# Patient Record
Sex: MALE | Employment: FULL TIME | URBAN - METROPOLITAN AREA
[De-identification: names, ages, dates, MRNs, and addresses within clinical notes are randomized per-mention and may not be internally consistent; named-entity substitution may affect disease eponyms.]

---

## 2023-10-04 ENCOUNTER — PRE VISIT (OUTPATIENT)
Dept: ONCOLOGY | Facility: CLINIC | Age: 28
End: 2023-10-04
Payer: COMMERCIAL

## 2023-10-04 ENCOUNTER — TRANSCRIBE ORDERS (OUTPATIENT)
Dept: ONCOLOGY | Facility: CLINIC | Age: 28
End: 2023-10-04
Payer: COMMERCIAL

## 2023-10-04 ENCOUNTER — PATIENT OUTREACH (OUTPATIENT)
Dept: ONCOLOGY | Facility: CLINIC | Age: 28
End: 2023-10-04
Payer: COMMERCIAL

## 2023-10-04 DIAGNOSIS — R91.8 PULMONARY NODULES: Primary | ICD-10-CM

## 2023-10-04 NOTE — TELEPHONE ENCOUNTER
Action    Action Taken 10/4/2023 2:35pm KEB   Warm Transfer From Scheduling     I spoke to pt Delroy and his mom, Deb.     He was seen at [facility]     Deb would like to have the nurses call her Cell at Ph: [phone]- I sent a message to [facility] asking them to make this phone number the primary contact # in his chart.     I called [facility]'s IMG Dept Ph: [phone]- I called the [place] [facility] - I was transferred again Ext [phone]- I left a detailed  requesting scans.     3:53pm KE   I called [facility]'s IMG Dept in [place] again - their phone went to .      RECORDS STATUS - ALL OTHER DIAGNOSIS      RECORDS RECEIVED FROM: [facility]    Appt Date: Warm Transfer From Scheduling    NOTES STATUS DETAILS   OFFICE NOTE from referring provider     OFFICE NOTE from medical oncologist     DISCHARGE SUMMARY from hospital     DISCHARGE REPORT from the ER     OPERATIVE REPORT     CLINICAL TRIAL TREATMENTS TO DATE     LABS     PATHOLOGY REPORTS     ANYTHING RELATED TO DIAGNOSIS Complete  CE- Labs last updated on 9/29/2023   GENONOMIC TESTING     TYPE:     IMAGING (NEED IMAGES & REPORT)     [facility]- [facility] Ph: [phone]   CT Chest 10/2/2023     CT abdomen Pelvis 9/29/2023    CT abdomen Pelvis 9/29/2023   MRI     MAMMO     ULTRASOUND Requested- [facility]  US Scrotum 9/29/2023    PET

## 2023-10-04 NOTE — PROGRESS NOTES
I received a new referral for patient to be seen in the IP (Interventional Pulmonology) clinic, but after review of his chart I am checking with our malignant heme nurse navigators to see if they feel he would benefit from one of their physicians and/or both (MH & IP).    I called and left a message for his mother to call me regarding the referral to hopefully get a bit more background.  I asked that she call me back and I gave her my direct contact information.        New IP (Interventional Pulmonology) referral rec'd.  Chart reviewed.      New Patient: Interventional Pulmonary (Lung nodule) Nurse Navigator Note    Referring provider: self    Referred to (specialty): Interventional Pulmonary (Lung nodule)    Requested provider (if applicable): n/a    Date Referral Received: 10/4/2023    Evaluation for: Pt called/4 lung nodules  Pt and Mother called - 2nd opinion  Unsure of Dx but states that Pt had 4 nodules. Two largest size is 8mm and 10mm. Lymph node in Pelvic is inflammed. Pt is struggling with pain.   All records are in [facility]. Pt was in urgent care on 9/23 and followed up with a NP in primary care.   Ct scan of chest completed on 10/2  CT of Abdomen completed 9/29  Ultrasound to check for testicular cancer  Biopsy for Abdomen and Pelvis is scheduled for 10/11  Biopsy of Lungs is scheduled in November.    Clinical History (per Nurse review of records provided):    **BOOK MARKED**    [facility]--Requested to be pushed to PACS  10/2/2023:  CT Chest W/Contrast    LOCATION: [facility]   DATE: 10/2/2023     INDICATION: Lung nodule, > 8mm. Lung mass. New lymphadenopathy   involving the abdomen/pelvis. Evaluate for lymphadenopathy or   metastatic disease.   COMPARISON: 09/29/2023, 10/15/2018.    TECHNIQUE: CT chest with IV contrast. Multiplanar reformats were   obtained. Dose reduction techniques were used.     CONTRAST: 75 mL Omnipaque 350.     FINDINGS:   LUNGS AND PLEURA:  Multiple indeterminate well-circumscribed solid   pulmonary nodules in the right lung. 3 mm nodule anterior aspect   right upper lobe. 8 mm well-circumscribed solid noncalcified   pulmonary nodule right middle lobe immediately adjacent to the hilar   region. 6 mm nodule posterior lateral right lower lobe. 10 mm mean   diameter pulmonary nodule right lower lobe. No infiltrates or   effusions.     MEDIASTINUM/AXILLAE: No lymphadenopathy. No thoracic aneurysm.     CORONARY ARTERY CALCIFICATION: None.     UPPER ABDOMEN: Unchanged upper abdominal lymphadenopathy as detailed   on this and CT abdomen pelvis 09/29/2023.     MUSCULOSKELETAL: Unremarkable.     IMPRESSION:   1.  Multiple indeterminate well-circumscribed solid pulmonary nodules   in the right lung as detailed above. The largest measures 10 mm at   the right lung base and another measures 8 mm in the medial right   middle lobe. These are indeterminate and may represent metastatic   disease given the lymphadenopathy in the abdomen and pelvis.     2.  No evidence for lymphadenopathy involving the mediastinum or   either axillary region.        9/29/2023:  CT ABDOMEN PELVIS w CONTRAST   LOCATION: [facility]     INDICATION: Left lower quadrant abdominal pain. Suprapubic and left   lower quadrant abdominal pain.  Previous CT scan in 2018 with   testicle in inguinal canal. Evaluate for diverticulitis and   prostatitis.     COMPARISON: 10/15/2018 CT.     TECHNIQUE: CT scan of the abdomen and pelvis was performed following   injection of IV contrast. Multiplanar reformats were obtained. Dose   reduction techniques were used.     CONTRAST: 100 mL Omnipaque 350 IV.     FINDINGS:   LOWER CHEST: New 10 mm noncalcified right lower lobe nodule. No   effusion.     HEPATOBILIARY: Normal.     PANCREAS: Normal.     SPLEEN: Normal.     ADRENAL GLANDS: Normal.     KIDNEYS/BLADDER: Normal.     BOWEL: No obstruction or inflammatory change. Normal appendix.     LYMPH  NODES: Diffuse mild/moderate abdominal/pelvic retroperitoneal   lymphadenopathy. Examples of largest nodes include 3.0 x 1.8 cm   retrocrural, 3.0 x 1.8 cm left periaortic, 2.5 x 2.3 cm left   periaortic, 4.2 x 2.2 cm left external iliac, 2.0 x 1.4 cm right   internal iliac, 2.7 x 2.2 cm left internal iliac, and 2.8 x 1.7 cm   left inguinal. No right inguinal lymphadenopathy. Multiple small,   mildly enlarged mesenteric root lymph nodes. Mild haziness mesenteric   root and retroperitoneal fat.     VASCULATURE: Unremarkable.     PELVIC ORGANS: Right testicle now appears appropriately located in   the scrotum, testicles and scrotum incompletely imaged on this exam.   Left testicle is not included on imaging field of view.     MUSCULOSKELETAL: Unremarkable. No suspicious bone lesion is   visualized.     IMPRESSION:   1. Diffuse mild/moderately enlarged lymph nodes throughout   abdominal/pelvic retroperitoneum, mesentery, and left groin.   2.  New indeterminate 1 cm right lung base nodule.   3.  Findings suggest malignancy, consider lymphoma. Testicles/scrotum   not included on imaging field of view, correlate with physical exam   or ultrasound to exclude testicular mass/neoplasm. Consider chest CT   correlation to complete staging. Lymph nodes would be amenable to   CT-guided biopsy.   4.  No bowel inflammation or other acute findings. Solid abdominal   organs unremarkable.   Records Location (Care Everywhere, Media, etc.): [facility]    RECORDS NEEDED:  Last FIVE years **ALL** imaging pushed to PACS from [place] [employer]----thank you!!    Additional testing needed prior to consult: TBD

## 2023-10-05 NOTE — PROGRESS NOTES
I called Deb, pt's mom back and went over what my co-worker suggested.  So he will get his biopsy with [facility] on WED 10/11, then see one of our community doctors a week later to review results and go over next steps. She thought this was a good plan.  He will be scheduled at the  location as this is closest to their home.

## 2023-10-05 NOTE — PROGRESS NOTES
I received a call back from mom, Deb this morning.  I asked for more clarification on what they would like regarding his referral.  I explained he has two different things going on that may or may not be related.  I am more concerned at this point regarding his lymphadenopathy and figuring out what that is.  I can always have him seen with the lung nodule clinic, but seeing someone in oncology fist might be beneficial.  She did confirm that pt has a biopsy scheduled next WED 10/11 (earliest they could get him in).  I told Deb I would like to check in with our malignant heme navigator to get her input.  I told Deb after some more review I will get back to her with a plan.

## 2023-10-10 NOTE — TELEPHONE ENCOUNTER
Imaging Received  October 10, 2023    4:21 PM  FILOMENA   Action: Images from [facility] received and resolved to PACS.

## 2023-10-17 NOTE — PROGRESS NOTES
[facility]    Hematology/Oncology New Patient Note      Today's Date: 10/18/23    Reason for Consult: Embryonal carcinoma.    HISTORY OF PRESENT ILLNESS: Jayson Banegas is a 28 year old male with history of alcoholism and nicotine dependence in remission, depression, anxiety, and PTSD who presents with the following oncologic history:  1. 9/2023: Presented to PCP with left lower quadrant abdominal pain.  Given 2 weeks of antibiotics with no improvement.  2. 9/29/2023: CT abdomen/pelvis with contrast at [facility] showed new 10 mm noncalcified left lower lobe lung nodule; no effusion; diffuse mild/moderate abdominal/pelvic retroperitoneal lymphadenopathy, largest nodes 3.0 x 1.8 cm   retrocrural, 3.0 x 1.8 cm left periaortic, 2.5 x 2.3 cm left   periaortic, 4.2 x 2.2 cm left external iliac, 2.0 x 1.4 cm right internal iliac, 2.7 x 2.2 cm left internal iliac, and 2.8 x 1.7 cm  left inguinal. No right inguinal lymphadenopathy. Multiple small, mildly enlarged mesenteric root lymph nodes. Mild haziness mesenteric root and retroperitoneal fat. Right testicle now located in scrotum; left testicle not included in field of view; no bone lesions.  3. 9/29/2023: U/S of scrotum with Doppler showed no evidence for intratesticular mass; small benign right epididymal head cyst; small left varicocele. CBC normal except monocytes slightly elevated at 1.1; normal BMP; CRP slightly elevated at 1.04.  4. 10/02/2023: CT chest with contrast showed multiple indeterminate well-circumscribed solid pulmonary nodules in the right lung; 3 mm nodule anterior right upper lobe; 8 mm solid pulmonary nodule in right middle lobe; 6 mm nodule in lateral right lower lobe; additional 10 mm right lower lobe lung nodule; no infiltrates or effusions; unchanged upper abdominal lymphadenopathy.  5. 10/11/2023: CT-guided biopsy of retroperitoneal lymph node at [facility] showed embryonal carcinoma, cannot rule out  mixed type. Biopsy of 6 mm of right lung biopsy was negative for malignancy. LDH elevated at 1643. Beta-HCG = 856, AFP = 34. Biopsy specimen sent to [facility] for review of pathology.    Jayson reports constipation, occasional fevers or chills, no dysuria or hematuria.  He reports stable weight. He does note new bilateral nipple pain and increased sensitivity. He also reports lower quadrant abdominal pain improved with oxycodone.    REVIEW OF SYSTEMS:   14 point ROS was reviewed and is negative other than as noted above in HPI.       HOME MEDICATIONS:  Current Outpatient Medications   Medication Sig Dispense Refill    acetaminophen (TYLENOL) 325 MG tablet Take 325-650 mg by mouth      oxyCODONE (ROXICODONE) 5 MG tablet TAKE ONE TABLET BY MOUTH EVERY 4 HOURS AS NEEDED FOR SEVERE PAIN - MAX DAILY AMOUNT:30MG *CAUTION: OPIOID. RISK OF OVERDOSE AND ADDICTION.           ALLERGIES:  Allergies   Allergen Reactions    Sulfamethoxazole-Trimethoprim Itching         PAST MEDICAL HISTORY:  None.      PAST SURGICAL HISTORY:  Manquin tooth removal.    SOCIAL HISTORY:  Social History     Socioeconomic History    Marital status: Single     Spouse name: Not on file    Number of children: Not on file    Years of education: Not on file    Highest education level: Not on file   Occupational History    Not on file   Tobacco Use    Smoking status: Not on file    Smokeless tobacco: Not on file   Substance and Sexual Activity    Alcohol use: Not on file    Drug use: Not on file    Sexual activity: Not on file   Other Topics Concern    Not on file   Social History Narrative    Not on file     Social Determinants of Health     Financial Resource Strain: Not on file   Food Insecurity: Not on file   Transportation Needs: Not on file   Physical Activity: Not on file   Stress: Not on file   Social Connections: Not on file   Interpersonal Safety: Not on file   Housing Stability: Not on file         FAMILY HISTORY:  BRCA-2 mutation on paternal  "side of family. Paternal great aunt with unknown type of cancer. Maternal grandparent with pancreatic cancer.    PHYSICAL EXAM:  Vital signs:  /80   Pulse 80   Resp 16   Ht 1.753 m (5' 9\")   Wt 94.2 kg (207 lb 9.6 oz)   SpO2 99%   BMI 30.66 kg/m     ECO  GENERAL/CONSTITUTIONAL: No acute distress.  EYES:  No scleral icterus.  ENT/MOUTH: Neck supple. Oropharynx grossly clear.  LYMPH: No cervical, supraclavicular, axillary, or inguinal adenopathy.  BREAST: Mild gynecomastia present without discrete masses; nipples everted with no discharge bilaterally.   RESPIRATORY: Clear to auscultation bilaterally. No crackles or wheezing.   CARDIOVASCULAR: Regular rate and rhythm without murmurs.  GASTROINTESTINAL: No hepatosplenomegaly, masses, or tenderness.  No guarding.  No distention.  MUSCULOSKELETAL: Warm and well-perfused, no cyanosis, clubbing, or edema.  NEUROLOGIC: No focal motor deficits. Alert, oriented, answers questions appropriately.  INTEGUMENTARY: No rashes or jaundice.  GAIT: Steady, does not use assistive device      LABS:  As noted in HPI.    PATHOLOGY:  Reviewed as per HPI.    IMAGING:  Reviewed as per HPI.    ASSESSMENT/PLAN:  Jayson Banegas is a 28 year old male with the following issues:  1. 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 Multiple right lung nodules  3. Diffuse abdominal, pelvic, and retroperitoneal lymphadenopathy  --I reviewed multiple outside records from [facility] including ultrasound and CT scans, which demonstrate scattered small right lung nodules and diffuse abdominal, pelvic and retroperitoneal lymphadenopathy.  --10/11/2023 CT-guided biopsy of lung nodule negative for malignancy. CT-guided biopsy of retroperitoneal lymph node confirmed an embryonal carcinoma, which is nonseminoma type but cannot yet rule out if mixed types are present.  Biopsy sent to [facility], with pathology 2nd review pending.  --Discussed that the biomarkers show elevated beta-HCG, " LDH, and slightly elevated AFP.  The elevated AFP is indicative of nonseminoma as well.  Appears to have stage III disease.  --Although PET scan is not necessarily indicated for nonseminoma, I advised having this scan arranged locally in [place] to further evaluate the lung nodules.  --Discussed that he has a testicular cancer for which chemotherapy will be needed.   --I also advised he see urology regarding orchiectomy.  --Discussed with Delroy that I recommend sperm banking prior to chemotherapy.  I have referred him to reproductive endocrinology.  --I advised he make an appointment to see an oncologist locally to arrange chemotherapy. However, I was later informed that local oncologist declined to treat him at [facility] in [place]. Therefore will arrange for urology consult here, imaging, and port placement.  --Will have him revisit with me for video visit to regroup about his staging, pathology 2nd opinion, and recommended chemotherapy.  Will likely need BEP 3 or 4 cycles depending on risk stratification.    Heather Wick MD  Hematology/Oncology  [facility]    Total time spent today: 91 minutes in chart review, patient evaluation, counseling, documentation, test and/or medication/prescription orders, and coordination of care.

## 2023-10-18 ENCOUNTER — ONCOLOGY VISIT (OUTPATIENT)
Dept: ONCOLOGY | Facility: CLINIC | Age: 28
End: 2023-10-18
Attending: INTERNAL MEDICINE
Payer: COMMERCIAL

## 2023-10-18 VITALS
WEIGHT: 207.6 LBS | OXYGEN SATURATION: 99 % | HEIGHT: 69 IN | BODY MASS INDEX: 30.75 KG/M2 | DIASTOLIC BLOOD PRESSURE: 80 MMHG | RESPIRATION RATE: 16 BRPM | HEART RATE: 80 BPM | SYSTOLIC BLOOD PRESSURE: 115 MMHG

## 2023-10-18 DIAGNOSIS — C80.1 EMBRYONAL CARCINOMA (H): Primary | ICD-10-CM

## 2023-10-18 DIAGNOSIS — R59.1 LYMPHADENOPATHY: ICD-10-CM

## 2023-10-18 DIAGNOSIS — R91.8 PULMONARY NODULES: ICD-10-CM

## 2023-10-18 PROCEDURE — 99213 OFFICE O/P EST LOW 20 MIN: CPT | Performed by: INTERNAL MEDICINE

## 2023-10-18 PROCEDURE — 99417 PROLNG OP E/M EACH 15 MIN: CPT | Performed by: INTERNAL MEDICINE

## 2023-10-18 PROCEDURE — 99205 OFFICE O/P NEW HI 60 MIN: CPT | Performed by: INTERNAL MEDICINE

## 2023-10-18 RX ORDER — ACETAMINOPHEN 325 MG/1
325-650 TABLET ORAL EVERY 6 HOURS PRN
COMMUNITY
End: 2024-05-01

## 2023-10-18 RX ORDER — OXYCODONE HYDROCHLORIDE 5 MG/1
TABLET ORAL
COMMUNITY
End: 2023-10-25

## 2023-10-18 ASSESSMENT — PAIN SCALES - GENERAL: PAINLEVEL: MODERATE PAIN (5)

## 2023-10-18 NOTE — PROGRESS NOTES
"Oncology Rooming Note    October 18, 2023 11:04 AM   Jayson Banegas is a 28 year old male who presents for:    Chief Complaint   Patient presents with    Oncology Clinic Visit     Initial Vitals: Resp 16   Ht 1.753 m (5' 9\")   Wt 94.2 kg (207 lb 9.6 oz)   BMI 30.66 kg/m   Estimated body mass index is 30.66 kg/m  as calculated from the following:    Height as of this encounter: 1.753 m (5' 9\").    Weight as of this encounter: 94.2 kg (207 lb 9.6 oz). Body surface area is 2.14 meters squared.  Moderate Pain (5) Comment: Data Unavailable   No LMP for male patient.  Allergies reviewed: Yes  Medications reviewed: Yes    Medications: Medication refills not needed today.  Pharmacy name entered into [facility]: [id number] - [place], [place] - [address]        Corrina Horowitz MA            "

## 2023-10-18 NOTE — LETTER
10/18/2023         RE: Jayson Banegas  [address]  [address]  [facility] [address]        Dear Colleague,    Thank you for referring your patient, Jayson Banegas, to the [facility]. Please see a copy of my visit note below.    [facility]    Hematology/Oncology New Patient Note      Today's Date: 10/18/23    Reason for Consult: Embryonal carcinoma.    HISTORY OF PRESENT ILLNESS: Jayson Banegas is a 28 year old male with history of alcoholism and nicotine dependence in remission, depression, anxiety, and PTSD who presents with the following oncologic history:  1. 9/2023: Presented to PCP with left lower quadrant abdominal pain.  Given 2 weeks of antibiotics with no improvement.  2. 9/29/2023: CT abdomen/pelvis with contrast at [facility] showed new 10 mm noncalcified left lower lobe lung nodule; no effusion; diffuse mild/moderate abdominal/pelvic retroperitoneal lymphadenopathy, largest nodes 3.0 x 1.8 cm   retrocrural, 3.0 x 1.8 cm left periaortic, 2.5 x 2.3 cm left   periaortic, 4.2 x 2.2 cm left external iliac, 2.0 x 1.4 cm right internal iliac, 2.7 x 2.2 cm left internal iliac, and 2.8 x 1.7 cm  left inguinal. No right inguinal lymphadenopathy. Multiple small, mildly enlarged mesenteric root lymph nodes. Mild haziness mesenteric root and retroperitoneal fat. Right testicle now located in scrotum; left testicle not included in field of view; no bone lesions.  3. 9/29/2023: U/S of scrotum with Doppler showed no evidence for intratesticular mass; small benign right epididymal head cyst; small left varicocele. CBC normal except monocytes slightly elevated at 1.1; normal BMP; CRP slightly elevated at 1.04.  4. 10/02/2023: CT chest with contrast showed multiple indeterminate well-circumscribed solid pulmonary nodules in the right lung; 3 mm nodule anterior right upper lobe; 8 mm solid pulmonary nodule in right middle lobe; 6 mm  nodule in lateral right lower lobe; additional 10 mm right lower lobe lung nodule; no infiltrates or effusions; unchanged upper abdominal lymphadenopathy.  5. 10/11/2023: CT-guided biopsy of retroperitoneal lymph node at [facility] showed embryonal carcinoma, cannot rule out mixed type. Biopsy of 6 mm of right lung biopsy was negative for malignancy. LDH elevated at 1643. Beta-HCG = 856, AFP = 34. Biopsy specimen sent to [facility] for review of pathology.    Jayson reports constipation, occasional fevers or chills, no dysuria or hematuria.  He reports stable weight. He does note new bilateral nipple pain and increased sensitivity. He also reports lower quadrant abdominal pain improved with oxycodone.    REVIEW OF SYSTEMS:   14 point ROS was reviewed and is negative other than as noted above in HPI.       HOME MEDICATIONS:  Current Outpatient Medications   Medication Sig Dispense Refill     acetaminophen (TYLENOL) 325 MG tablet Take 325-650 mg by mouth       oxyCODONE (ROXICODONE) 5 MG tablet TAKE ONE TABLET BY MOUTH EVERY 4 HOURS AS NEEDED FOR SEVERE PAIN - MAX DAILY AMOUNT:30MG *CAUTION: OPIOID. RISK OF OVERDOSE AND ADDICTION.           ALLERGIES:  Allergies   Allergen Reactions     Sulfamethoxazole-Trimethoprim Itching         PAST MEDICAL HISTORY:  None.      PAST SURGICAL HISTORY:  [place] tooth removal.    SOCIAL HISTORY:  Social History     Socioeconomic History     Marital status: Single     Spouse name: Not on file     Number of children: Not on file     Years of education: Not on file     Highest education level: Not on file   Occupational History     Not on file   Tobacco Use     Smoking status: Not on file     Smokeless tobacco: Not on file   Substance and Sexual Activity     Alcohol use: Not on file     Drug use: Not on file     Sexual activity: Not on file   Other Topics Concern     Not on file   Social History Narrative     Not on file     Social Determinants of Health     Financial Resource Strain:  "Not on file   Food Insecurity: Not on file   Transportation Needs: Not on file   Physical Activity: Not on file   Stress: Not on file   Social Connections: Not on file   Interpersonal Safety: Not on file   Housing Stability: Not on file         FAMILY HISTORY:  BRCA-2 mutation on paternal side of family. Paternal great aunt with unknown type of cancer. Maternal grandparent with pancreatic cancer.    PHYSICAL EXAM:  Vital signs:  /80   Pulse 80   Resp 16   Ht 1.753 m (5' 9\")   Wt 94.2 kg (207 lb 9.6 oz)   SpO2 99%   BMI 30.66 kg/m     ECO  GENERAL/CONSTITUTIONAL: No acute distress.  EYES:  No scleral icterus.  ENT/MOUTH: Neck supple. Oropharynx grossly clear.  LYMPH: No cervical, supraclavicular, axillary, or inguinal adenopathy.  BREAST: Mild gynecomastia present without discrete masses; nipples everted with no discharge bilaterally.   RESPIRATORY: Clear to auscultation bilaterally. No crackles or wheezing.   CARDIOVASCULAR: Regular rate and rhythm without murmurs.  GASTROINTESTINAL: No hepatosplenomegaly, masses, or tenderness.  No guarding.  No distention.  MUSCULOSKELETAL: Warm and well-perfused, no cyanosis, clubbing, or edema.  NEUROLOGIC: No focal motor deficits. Alert, oriented, answers questions appropriately.  INTEGUMENTARY: No rashes or jaundice.  GAIT: Steady, does not use assistive device      LABS:  As noted in HPI.    PATHOLOGY:  Reviewed as per HPI.    IMAGING:  Reviewed as per HPI.    ASSESSMENT/PLAN:  Jayson Banegas is a 28 year old male with the following issues:  1. 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 Multiple right lung nodules  3. Diffuse abdominal, pelvic, and retroperitoneal lymphadenopathy  --I reviewed multiple outside records from [facility] including ultrasound and CT scans, which demonstrate scattered small right lung nodules and diffuse abdominal, pelvic and retroperitoneal lymphadenopathy.  --10/11/2023 CT-guided biopsy of lung nodule negative for " "malignancy. CT-guided biopsy of retroperitoneal lymph node confirmed an embryonal carcinoma, which is nonseminoma type but cannot yet rule out if mixed types are present.  Biopsy sent to [facility], with pathology 2nd review pending.  --Discussed that the biomarkers show elevated beta-HCG, LDH, and slightly elevated AFP.  The elevated AFP is indicative of nonseminoma as well.  Appears to have stage III disease.  --Although PET scan is not necessarily indicated for nonseminoma, I advised having this scan arranged locally in [place] to further evaluate the lung nodules.  --Discussed that he has a testicular cancer for which chemotherapy will be needed.   --I also advised he see urology regarding orchiectomy.  --Discussed with Delroy that I recommend sperm banking prior to chemotherapy.  I have referred him to reproductive endocrinology.  --I advised he make an appointment to see an oncologist locally to arrange chemotherapy. However, I was later informed that local oncologist declined to treat him at [facility] in [place]. Therefore will arrange for urology consult here, imaging, and port placement.  --Will have him revisit with me for video visit to regroup about his staging, pathology 2nd opinion, and recommended chemotherapy.  Will likely need BEP 3 or 4 cycles depending on risk stratification.    Heather Wick MD  Hematology/Oncology  [facility]    Total time spent today: 91 minutes in chart review, patient evaluation, counseling, documentation, test and/or medication/prescription orders, and coordination of care.      Oncology Rooming Note    October 18, 2023 11:04 AM   Jayson Banegas is a 28 year old male who presents for:    Chief Complaint   Patient presents with     Oncology Clinic Visit     Initial Vitals: Resp 16   Ht 1.753 m (5' 9\")   Wt 94.2 kg (207 lb 9.6 oz)   BMI 30.66 kg/m   Estimated body mass index is 30.66 kg/m  as calculated from the following:    Height as of " "this encounter: 1.753 m (5' 9\").    Weight as of this encounter: 94.2 kg (207 lb 9.6 oz). Body surface area is 2.14 meters squared.  Moderate Pain (5) Comment: Data Unavailable   No LMP for male patient.  Allergies reviewed: Yes  Medications reviewed: Yes    Medications: Medication refills not needed today.  Pharmacy name entered into [facility]: [id number] - [place], [place] - [address]        Corrina Horowitz MA              Again, thank you for allowing me to participate in the care of your patient.        Sincerely,        Heather Wick MD  "

## 2023-10-19 ENCOUNTER — TELEPHONE (OUTPATIENT)
Dept: ONCOLOGY | Facility: CLINIC | Age: 28
End: 2023-10-19
Payer: COMMERCIAL

## 2023-10-19 ENCOUNTER — TRANSCRIBE ORDERS (OUTPATIENT)
Dept: OTHER | Age: 28
End: 2023-10-19

## 2023-10-19 DIAGNOSIS — C80.1 EMBRYONAL CARCINOMA (H): Primary | ICD-10-CM

## 2023-10-19 DIAGNOSIS — R91.8 PULMONARY NODULES: ICD-10-CM

## 2023-10-19 DIAGNOSIS — C80.1 GERM CELL CANCER (H): Primary | ICD-10-CM

## 2023-10-19 DIAGNOSIS — R59.1 LYMPHADENOPATHY: ICD-10-CM

## 2023-10-19 NOTE — TELEPHONE ENCOUNTER
Spoke with Saira in pathology at [facility]. Was transferred to Dr Pacheco who agrees to send [facility] [id number]. Sent back to Saira who states she will process this request today and fax us with results (should be in [place] as well)    Tierney Santo RN

## 2023-10-19 NOTE — TELEPHONE ENCOUNTER
"Spoke with Deb and Delroy to discuss plan per Dr Wick.    \"Thank you for clarifying with them!  Since they prefer to come to [place], let's get Delroy setup to see Dr. Gilmore (urology) but I am not sure how many days he comes to [place] as he sees patients at [facility] as well.      For the PET, per NCCN it is not \"clinically indicated for testicular cancer\" but if it could be approved, I would like to know if the lung nodules are malignant (other than the one that was biopsied and benign).  But the PET may still not be definitive due to small size of the nodules.  I will try to put the order in as emergent but I did caution that it may be delayed due to prior auth requirement or denied.     Could you please find out how we can get access to the [place] pathology re-read?  Not sure if the slides from [facility] are in-transit.     I will place orders for urgent urology consult, PET/CT, and IR port placement.     Please also add-on return video visit with me for 10/31 at 12:40pm. I will discuss chemo with him (he's going to need BEP x 3-4 cycles unless the [place] path read is somehow entirely different) \"    They will be contacted by Tia in scheduling. I will find out the status of the [facility] reread of pathology    Tierney Santo RN      "

## 2023-10-20 ENCOUNTER — TELEPHONE (OUTPATIENT)
Dept: ONCOLOGY | Facility: CLINIC | Age: 28
End: 2023-10-20
Payer: COMMERCIAL

## 2023-10-20 NOTE — TELEPHONE ENCOUNTER
Action 2023 JTV 3:04 PM    Action Taken CSS called [facility] and confirmed with Erika that images will be pushed to [facility].    MEDICAL RECORDS REQUEST   [facility]  Urology Clinic  [address] [place] [id number]  PHONE: [phone]  Fax: [phone]        FUTURE VISIT INFORMATION                                                   Jayson Hoffman Bassam, : 1995 scheduled for future visit at [facility]    APPOINTMENT INFORMATION:  Date: 10/24/2023  Provider:  Boogie Gilmore MD  Reason for Visit/Diagnosis: Testicular Cancer    REFERRAL INFORMATION:  Referring provider:  Heather Wick MD in  CANCER CARE      RECORDS REQUESTED FOR VISIT                                                     NOTES  STATUS/DETAILS   OFFICE NOTE from referring provider  yes, Heather Wick MD in  CANCER CARE   OFFICE NOTE from other specialist  yes   OPERATIVE REPORT  yes   MEDICATION LIST  yes   LABS     URINALYSIS (UA)  yes   TESTICULAR CANCER     IMAGES  yes, [facility]  2023 -- US SCROTUM  2023 -- CT ABD PELVIS   TUMOR MARKERS (LDH, HCG, AFP)  yes     PRE-VISIT CHECKLIST      Joint diagnostic appointment coordinated correctly          (ensure right order & amount of time) Yes   RECORD COLLECTION COMPLETE yes

## 2023-10-23 ENCOUNTER — MYC MEDICAL ADVICE (OUTPATIENT)
Dept: INTERVENTIONAL RADIOLOGY/VASCULAR | Facility: CLINIC | Age: 28
End: 2023-10-23
Payer: COMMERCIAL

## 2023-10-23 ENCOUNTER — TELEPHONE (OUTPATIENT)
Dept: ONCOLOGY | Facility: CLINIC | Age: 28
End: 2023-10-23
Payer: COMMERCIAL

## 2023-10-23 NOTE — CONFIDENTIAL NOTE
"Deb calling to report that pt has been having chest pain for the past 2 days, and that he describes it as \"tight\". She notes that he told her that he is thinking of going to the ED, which is abnormal for him as he is someone with a high pain tolerance.    Writer advised that pt be seen in the ED immediately and that they should call 911 if they do no have access to transportation right now. Pt is not experiencing shortness of breath, loss of consciousness, or any acute symptoms that started today. Writer advised that if symptoms worsen or new symptoms occur that they should call 911. She verbalized understanding.  "

## 2023-10-24 ENCOUNTER — VIRTUAL VISIT (OUTPATIENT)
Dept: UROLOGY | Facility: CLINIC | Age: 28
End: 2023-10-24
Payer: COMMERCIAL

## 2023-10-24 ENCOUNTER — TELEPHONE (OUTPATIENT)
Dept: UROLOGY | Facility: CLINIC | Age: 28
End: 2023-10-24

## 2023-10-24 ENCOUNTER — PRE VISIT (OUTPATIENT)
Dept: UROLOGY | Facility: CLINIC | Age: 28
End: 2023-10-24

## 2023-10-24 VITALS — WEIGHT: 204 LBS | BODY MASS INDEX: 30.13 KG/M2

## 2023-10-24 DIAGNOSIS — C80.1 EMBRYONAL CARCINOMA (H): Primary | ICD-10-CM

## 2023-10-24 DIAGNOSIS — C80.1 EMBRYONAL CARCINOMA (H): ICD-10-CM

## 2023-10-24 PROCEDURE — 99204 OFFICE O/P NEW MOD 45 MIN: CPT | Mod: VID | Performed by: UROLOGY

## 2023-10-24 RX ORDER — HYDROMORPHONE HYDROCHLORIDE 2 MG/1
2 TABLET ORAL
COMMUNITY
Start: 2023-10-23 | End: 2023-10-25

## 2023-10-24 ASSESSMENT — PAIN SCALES - GENERAL: PAINLEVEL: MODERATE PAIN (5)

## 2023-10-24 NOTE — PROGRESS NOTES
Jayson Banegas is a 28 year old male who is being evaluated via a billable video visit.      How would you like to obtain your AVS? [employer]  If the video visit is dropped, the invitation should be resent by: Text to cell phone: [phone]  Will anyone else be joining your video visit? No    Video Start Time: 4:48 PM        Chief Complaint:   Testis Cancer         Consult or Referral:     Mr. Jayson Banegas is a 28 year old male evaluated at the request of Dr. Wick.         History of Present Illness:     Jayson Banegas is a very pleasant 28 year old male who presents with a history of germ cell tumor. He initially presented with abdominal pain in September 2023. CT 9/29/2023 demonstrated retroperitoneal lymphadenopathy with enlarged inguinal and iliac nodes as well. Subsequent scrotal ultrasound did not demonstrate any suspicious lesions within the testes. Chest CT demonstrated multiple pulmonary nodules suspicious for malignancy. A CT-guided biopsy of this demonstrated embryonal carcinoma. He has seen medical oncology about plans to start chemotherapy. He plans to given semen sample later this week for sperm preservation. He continues to have abdominal pain that is bothersome. He is referred today for consultation about potential role of surgery in his multidisciplinary care.    Tumor Markers 10/16/2023  LDH - 1643  HCG - 856  AFP  - 34    Biopsy 10/11/2023  Final Diagnosis    A. Lung, 6 mm right lung, core needle biopsies:   --- BENIGN:  Benign lung parenchyma with nonspecific chronic inflammation.   --- No evidence of primary or metastatic malignancy.  --- No definitive features to explain a mass lesion.     B. Lymph node, retroperitoneal, core needle biopsies:   --- POSITIVE FOR MALIGNANCY: Embryonal carcinoma (see comment).             Past Medical History:   PTSD  Depression  History of smoking and EtOH use         Past Surgical History:   None         Medications     Current  Outpatient Medications   Medication    acetaminophen (TYLENOL) 325 MG tablet    HYDROmorphone (DILAUDID) 2 MG tablet    oxyCODONE (ROXICODONE) 5 MG tablet     No current facility-administered medications for this visit.          Family History:   BRCA-2 mutation on paternal side of family. Paternal great aunt with unknown type of cancer. Maternal grandparent with pancreatic cancer.          Social History:     Social History     Socioeconomic History    Marital status: Single     Spouse name: Not on file    Number of children: Not on file    Years of education: Not on file    Highest education level: Not on file   Occupational History    Not on file   Tobacco Use    Smoking status: Former     Types: Cigarettes     Passive exposure: Never    Smokeless tobacco: Never   Substance and Sexual Activity    Alcohol use: Not on file    Drug use: Not on file    Sexual activity: Not on file   Other Topics Concern    Not on file   Social History Narrative    Not on file     Social Determinants of Health     Financial Resource Strain: Not on file   Food Insecurity: Not on file   Transportation Needs: Not on file   Physical Activity: Not on file   Stress: Not on file   Social Connections: Not on file   Interpersonal Safety: Not on file   Housing Stability: Not on file          Allergies:   Sulfamethoxazole-trimethoprim         Review of Systems:      Constitutional, HEENT, cardiovascular, pulmonary, gi and gu systems are negative, except as otherwise noted.         Physical Exam:     Vitals:  No vitals were obtained today due to virtual visit.    Physical Exam   GENERAL: Healthy, alert and no distress  EYES: Eyes grossly normal to inspection.  No discharge or erythema, or obvious scleral/conjunctival abnormalities.  RESP: No audible wheeze, cough, or visible cyanosis.  No visible retractions or increased work of breathing.    SKIN: Visible skin clear. No significant rash, abnormal pigmentation or lesions.  NEURO: Cranial nerves  grossly intact.  Mentation and speech appropriate for age.  PSYCH: Mentation appears normal, affect normal/bright, judgement and insight intact, normal speech and appearance well-groomed.      Labs and Pathology:    I reviewed all applicable laboratory and pathology data and went over findings with patient    Pathology with embryonal carcinoma  Elevated tumor markers as in HPI  Creat 0.89 (GFR > 60)  PSA 0.21      Imaging:    I directly visualized and reviewed all applicable imaging a with patient.    CT abd/pelvis 9/29/2023  MPRESSION:   1.  Diffuse mild/moderately enlarged lymph nodes throughout   abdominal/pelvic retroperitoneum, mesentery, and left groin.   2.  New indeterminate 1 cm right lung base nodule.   3.  Findings suggest malignancy, consider lymphoma. Testicles/scrotum   not included on imaging field of view, correlate with physical exam   or ultrasound to exclude testicular mass/neoplasm. Consider chest CT   correlation to complete staging. Lymph nodes would be amenable to   CT-guided biopsy.   4.  No bowel inflammation or other acute findings. Solid abdominal   organs unremarkable.     CT Chest 10/2/2023  IMPRESSION:   1.  Multiple indeterminate well-circumscribed solid pulmonary nodules   in the right lung as detailed above. The largest measures 10 mm at   the right lung base and another measures 8 mm in the medial right   middle lobe. These are indeterminate and may represent metastatic   disease given the lymphadenopathy in the abdomen and pelvis.     2.  No evidence for lymphadenopathy involving the mediastinum or   either axillary region.       Outside and Past Medical records:    Review of prior external note(s) from - CareEverywhere information from [facility] reviewed  Review of the result(s) of each unique test - AFP, HCG, LDH, Creat, PSA, CT, pathology, ultrasound         Assessment and Plan:     Assessment: 28 year old male with metastatic embryonal carcinoma. We had a long discussion today  about the nature of germ cell tumors. While his clinical scenario is typical for testis cancer, he does not have any visible testis lesions at this time. We discussed that while he has metastatic germ cell tumor, this can be either from a burned out primary testis tumor that is no longer visible, or could be an extragonadal germ cell tumor. Regardless, given the extent of lymphadenopathy and apparent lung involvement, the primary modality of treatment is chemotherapy at this time. He is already working with medical oncology and has sperm preservation planned for later this week.    With regard to possible surgical involvement in his care, we discussed that the vast majority of germ cell tumors are treatable with chemotherapy and/or surgery. We discussed that given no identifiable testis lesion at this time, I would not recommend orchiectomy. With regard to his current disease burden, would be most prudent to start with chemotherapy and evaluated residual disease after this. Should he have complete response, he would likely be managed with ongoing surveillance. Should he have residual post-chemotherapy masses, however, we would likely recommend surgical  resection of any residual lymph nodes or masses. Delroy and his mother asked excellent questions today. I will plan to see him back after he completes chemotherapy to review his scans and further discuss possible RPLND at that time.     Plan:  Proceed with chemotherapy  Follow-up with me in 3-4 months to review scans and discuss RPLND      Video-Visit Details    Type of service:  Video Visit    Video End Time:5:03 PM    Originating Location (pt. Location): Home    Distant Location (provider location):  On-site    Platform used for Video Visit: Olinda Gilmore MD  Urology  [facility]

## 2023-10-24 NOTE — LETTER
10/24/2023       RE: Jayson Banegas  [address]  [address]  [facility] [address]     Dear Colleague,    Thank you for referring your patient, Jayson Banegas, to the [facility] [place] at [facility]. Please see a copy of my visit note below.    Jayson Banegas is a 28 year old male who is being evaluated via a billable video visit.      How would you like to obtain your AVS? MyChart  If the video visit is dropped, the invitation should be resent by: Text to cell phone: [phone]  Will anyone else be joining your video visit? No    Video Start Time: 4:48 PM        Chief Complaint:   Testis Cancer         Consult or Referral:     Mr. Jayson Banegas is a 28 year old male evaluated at the request of Dr. Wick.         History of Present Illness:     Jayson Banegas is a very pleasant 28 year old male who presents with a history of germ cell tumor. He initially presented with abdominal pain in September 2023. CT 9/29/2023 demonstrated retroperitoneal lymphadenopathy with enlarged inguinal and iliac nodes as well. Subsequent scrotal ultrasound did not demonstrate any suspicious lesions within the testes. Chest CT demonstrated multiple pulmonary nodules suspicious for malignancy. A CT-guided biopsy of this demonstrated embryonal carcinoma. He has seen medical oncology about plans to start chemotherapy. He plans to given semen sample later this week for sperm preservation. He continues to have abdominal pain that is bothersome. He is referred today for consultation about potential role of surgery in his multidisciplinary care.    Tumor Markers 10/16/2023  LDH - 1643  HCG - 856  AFP  - 34    Biopsy 10/11/2023  Final Diagnosis    A. Lung, 6 mm right lung, core needle biopsies:   --- BENIGN:  Benign lung parenchyma with nonspecific chronic inflammation.   --- No evidence of primary or metastatic malignancy.  --- No  definitive features to explain a mass lesion.     B. Lymph node, retroperitoneal, core needle biopsies:   --- POSITIVE FOR MALIGNANCY: Embryonal carcinoma (see comment).             Past Medical History:   PTSD  Depression  History of smoking and EtOH use         Past Surgical History:   None         Medications     Current Outpatient Medications   Medication    acetaminophen (TYLENOL) 325 MG tablet    HYDROmorphone (DILAUDID) 2 MG tablet    oxyCODONE (ROXICODONE) 5 MG tablet     No current facility-administered medications for this visit.          Family History:   BRCA-2 mutation on paternal side of family. Paternal great aunt with unknown type of cancer. Maternal grandparent with pancreatic cancer.          Social History:     Social History     Socioeconomic History    Marital status: Single     Spouse name: Not on file    Number of children: Not on file    Years of education: Not on file    Highest education level: Not on file   Occupational History    Not on file   Tobacco Use    Smoking status: Former     Types: Cigarettes     Passive exposure: Never    Smokeless tobacco: Never   Substance and Sexual Activity    Alcohol use: Not on file    Drug use: Not on file    Sexual activity: Not on file   Other Topics Concern    Not on file   Social History Narrative    Not on file     Social Determinants of Health     Financial Resource Strain: Not on file   Food Insecurity: Not on file   Transportation Needs: Not on file   Physical Activity: Not on file   Stress: Not on file   Social Connections: Not on file   Interpersonal Safety: Not on file   Housing Stability: Not on file          Allergies:   Sulfamethoxazole-trimethoprim         Review of Systems:      Constitutional, HEENT, cardiovascular, pulmonary, gi and gu systems are negative, except as otherwise noted.         Physical Exam:     Vitals:  No vitals were obtained today due to virtual visit.    Physical Exam   GENERAL: Healthy, alert and no distress  EYES:  Eyes grossly normal to inspection.  No discharge or erythema, or obvious scleral/conjunctival abnormalities.  RESP: No audible wheeze, cough, or visible cyanosis.  No visible retractions or increased work of breathing.    SKIN: Visible skin clear. No significant rash, abnormal pigmentation or lesions.  NEURO: Cranial nerves grossly intact.  Mentation and speech appropriate for age.  PSYCH: Mentation appears normal, affect normal/bright, judgement and insight intact, normal speech and appearance well-groomed.      Labs and Pathology:    I reviewed all applicable laboratory and pathology data and went over findings with patient    Pathology with embryonal carcinoma  Elevated tumor markers as in HPI  Creat 0.89 (GFR > 60)  PSA 0.21      Imaging:    I directly visualized and reviewed all applicable imaging a with patient.    CT abd/pelvis 9/29/2023  MPRESSION:   1.  Diffuse mild/moderately enlarged lymph nodes throughout   abdominal/pelvic retroperitoneum, mesentery, and left groin.   2.  New indeterminate 1 cm right lung base nodule.   3.  Findings suggest malignancy, consider lymphoma. Testicles/scrotum   not included on imaging field of view, correlate with physical exam   or ultrasound to exclude testicular mass/neoplasm. Consider chest CT   correlation to complete staging. Lymph nodes would be amenable to   CT-guided biopsy.   4.  No bowel inflammation or other acute findings. Solid abdominal   organs unremarkable.     CT Chest 10/2/2023  IMPRESSION:   1.  Multiple indeterminate well-circumscribed solid pulmonary nodules   in the right lung as detailed above. The largest measures 10 mm at   the right lung base and another measures 8 mm in the medial right   middle lobe. These are indeterminate and may represent metastatic   disease given the lymphadenopathy in the abdomen and pelvis.     2.  No evidence for lymphadenopathy involving the mediastinum or   either axillary region.       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 We had a long discussion today about the nature of germ cell tumors. While his clinical scenario is typical for testis cancer, he does not have any visible testis lesions at this time. We discussed that while he has metastatic germ cell tumor, this can be either from a burned out primary testis tumor that is no longer visible, or could be an extragonadal germ cell tumor. Regardless, given the extent of lymphadenopathy and apparent lung involvement, the primary modality of treatment is chemotherapy at this time. He is already working with medical oncology and has sperm preservation planned for later this week.    With regard to possible surgical involvement in his care, we discussed that the vast majority of germ cell tumors are treatable with chemotherapy and/or surgery. We discussed that given no identifiable testis lesion at this time, I would not recommend orchiectomy. With regard to his current disease burden, would be most prudent to start with chemotherapy and evaluated residual disease after this. Should he have complete response, he would likely be managed with ongoing surveillance. Should he have residual post-chemotherapy masses, however, we would likely recommend surgical  resection of any residual lymph nodes or masses. Delroy and his mother asked excellent questions today. I will plan to see him back after he completes chemotherapy to review his scans and further discuss possible RPLND at that time.     Plan:  Proceed with chemotherapy  Follow-up with me in 3-4 months to review scans and discuss RPLND      Video-Visit Details    Type of service:  Video Visit    Video End Time:5:03 PM    Originating Location  (pt. Location): Home    Distant Location (provider location):  On-site    Platform used for Video Visit: Olinda Gilmore MD  Urology  [facility]

## 2023-10-25 ENCOUNTER — TELEPHONE (OUTPATIENT)
Dept: ONCOLOGY | Facility: CLINIC | Age: 28
End: 2023-10-25
Payer: COMMERCIAL

## 2023-10-25 ENCOUNTER — TELEPHONE (OUTPATIENT)
Dept: UROLOGY | Facility: CLINIC | Age: 28
End: 2023-10-25
Payer: COMMERCIAL

## 2023-10-25 DIAGNOSIS — G89.3 CANCER ASSOCIATED PAIN: Primary | ICD-10-CM

## 2023-10-25 RX ORDER — HYDROMORPHONE HYDROCHLORIDE 2 MG/1
2 TABLET ORAL EVERY 6 HOURS PRN
Qty: 30 TABLET | Refills: 0 | Status: SHIPPED | OUTPATIENT
Start: 2023-10-25 | End: 2023-10-31

## 2023-10-25 NOTE — TELEPHONE ENCOUNTER
Pt's mom, Deb, is calling.  Writer also talked with pt to obtain permission to discuss his care with his mom, and pt states that this is fine.  Pt was advised to complete Consent to Communicate form when he is seen in clinic next time.    Pt was seen in ED at [facility] in [place] on 10/23/2023 with tightness in his chest and shortness of breath. CT scan was completed which shows increase in size of multiple tumor lesions.    Pt is requesting refill of pain medication. States that he was given Rx for dilaudid 2 mg tablets to be taken every 4 hours as needed for pain in the ED, and this has been very effective in treating his pain. He has also been taking tylenol 1000 mg twice daily. He only has 2 tablets left of dilaudid. He also has Rx for oxycodone at home, but the dilaudid seems to be more effective.    States that pain is located all over his body in his chest, ribcage, abdomen, pelvis, groin, and testicles. Rates pain at 8 out of 10 before taking pain medication. Pain comes and goes and there is no pattern to pain. Describes pain as sharp at times and dull at other times.    Denies any shortness of breath today. No numbness/tingling or other emergent symptoms.     Has port placement on 10/27/23, PET scan on 10/28/23, and follow up with Dr Wick next week on 10/31/2023.  Requesting Rx for dilaudid to be filled at [email or web address] in [place].    Will route to care team for recommendations.    Patient verbalized understanding and agreement with plan.  Patient was instructed to call the clinic with any questions, concerns, or worsening symptoms.    Connie Molina RN on 10/25/2023 at 12:22 PM

## 2023-10-25 NOTE — TELEPHONE ENCOUNTER
Appt occurred.   
[facility] Call Center    Phone Message    May a detailed message be left on voicemail: no     Reason for Call: Other: Pt is calling in regards to his virtual appt today at 4pm with patsy. Pt has been waiting for 45 minutes. Please advise pt. Thank you.     Action Taken: Message routed to:  Other: URO    Travel Screening: Not Applicable                                                                   
3-5x/week

## 2023-10-25 NOTE — PROGRESS NOTES
Virtual Visit Details    Type of service:  Video Visit     Originating Location (pt. Location): Home    Distant Location (provider location):  On-site  Platform used for Video Visit: Olinda  Duration of video visit: 38 minutes    [facility]    Hematology/Oncology Established Patient Note      Today's Date: 10/31/23    Reason for visit: Embryonal carcinoma.    HISTORY OF PRESENT ILLNESS: Jayson Banegas is a 28 year old male with history of alcoholism and nicotine dependence in remission, depression, anxiety, and PTSD who presents with the following oncologic history:  1. 9/2023: Presented to PCP with left lower quadrant abdominal pain.  Given 2 weeks of antibiotics with no improvement.  2. 9/29/2023: CT abdomen/pelvis with contrast at [facility] showed new 10 mm noncalcified left lower lobe lung nodule; no effusion; diffuse mild/moderate abdominal/pelvic retroperitoneal lymphadenopathy, largest nodes 3.0 x 1.8 cm   retrocrural, 3.0 x 1.8 cm left periaortic, 2.5 x 2.3 cm left   periaortic, 4.2 x 2.2 cm left external iliac, 2.0 x 1.4 cm right internal iliac, 2.7 x 2.2 cm left internal iliac, and 2.8 x 1.7 cm  left inguinal. No right inguinal lymphadenopathy. Multiple small, mildly enlarged mesenteric root lymph nodes. Mild haziness mesenteric root and retroperitoneal fat. Right testicle now located in scrotum; left testicle not included in field of view; no bone lesions.  3. 9/29/2023: U/S of scrotum with Doppler showed no evidence for intratesticular mass; small benign right epididymal head cyst; small left varicocele. CBC normal except monocytes slightly elevated at 1.1; normal BMP; CRP slightly elevated at 1.04.  4. 10/02/2023: CT chest with contrast showed multiple indeterminate well-circumscribed solid pulmonary nodules in the right lung; 3 mm nodule anterior right upper lobe; 8 mm solid pulmonary nodule in right middle lobe; 6 mm nodule in lateral right lower lobe;  additional 10 mm right lower lobe lung nodule; no infiltrates or effusions; unchanged upper abdominal lymphadenopathy.  5. 10/11/2023: CT-guided biopsy of retroperitoneal lymph node at [facility] showed embryonal carcinoma, cannot rule out mixed type. Biopsy of 6 mm of right lung biopsy was negative for malignancy. LDH elevated at 1643. Beta-HCG = 856, AFP = 34. Biopsy specimen sent to [facility] for review of pathology.  6. 10/23/2023: Presented with chest pain.  CT chest at [facility] showed no PE. Multiple pulmonary nodules enlarged since 10/02/2023. Prior right lower lobe lung nodule 7 mm, now 13 mm, prior 8 mm right middle lobe nodule now 12 mm; prior 6 mm right lower lobe lung nodule now 12 mm; new 2 mm right lower lobe lung nodule; enlarging left upper quadrant mass arising from or engulfing left adrenal gland 2.2 x 4.6 cm; upper abdominal lymphadenopathy have enlarged.  7. 10/26/2023: PET/CT scan showed multiple hypermetabolic pulmonary nodules most likely reflecting   metastatic disease. Abnormal hypermetabolic left retrocrural lymph node. Otherwise no abnormal adenopathy in the neck or chest. Diffuse hypermetabolic lymphadenopathy throughout the retroperitoneum, mesentery, and bilateral iliac chains left greater   than right as well as in the left groin region.     INTERVAL HISTORY:  Delroy reports worsening pain in the lower abdominal area, most predominant on the left side.  He only has few Dilaudid pills left.    REVIEW OF SYSTEMS:   14 point ROS was reviewed and is negative other than as noted above in HPI.       HOME MEDICATIONS:  Current Outpatient Medications   Medication Sig Dispense Refill    acetaminophen (TYLENOL) 325 MG tablet Take 325-650 mg by mouth      HYDROmorphone (DILAUDID) 2 MG tablet Take 1 tablet (2 mg) by mouth every 6 hours as needed for severe pain 30 tablet 0         ALLERGIES:  Allergies   Allergen Reactions    Sulfamethoxazole-Trimethoprim Itching         PAST MEDICAL  HISTORY:  None.      PAST SURGICAL HISTORY:  [place] tooth removal.    SOCIAL HISTORY:  Social History     Socioeconomic History    Marital status: Single     Spouse name: Not on file    Number of children: Not on file    Years of education: Not on file    Highest education level: Not on file   Occupational History    Not on file   Tobacco Use    Smoking status: Former     Types: Cigarettes     Passive exposure: Never    Smokeless tobacco: Never   Substance and Sexual Activity    Alcohol use: Not on file    Drug use: Not on file    Sexual activity: Not on file   Other Topics Concern    Not on file   Social History Narrative    Not on file     Social Determinants of Health     Financial Resource Strain: Not on file   Food Insecurity: Not on file   Transportation Needs: Not on file   Physical Activity: Not on file   Stress: Not on file   Social Connections: Not on file   Interpersonal Safety: Not on file   Housing Stability: Not on file         FAMILY HISTORY:  BRCA-2 mutation on paternal side of family. Paternal great aunt with unknown type of cancer. Maternal grandparent with pancreatic cancer.    PHYSICAL EXAM:  Vital signs:  There were no vitals taken for this visit.   ECO  GENERAL: No acute distress.  EYES: No scleral icterus. No overt erythema.  RESPIRATORY: No audible cough, wheezing, or labored breathing.  MUSCULOSKELETAL: Range of motion in the neck, shoulders, and arms appear normal.  SKIN: No overt rashes, discolorations, or lesions over the face and neck.  NEUROLOGIC: Alert.  No overt tremors.  PSYCHIATRIC: Normal affect and mood.  Does not appear anxious.       LABS:  As noted in HPI.    PATHOLOGY:  Reviewed as per HPI.    IMAGING:  Reviewed as per HPI.    ASSESSMENT/PLAN:  Jayson Banegas is a 28 year old male with the following issues:  1. Embryonal carcinoma, clinical stage IIIB, [id number], S2 (pre-orchiectomy), intermediate risk  2. Probable metastases to bilateral lungs  3. Diffuse  abdominal, pelvic, and retroperitoneal lymphadenopathy  4. Fertility preservation  --Although 10/11/2023 CT-guided biopsy of lung nodule negative for malignancy, interval chest CT from 10/23/2023 showed further enlargement of multiple solid lung nodules most consistent with lung metastases. --10/11/2023 CT-guided biopsy of retroperitoneal lymph node confirmed an embryonal carcinoma, which is nonseminoma type but cannot yet rule out if mixed types are present.  Biopsy sent to [facility], with pathology 2nd review pending.  --Discussed that the biomarkers show elevated beta-HCG, LDH, and slightly elevated AFP.  The elevated AFP is indicative of nonseminoma as well.  Discussed he has stage III disease and his overall prognosis with 5-year survival rate of 73% but still curable.  --He saw urology with Dr. Gilmroe with no indication to do  orchiectomy at present time as no lesions were seen on prior U/S of scrotum.    --Delroy has now completed sperm banking and has port placed.  --I advised starting chemotherapy with BEP (bleomycin, etoposide, cisplatin) x 4 cycles (or BEP x 3 and EP for cycle 4): bleomycin days 1, 8, 15; etoposide days 1-5; cisplatin days 1-5 every 21 days per cycle.  --Discussed the potential adverse effects of this regimen, including but not limited to: pulmonary toxicity (pulmonary fibrosis), alopecia, pancytopenia, fever, neutropenia, increased risk for infection, nausea, emesis, weakness, fatigue, peripheral neuropathy, kidney failure, electrolyte derangement, hearing loss, infusion reaction.  Delroy agrees to proceed with BEP chemotherapy. I have also discussed Delroy's case with my colleague Dr. Prasad Hunter.  Neulasta or other growth factor support not advised during BEP regimen.  Will treat despite neutropenia in the subsequent cycles.  --Due to worsening abdominal pain related to his malignancy, will plan to direct admit for his first cycle of chemo tomorrow 11/1/2023.  Sign-out given to  hospitalist and Dr. Hunter who is covering heme/onc consult service this week.  --Plan for CT chest/abdomen/pelvis post-chemo to assess response.  If persistent disease with any lymph nodes > 2 cm, will have Delroy revisit Dr. Gilmore for RPLND/surgical resection.  Otherwise, surveillance post-chemo would be every 2 months year 1, every 4 months year 2, every 6 months years 3-5, annually after year 5 and check serum HCG and AFP, chest x-ray.    Heather Wick MD  Hematology/Oncology  [facility]    Total time spent today: 80 minutes in chart review, patient evaluation, counseling, documentation, test and/or medication/prescription orders, and extensive coordination of care.

## 2023-10-25 NOTE — TELEPHONE ENCOUNTER
Preston Wiseman, GRACE CNP  You; Heather Wick MD; Tierney Santo, RN1 hour ago (12:25 PM)     [facility]  Dilaudid refilled     Pt was notified that Rx for dilaudid has been sent to pharmacy.  Pt was advised to return call to clinic if medication does not provide pain relief.  Also advised pt and his mom not to take dilaudid and oxycodone that pt already has at home at the same time/day.  Pt was advised to be seen in ED immediately if he develops any chest pain, shortness of breath, numbness/tingling, difficulty with bowel/bladder, or other emergent symptoms.    Patient verbalized understanding and agreement with plan.  Patient was instructed to call the clinic with any questions, concerns, or worsening symptoms.    Connie Molina RN on 10/25/2023 at 2:11 PM

## 2023-10-26 ENCOUNTER — TELEPHONE (OUTPATIENT)
Dept: ONCOLOGY | Facility: CLINIC | Age: 28
End: 2023-10-26
Payer: COMMERCIAL

## 2023-10-26 NOTE — TELEPHONE ENCOUNTER
Jose grullon for patients mother.  Patient is scheduled for  11/10 Lab. Checking to see if patient would have a [place] lab location closer. So he would only need to make one trip.

## 2023-10-27 ENCOUNTER — APPOINTMENT (OUTPATIENT)
Dept: INTERVENTIONAL RADIOLOGY/VASCULAR | Facility: CLINIC | Age: 28
End: 2023-10-27
Attending: INTERNAL MEDICINE
Payer: COMMERCIAL

## 2023-10-27 ENCOUNTER — LAB (OUTPATIENT)
Dept: LAB | Facility: CLINIC | Age: 28
End: 2023-10-27
Payer: COMMERCIAL

## 2023-10-27 ENCOUNTER — HOSPITAL ENCOUNTER (OUTPATIENT)
Facility: CLINIC | Age: 28
Discharge: HOME | End: 2023-10-27
Admitting: RADIOLOGY
Payer: COMMERCIAL

## 2023-10-27 VITALS
HEART RATE: 90 BPM | DIASTOLIC BLOOD PRESSURE: 64 MMHG | BODY MASS INDEX: 30.07 KG/M2 | SYSTOLIC BLOOD PRESSURE: 112 MMHG | RESPIRATION RATE: 16 BRPM | HEIGHT: 69 IN | TEMPERATURE: 97.9 F | WEIGHT: 203 LBS | OXYGEN SATURATION: 96 %

## 2023-10-27 DIAGNOSIS — C80.1 EMBRYONAL CARCINOMA (H): ICD-10-CM

## 2023-10-27 LAB
ABNORMAL SPERM MORPHOLOGY: 98
ABSTINENCE DAYS: 4 DAYS (ref 2–7)
AGGLUTINATION: NO
ANALYSIS TEMP - CENTIGRADE: 23 CENTIGRADE
COLLECTION METHOD: ABNORMAL
COLLECTION SITE: ABNORMAL
CONSENT TO RELEASE TO PARTNER: YES
DAL- RECEIVED TIME: ABNORMAL
ERYTHROCYTE [DISTWIDTH] IN BLOOD BY AUTOMATED COUNT: 12.4 % (ref 10–15)
HCT VFR BLD AUTO: 43.2 % (ref 40–53)
HEAD DEFECT: 98 %
HGB BLD-MCNC: 14.2 G/DL (ref 13.3–17.7)
IMMOTILE: 77 %
LIQUEFIED: YES
MCH RBC QN AUTO: 28.5 PG (ref 26.5–33)
MCHC RBC AUTO-ENTMCNC: 32.9 G/DL (ref 31.5–36.5)
MCV RBC AUTO: 87 FL (ref 78–100)
MIDPIECE DEFECT: 43 %
NON-PROGRESSIVE MOTILITY: 3 %
NORMAL SPERM MORPHOLOGY: 2 % NORMAL FORMS
PLATELET # BLD AUTO: 351 10E3/UL (ref 150–450)
PROGRESSIVE MOTILITY: 20 %
RBC # BLD AUTO: 4.98 10E6/UL (ref 4.4–5.9)
ROUND CELLS: 0 MILLION/ML
SPECIMEN PH: 7.6 PH
SPECIMEN VOLUME: 1 ML
SPERM CONCENTRATION: 5.4 MILLION/ML
TAIL DEFECT: 26 %
TIME OF ANALYSIS: ABNORMAL
TOTAL PROGRESSIVE MOTILE NUMBER: 1 MILLION
TOTAL SPERM NUMBER: 5 MILLION
VISCOUS: NO
VITALITY: 35 %
WBC # BLD AUTO: 9 10E3/UL (ref 4–11)

## 2023-10-27 PROCEDURE — 272N000196 HC ACCESSORY CR5

## 2023-10-27 PROCEDURE — 250N000009 HC RX 250: Performed by: NURSE PRACTITIONER

## 2023-10-27 PROCEDURE — 36561 INSERT TUNNELED CV CATH: CPT

## 2023-10-27 PROCEDURE — 250N000011 HC RX IP 250 OP 636: Performed by: NURSE PRACTITIONER

## 2023-10-27 PROCEDURE — 999N000163 HC STATISTIC SIMPLE TUBE INSERTION/CHARGE, PORT, CATH, FISTULOGRAM

## 2023-10-27 PROCEDURE — 36415 COLL VENOUS BLD VENIPUNCTURE: CPT | Performed by: NURSE PRACTITIONER

## 2023-10-27 PROCEDURE — 99152 MOD SED SAME PHYS/QHP 5/>YRS: CPT

## 2023-10-27 PROCEDURE — 89259 CRYOPRESERVATION SPERM: CPT

## 2023-10-27 PROCEDURE — 85014 HEMATOCRIT: CPT | Performed by: NURSE PRACTITIONER

## 2023-10-27 PROCEDURE — C1788 PORT, INDWELLING, IMP: HCPCS

## 2023-10-27 PROCEDURE — 250N000011 HC RX IP 250 OP 636: Performed by: RADIOLOGY

## 2023-10-27 PROCEDURE — 250N000009 HC RX 250: Performed by: RADIOLOGY

## 2023-10-27 RX ORDER — NALOXONE HYDROCHLORIDE 0.4 MG/ML
0.2 INJECTION, SOLUTION INTRAMUSCULAR; INTRAVENOUS; SUBCUTANEOUS
Status: DISCONTINUED | OUTPATIENT
Start: 2023-10-27 | End: 2023-10-27 | Stop reason: HOSPADM

## 2023-10-27 RX ORDER — FLUMAZENIL 0.1 MG/ML
0.2 INJECTION, SOLUTION INTRAVENOUS
Status: DISCONTINUED | OUTPATIENT
Start: 2023-10-27 | End: 2023-10-27 | Stop reason: HOSPADM

## 2023-10-27 RX ORDER — NALOXONE HYDROCHLORIDE 0.4 MG/ML
0.4 INJECTION, SOLUTION INTRAMUSCULAR; INTRAVENOUS; SUBCUTANEOUS
Status: DISCONTINUED | OUTPATIENT
Start: 2023-10-27 | End: 2023-10-27 | Stop reason: HOSPADM

## 2023-10-27 RX ORDER — CEFAZOLIN SODIUM 2 G/100ML
2 INJECTION, SOLUTION INTRAVENOUS
Status: COMPLETED | OUTPATIENT
Start: 2023-10-27 | End: 2023-10-27

## 2023-10-27 RX ORDER — FENTANYL CITRATE 50 UG/ML
25-50 INJECTION, SOLUTION INTRAMUSCULAR; INTRAVENOUS EVERY 5 MIN PRN
Status: DISCONTINUED | OUTPATIENT
Start: 2023-10-27 | End: 2023-10-27 | Stop reason: HOSPADM

## 2023-10-27 RX ORDER — ACETAMINOPHEN 325 MG/1
650 TABLET ORAL
Status: DISCONTINUED | OUTPATIENT
Start: 2023-10-27 | End: 2023-10-27 | Stop reason: HOSPADM

## 2023-10-27 RX ORDER — HEPARIN SODIUM (PORCINE) LOCK FLUSH IV SOLN 100 UNIT/ML 100 UNIT/ML
500 SOLUTION INTRAVENOUS ONCE
Status: COMPLETED | OUTPATIENT
Start: 2023-10-27 | End: 2023-10-27

## 2023-10-27 RX ADMIN — MIDAZOLAM 0.5 MG: 1 INJECTION INTRAMUSCULAR; INTRAVENOUS at 13:17

## 2023-10-27 RX ADMIN — HEPARIN SODIUM (PORCINE) LOCK FLUSH IV SOLN 100 UNIT/ML 500 UNITS: 100 SOLUTION at 13:13

## 2023-10-27 RX ADMIN — CEFAZOLIN SODIUM 2 G: 2 INJECTION, SOLUTION INTRAVENOUS at 12:54

## 2023-10-27 RX ADMIN — FENTANYL CITRATE 50 MCG: 50 INJECTION, SOLUTION INTRAMUSCULAR; INTRAVENOUS at 13:03

## 2023-10-27 RX ADMIN — MIDAZOLAM 1 MG: 1 INJECTION INTRAMUSCULAR; INTRAVENOUS at 13:05

## 2023-10-27 RX ADMIN — MIDAZOLAM 1 MG: 1 INJECTION INTRAMUSCULAR; INTRAVENOUS at 13:00

## 2023-10-27 RX ADMIN — FENTANYL CITRATE 50 MCG: 50 INJECTION, SOLUTION INTRAMUSCULAR; INTRAVENOUS at 12:58

## 2023-10-27 RX ADMIN — FENTANYL CITRATE 50 MCG: 50 INJECTION, SOLUTION INTRAMUSCULAR; INTRAVENOUS at 13:07

## 2023-10-27 RX ADMIN — LIDOCAINE HYDROCHLORIDE 10 ML: 10; .005 INJECTION, SOLUTION EPIDURAL; INFILTRATION; INTRACAUDAL; PERINEURAL at 13:13

## 2023-10-27 RX ADMIN — SODIUM CHLORIDE 1 BAG: 9 INJECTION, SOLUTION INTRAVENOUS at 13:23

## 2023-10-27 RX ADMIN — LIDOCAINE HYDROCHLORIDE 8 ML: 10 INJECTION, SOLUTION INFILTRATION; PERINEURAL at 13:13

## 2023-10-27 RX ADMIN — FENTANYL CITRATE 25 MCG: 50 INJECTION, SOLUTION INTRAMUSCULAR; INTRAVENOUS at 13:14

## 2023-10-27 RX ADMIN — MIDAZOLAM 1 MG: 1 INJECTION INTRAMUSCULAR; INTRAVENOUS at 12:54

## 2023-10-27 ASSESSMENT — ACTIVITIES OF DAILY LIVING (ADL)
ADLS_ACUITY_SCORE: 35
ADLS_ACUITY_SCORE: 35

## 2023-10-27 NOTE — PRE-PROCEDURE
GENERAL PRE-PROCEDURE:   Procedure:  Port a catheter placement with moderate sedation  Date/Time:  10/27/2023 11:40 PM    Written consent obtained?: Yes    Risks and benefits: Risks, benefits and alternatives were discussed    Consent given by:  Patient  Patient states understanding of procedure being performed: Yes    Patient's understanding of procedure matches consent: Yes    Procedure consent matches procedure scheduled: Yes    Expected level of sedation:  Moderate  Appropriately NPO:  Yes  ASA Class:  3  Mallampati  :  Grade 2- soft palate, base of uvula, tonsillar pillars, and portion of posterior pharyngeal wall visible  Lungs:  Lungs clear with good breath sounds bilaterally  Heart:  Normal heart sounds and rate  History & Physical reviewed:  History and physical reviewed and no updates needed  Statement of review:  I have reviewed the lab findings, diagnostic data, medications, and the plan for sedation    Total time: 20 minutes    [facility] Interventional Radiology CNP ([phone]) (phone [phone])

## 2023-10-27 NOTE — PROGRESS NOTES
Care Suites Discharge Nursing Note    Patient Information  Name: Jayson Banegas  Age: 28 year old    Discharge Education:  Discharge instructions reviewed: Yes  Additional education/resources provided: no  Patient/patient representative verbalizes understanding: Yes  Patient discharging on new medications: No  Medication education completed: NA    Discharge Plans:   Discharge location: home  Discharge ride contacted: Yes  Approximate discharge time: 1420    Discharge Criteria:  Discharge criteria met and vital signs stable: Yes    Patient Belongs:  Patient belongings returned to patient: Yes    Anmol Mak RN

## 2023-10-27 NOTE — PROGRESS NOTES
Care Suites Admission Nursing Note    Patient Information  Name: Jayson Banegas  Age: 28 year old  Reason for admission: port placement  Care Suites arrival time: 1100    Visitor Information  Name: pritesh     Patient Admission/Assessment   Pre-procedure assessment complete: Yes  If abnormal assessment/labs, provider notified: N/A  NPO: Yes  Medications held per instructions/orders: N/A  Consent: obtained  If applicable, pregnancy test status: deferred  Patient oriented to room: Yes  Education/questions answered: Yes  Plan/other: port placement    Discharge Planning  Discharge name/phone number: pritesh miller [phone]  Overnight post sedation caregiver: above  Discharge location: home    Yash Sarkar RN

## 2023-10-27 NOTE — IR NOTE
Interventional Radiology Intra-procedural Nursing Note    Patient Name: Jayson Banegas  Medical Record Number: [id number]  Today's Date: October 27, 2023    Procedure: Right Chest Wall Port Placement  Start time: 1258  End time: 1322  Report provided to: Brenda SANTANA    Note: Patient entered Interventional Radiology Suite number 1 via cart. Patient awake, alert and orientated. Assisted onto procedural table in supine position. Prepped and draped.  Dr. Harris in room. Time out and procedure started. Vital signs stable. Telemetry reading NSR.    Procedure well tolerated by patient without complications. Procedure end with debrief by Dr. Harris.      Administered medication totals:  Lidocaine 1% 8 mL Intradermal  Lidocaine with Epinephrine 10 mL Intradermal  Heparin 500 Units Port Lock  Versed 3.5 mg IVP  Fentanyl 175 mcg IVP    Last dose of sedation administered at 1317.

## 2023-10-27 NOTE — DISCHARGE INSTRUCTIONS
Port Insertion Discharge Instructions     After you go home:    Have an adult stay with you for the first 6 hours  You may resume your normal diet       For 24 hours - due to the sedation you received:  Relax and take it easy  Do NOT make any important or legal decisions  Do NOT drive or operate machines at home or at work  Do NOT drink alcohol    Care of Puncture Sites:    Keep the dressings on your sites clean & dry for 24-48 hours. Change it only if it gets wet or dirty.  You may take a shower 24-48 hours after your procedure. Do not scrub site.  No submerging site for 2 weeks or until the incision is completely healed.  Do not remove the small white strips of tape, if present. Allow them to fall off on their own.   You may cover the wound with a bandaid if needed for comfort.      Activity     Avoid heavy lifting (greater than 10 pounds) or the overuse of your shoulder for 48-72 hours.    Bleeding:    If you start bleeding from the incision sites in your chest or neck - or have swelling in your neck, sit down and press on the site for 5-10 minutes.   If bleeding has not stopped after 10 minutes, call your provider.        Call 911 right away if you have heavy bleeding or bleeding that does not stop.      Medicines:    You may resume all medications  Resume your Warfarin/Coumadin at your regular dose today. Follow up with your provider to have your INR rechecked  Resume your Platelet Inhibitors and Aspirin tomorrow at your regular dose  For minor pain, you may take Acetaminophen (Tylenol) or Ibuprofen (Advil)    Call the provider who ordered this procedure if:    You have swelling in your neck or over your port site  The incision area is red, swollen, hot or tender  You have chills or a fever greater than 101 F (38 C)  Any questions or concerns    Call  911 or go to the Emergency Room if you have:    Severe chest pain or trouble breathing  Bleeding that you cannot control    Additional Information:    Your port  may be accessed right away.   You will need to have your port flushed every 30 days or after each use.      If you have questions call:          [facility] Radiology Dept @ [phone]      The provider who performed your procedure was ____Dr. Harris____.

## 2023-10-27 NOTE — PROGRESS NOTES
Care Suites Post Procedure Note    Patient Information  Name: Jayson Banegas  Age: 28 year old    Post Procedure  Time patient returned to Care Suites: 1315  Concerns/abnormal assessment: none  If abnormal assessment, provider notified: N/A  Plan/Other: discharge this afternoon     Anmol Mak RN

## 2023-10-30 PROBLEM — F43.10 PTSD (POST-TRAUMATIC STRESS DISORDER): Chronic | Status: ACTIVE | Noted: 2021-07-16

## 2023-10-30 PROBLEM — F33.1 MODERATE EPISODE OF RECURRENT MAJOR DEPRESSIVE DISORDER (H): Chronic | Status: ACTIVE | Noted: 2023-10-30

## 2023-10-30 PROBLEM — F41.1 GAD (GENERALIZED ANXIETY DISORDER): Chronic | Status: ACTIVE | Noted: 2023-10-30

## 2023-10-30 PROBLEM — F17.211 CIGARETTE NICOTINE DEPENDENCE IN REMISSION: Chronic | Status: ACTIVE | Noted: 2021-06-15

## 2023-10-30 PROBLEM — F10.21 ALCOHOLISM IN REMISSION (H): Chronic | Status: ACTIVE | Noted: 2021-06-15

## 2023-10-30 PROBLEM — T74.22XA CONFIRMED VICTIM OF SEXUAL ABUSE IN CHILDHOOD: Chronic | Status: ACTIVE | Noted: 2021-07-16

## 2023-10-30 PROBLEM — R73.9 HYPERGLYCEMIA: Chronic | Status: ACTIVE | Noted: 2021-07-16

## 2023-10-31 ENCOUNTER — VIRTUAL VISIT (OUTPATIENT)
Dept: ONCOLOGY | Facility: CLINIC | Age: 28
End: 2023-10-31
Payer: COMMERCIAL

## 2023-10-31 ENCOUNTER — PATIENT OUTREACH (OUTPATIENT)
Dept: ONCOLOGY | Facility: CLINIC | Age: 28
End: 2023-10-31

## 2023-10-31 VITALS — HEIGHT: 69 IN | BODY MASS INDEX: 30.21 KG/M2 | WEIGHT: 204 LBS

## 2023-10-31 DIAGNOSIS — C78.02 MALIGNANT NEOPLASM METASTATIC TO BOTH LUNGS (H): ICD-10-CM

## 2023-10-31 DIAGNOSIS — C77.8 MALIGNANT NEOPLASM METASTATIC TO LYMPH NODES OF MULTIPLE SITES (H): ICD-10-CM

## 2023-10-31 DIAGNOSIS — C78.01 MALIGNANT NEOPLASM METASTATIC TO BOTH LUNGS (H): ICD-10-CM

## 2023-10-31 DIAGNOSIS — C80.1 EMBRYONAL CARCINOMA (H): Primary | ICD-10-CM

## 2023-10-31 DIAGNOSIS — G89.3 CANCER ASSOCIATED PAIN: ICD-10-CM

## 2023-10-31 PROCEDURE — 99215 OFFICE O/P EST HI 40 MIN: CPT | Mod: VID | Performed by: INTERNAL MEDICINE

## 2023-10-31 PROCEDURE — 99417 PROLNG OP E/M EACH 15 MIN: CPT | Mod: VID | Performed by: INTERNAL MEDICINE

## 2023-10-31 RX ORDER — HYDROMORPHONE HYDROCHLORIDE 2 MG/1
2 TABLET ORAL EVERY 6 HOURS PRN
Qty: 60 TABLET | Refills: 0 | Status: ON HOLD | OUTPATIENT
Start: 2023-10-31 | End: 2023-11-05

## 2023-10-31 RX ORDER — MEPERIDINE HYDROCHLORIDE 25 MG/ML
25 INJECTION INTRAMUSCULAR; INTRAVENOUS; SUBCUTANEOUS EVERY 30 MIN PRN
Status: CANCELLED | OUTPATIENT
Start: 2023-11-01

## 2023-10-31 RX ORDER — LORAZEPAM 2 MG/ML
.5-1 INJECTION INTRAMUSCULAR EVERY 6 HOURS PRN
Status: CANCELLED | OUTPATIENT
Start: 2023-11-01

## 2023-10-31 RX ORDER — METHYLPREDNISOLONE SODIUM SUCCINATE 125 MG/2ML
125 INJECTION, POWDER, LYOPHILIZED, FOR SOLUTION INTRAMUSCULAR; INTRAVENOUS
Status: CANCELLED
Start: 2023-11-01

## 2023-10-31 RX ORDER — PROCHLORPERAZINE MALEATE 5 MG
10 TABLET ORAL EVERY 6 HOURS PRN
Status: CANCELLED
Start: 2023-11-01

## 2023-10-31 RX ORDER — ALBUTEROL SULFATE 0.83 MG/ML
2.5 SOLUTION RESPIRATORY (INHALATION)
Status: CANCELLED | OUTPATIENT
Start: 2023-11-01

## 2023-10-31 RX ORDER — DEXAMETHASONE 0.5 MG/1
12 TABLET ORAL EVERY 24 HOURS
Status: CANCELLED
Start: 2023-11-01

## 2023-10-31 RX ORDER — ALBUTEROL SULFATE 90 UG/1
1-2 AEROSOL, METERED RESPIRATORY (INHALATION)
Status: CANCELLED
Start: 2023-11-01

## 2023-10-31 RX ORDER — DEXAMETHASONE 0.5 MG/1
8 TABLET ORAL DAILY
Status: CANCELLED
Start: 2023-11-06

## 2023-10-31 RX ORDER — DIPHENHYDRAMINE HYDROCHLORIDE 50 MG/ML
50 INJECTION INTRAMUSCULAR; INTRAVENOUS
Status: CANCELLED
Start: 2023-11-01

## 2023-10-31 RX ORDER — LORAZEPAM 0.5 MG/1
.5-1 TABLET ORAL EVERY 6 HOURS PRN
Status: CANCELLED
Start: 2023-11-01

## 2023-10-31 RX ORDER — LIDOCAINE/PRILOCAINE 2.5 %-2.5%
CREAM (GRAM) TOPICAL
Qty: 30 G | Refills: 3 | Status: SHIPPED | OUTPATIENT
Start: 2023-10-31 | End: 2024-05-01

## 2023-10-31 RX ORDER — EPINEPHRINE 1 MG/ML
0.3 INJECTION, SOLUTION INTRAMUSCULAR; SUBCUTANEOUS EVERY 5 MIN PRN
Status: CANCELLED | OUTPATIENT
Start: 2023-11-01

## 2023-10-31 ASSESSMENT — PAIN SCALES - GENERAL: PAINLEVEL: SEVERE PAIN (7)

## 2023-10-31 NOTE — LETTER
10/31/2023         RE: Jayson Banegas  [address]  [address]  [place] [address]        Dear Colleague,    Thank you for referring your patient, Jayson Banegas, to the [facility] [place]. Please see a copy of my visit note below.    Virtual Visit Details    Type of service:  Video Visit     Originating Location (pt. Location): Home    Distant Location (provider location):  On-site  Platform used for Video Visit: [employer]  Duration of video visit: 38 minutes    [facility]    Hematology/Oncology Established Patient Note      Today's Date: 10/31/23    Reason for visit: Embryonal carcinoma.    HISTORY OF PRESENT ILLNESS: Jayson Banegas is a 28 year old male with history of alcoholism and nicotine dependence in remission, depression, anxiety, and PTSD who presents with the following oncologic history:  1. 9/2023: Presented to PCP with left lower quadrant abdominal pain.  Given 2 weeks of antibiotics with no improvement.  2. 9/29/2023: CT abdomen/pelvis with contrast at [facility] showed new 10 mm noncalcified left lower lobe lung nodule; no effusion; diffuse mild/moderate abdominal/pelvic retroperitoneal lymphadenopathy, largest nodes 3.0 x 1.8 cm   retrocrural, 3.0 x 1.8 cm left periaortic, 2.5 x 2.3 cm left   periaortic, 4.2 x 2.2 cm left external iliac, 2.0 x 1.4 cm right internal iliac, 2.7 x 2.2 cm left internal iliac, and 2.8 x 1.7 cm  left inguinal. No right inguinal lymphadenopathy. Multiple small, mildly enlarged mesenteric root lymph nodes. Mild haziness mesenteric root and retroperitoneal fat. Right testicle now located in scrotum; left testicle not included in field of view; no bone lesions.  3. 9/29/2023: U/S of scrotum with Doppler showed no evidence for intratesticular mass; small benign right epididymal head cyst; small left varicocele. CBC normal except monocytes slightly elevated at 1.1; normal BMP; CRP slightly elevated  at 1.04.  4. 10/02/2023: CT chest with contrast showed multiple indeterminate well-circumscribed solid pulmonary nodules in the right lung; 3 mm nodule anterior right upper lobe; 8 mm solid pulmonary nodule in right middle lobe; 6 mm nodule in lateral right lower lobe; additional 10 mm right lower lobe lung nodule; no infiltrates or effusions; unchanged upper abdominal lymphadenopathy.  5. 10/11/2023: CT-guided biopsy of retroperitoneal lymph node at [facility] showed embryonal carcinoma, cannot rule out mixed type. Biopsy of 6 mm of right lung biopsy was negative for malignancy. LDH elevated at 1643. Beta-HCG = 856, AFP = 34. Biopsy specimen sent to [facility] for review of pathology.  6. 10/23/2023: Presented with chest pain.  CT chest at [facility] showed no PE. Multiple pulmonary nodules enlarged since 10/02/2023. Prior right lower lobe lung nodule 7 mm, now 13 mm, prior 8 mm right middle lobe nodule now 12 mm; prior 6 mm right lower lobe lung nodule now 12 mm; new 2 mm right lower lobe lung nodule; enlarging left upper quadrant mass arising from or engulfing left adrenal gland 2.2 x 4.6 cm; upper abdominal lymphadenopathy have enlarged.  7. 10/26/2023: PET/CT scan showed multiple hypermetabolic pulmonary nodules most likely reflecting   metastatic disease. Abnormal hypermetabolic left retrocrural lymph node. Otherwise no abnormal adenopathy in the neck or chest. Diffuse hypermetabolic lymphadenopathy throughout the retroperitoneum, mesentery, and bilateral iliac chains left greater   than right as well as in the left groin region.     INTERVAL HISTORY:  Delroy reports worsening pain in the lower abdominal area, most predominant on the left side.  He only has few Dilaudid pills left.    REVIEW OF SYSTEMS:   14 point ROS was reviewed and is negative other than as noted above in HPI.       HOME MEDICATIONS:  Current Outpatient Medications   Medication Sig Dispense Refill     acetaminophen (TYLENOL) 325  MG tablet Take 325-650 mg by mouth       HYDROmorphone (DILAUDID) 2 MG tablet Take 1 tablet (2 mg) by mouth every 6 hours as needed for severe pain 30 tablet 0         ALLERGIES:  Allergies   Allergen Reactions     Sulfamethoxazole-Trimethoprim Itching         PAST MEDICAL HISTORY:  None.      PAST SURGICAL HISTORY:  [place] tooth removal.    SOCIAL HISTORY:  Social History     Socioeconomic History     Marital status: Single     Spouse name: Not on file     Number of children: Not on file     Years of education: Not on file     Highest education level: Not on file   Occupational History     Not on file   Tobacco Use     Smoking status: Former     Types: Cigarettes     Passive exposure: Never     Smokeless tobacco: Never   Substance and Sexual Activity     Alcohol use: Not on file     Drug use: Not on file     Sexual activity: Not on file   Other Topics Concern     Not on file   Social History Narrative     Not on file     Social Determinants of Health     Financial Resource Strain: Not on file   Food Insecurity: Not on file   Transportation Needs: Not on file   Physical Activity: Not on file   Stress: Not on file   Social Connections: Not on file   Interpersonal Safety: Not on file   Housing Stability: Not on file         FAMILY HISTORY:  BRCA-2 mutation on paternal side of family. Paternal great aunt with unknown type of cancer. Maternal grandparent with pancreatic cancer.    PHYSICAL EXAM:  Vital signs:  There were no vitals taken for this visit.   ECO  GENERAL: No acute distress.  EYES: No scleral icterus. No overt erythema.  RESPIRATORY: No audible cough, wheezing, or labored breathing.  MUSCULOSKELETAL: Range of motion in the neck, shoulders, and arms appear normal.  SKIN: No overt rashes, discolorations, or lesions over the face and neck.  NEUROLOGIC: Alert.  No overt tremors.  PSYCHIATRIC: Normal affect and mood.  Does not appear anxious.       LABS:  As noted in HPI.    PATHOLOGY:  Reviewed as per  HPI.    IMAGING:  Reviewed as per HPI.    ASSESSMENT/PLAN:  Jayson Banegas is a 28 year old male with the following issues:  1. Embryonal carcinoma, clinical stage IIIB, [id number], S2 (pre-orchiectomy), intermediate risk  2. Probable metastases to bilateral lungs  3. Diffuse abdominal, pelvic, and retroperitoneal lymphadenopathy  4. Fertility preservation  --Although 10/11/2023 CT-guided biopsy of lung nodule negative for malignancy, interval chest CT from 10/23/2023 showed further enlargement of multiple solid lung nodules most consistent with lung metastases. --10/11/2023 CT-guided biopsy of retroperitoneal lymph node confirmed an embryonal carcinoma, which is nonseminoma type but cannot yet rule out if mixed types are present.  Biopsy sent to [facility], with pathology 2nd review pending.  --Discussed that the biomarkers show elevated beta-HCG, LDH, and slightly elevated AFP.  The elevated AFP is indicative of nonseminoma as well.  Discussed he has stage III disease and his overall prognosis with 5-year survival rate of 73% but still curable.  --He saw urology with Dr. Gilmore with no indication to do  orchiectomy at present time as no lesions were seen on prior U/S of scrotum.    --Delroy has now completed sperm banking and has port placed.  --I advised starting chemotherapy with BEP (bleomycin, etoposide, cisplatin) x 4 cycles (or BEP x 3 and EP for cycle 4): bleomycin days 1, 8, 15; etoposide days 1-5; cisplatin days 1-5 every 21 days per cycle.  --Discussed the potential adverse effects of this regimen, including but not limited to: pulmonary toxicity (pulmonary fibrosis), alopecia, pancytopenia, fever, neutropenia, increased risk for infection, nausea, emesis, weakness, fatigue, peripheral neuropathy, kidney failure, electrolyte derangement, hearing loss, infusion reaction.  Delroy agrees to proceed with BEP chemotherapy. I have also discussed Delroy's case with my colleague Dr. Prasad Hunter.   Neulasta or other growth factor support not advised during BEP regimen.  Will treat despite neutropenia in the subsequent cycles.  --Due to worsening abdominal pain related to his malignancy, will plan to direct admit for his first cycle of chemo tomorrow 11/1/2023.  Sign-out given to hospitalist and Dr. Hunter who is covering heme/onc consult service this week.  --Plan for CT chest/abdomen/pelvis post-chemo to assess response.  If persistent disease with any lymph nodes > 2 cm, will have Delroy revisit Dr. Gilmore for RPLND/surgical resection.  Otherwise, surveillance post-chemo would be every 2 months year 1, every 4 months year 2, every 6 months years 3-5, annually after year 5 and check serum HCG and AFP, chest x-ray.    Heather Wick MD  Hematology/Oncology  [facility]    Total time spent today: 80 minutes in chart review, patient evaluation, counseling, documentation, test and/or medication/prescription orders, and extensive coordination of care.        Again, thank you for allowing me to participate in the care of your patient.        Sincerely,        Heather Wick MD

## 2023-10-31 NOTE — NURSING NOTE
Is the patient currently in the state of [place]? YES    Visit mode:VIDEO    If the visit is dropped, the patient can be reconnected by: VIDEO VISIT: Text to cell phone:   Telephone Information:   Mobile [phone]       Will anyone else be joining the visit? NO  (If patient encounters technical issues they should call [phone]    How would you like to obtain your AVS? MyChart    Are changes needed to the allergy or medication list? Pt stated no changes to allergies and Pt stated no med changes    Reason for visit: VITO Anne LPN

## 2023-10-31 NOTE — PROGRESS NOTES
Spoke to Myriam, charge nurse from 88  Patient will be able to come to the unit as a direct admit b/t 8-9:00am tomorrow morning for chemo and pain control   Deb (Mother) made aware   [facility] [place] referral sent

## 2023-10-31 NOTE — PATIENT INSTRUCTIONS
Arrange for bleomycin days 8 and 15 on 11/8 and 11/15.  Arrange for bleomycin days 1, 8, 15; etoposide days 1-5, cisplatin days 1-5 with day 1 on 11/27/2023.  Lab draw weekly.  RTC NP 11/27/2023 with lab draw.  RTC MD on 12/18 at 11:20am (add-on) with lab draw and CT chest/abdomen/pelvis prior to visit.  Arrange for bleomycin days 1, 8, 15; etoposide days 1-5, cisplatin days 1-5 with day 1 on 12/18/2023.

## 2023-10-31 NOTE — LETTER
10/31/2023         RE: Jayson Banegas  [address]  [address]  [place] [address]        Dear Colleague,    Thank you for referring your patient, Jayson Banegas, to the [facility] [place]. Please see a copy of my visit note below.    Virtual Visit Details    Type of service:  Video Visit     Originating Location (pt. Location): Home    Distant Location (provider location):  On-site  Platform used for Video Visit: [employer]  Duration of video visit: 38 minutes    [facility]    Hematology/Oncology Established Patient Note      Today's Date: 10/31/23    Reason for visit: Embryonal carcinoma.    HISTORY OF PRESENT ILLNESS: Jayson Banegas is a 28 year old male with history of alcoholism and nicotine dependence in remission, depression, anxiety, and PTSD who presents with the following oncologic history:  1. 9/2023: Presented to PCP with left lower quadrant abdominal pain.  Given 2 weeks of antibiotics with no improvement.  2. 9/29/2023: CT abdomen/pelvis with contrast at [facility] showed new 10 mm noncalcified left lower lobe lung nodule; no effusion; diffuse mild/moderate abdominal/pelvic retroperitoneal lymphadenopathy, largest nodes 3.0 x 1.8 cm   retrocrural, 3.0 x 1.8 cm left periaortic, 2.5 x 2.3 cm left   periaortic, 4.2 x 2.2 cm left external iliac, 2.0 x 1.4 cm right internal iliac, 2.7 x 2.2 cm left internal iliac, and 2.8 x 1.7 cm  left inguinal. No right inguinal lymphadenopathy. Multiple small, mildly enlarged mesenteric root lymph nodes. Mild haziness mesenteric root and retroperitoneal fat. Right testicle now located in scrotum; left testicle not included in field of view; no bone lesions.  3. 9/29/2023: U/S of scrotum with Doppler showed no evidence for intratesticular mass; small benign right epididymal head cyst; small left varicocele. CBC normal except monocytes slightly elevated at 1.1; normal BMP; CRP slightly elevated  at 1.04.  4. 10/02/2023: CT chest with contrast showed multiple indeterminate well-circumscribed solid pulmonary nodules in the right lung; 3 mm nodule anterior right upper lobe; 8 mm solid pulmonary nodule in right middle lobe; 6 mm nodule in lateral right lower lobe; additional 10 mm right lower lobe lung nodule; no infiltrates or effusions; unchanged upper abdominal lymphadenopathy.  5. 10/11/2023: CT-guided biopsy of retroperitoneal lymph node at [facility] showed embryonal carcinoma, cannot rule out mixed type. Biopsy of 6 mm of right lung biopsy was negative for malignancy. LDH elevated at 1643. Beta-HCG = 856, AFP = 34. Biopsy specimen sent to [facility] for review of pathology.  6. 10/23/2023: Presented with chest pain.  CT chest at [facility] showed no PE. Multiple pulmonary nodules enlarged since 10/02/2023. Prior right lower lobe lung nodule 7 mm, now 13 mm, prior 8 mm right middle lobe nodule now 12 mm; prior 6 mm right lower lobe lung nodule now 12 mm; new 2 mm right lower lobe lung nodule; enlarging left upper quadrant mass arising from or engulfing left adrenal gland 2.2 x 4.6 cm; upper abdominal lymphadenopathy have enlarged.  7. 10/26/2023: PET/CT scan showed multiple hypermetabolic pulmonary nodules most likely reflecting   metastatic disease. Abnormal hypermetabolic left retrocrural lymph node. Otherwise no abnormal adenopathy in the neck or chest. Diffuse hypermetabolic lymphadenopathy throughout the retroperitoneum, mesentery, and bilateral iliac chains left greater   than right as well as in the left groin region.     INTERVAL HISTORY:  Delroy reports worsening pain in the lower abdominal area, most predominant on the left side.  He only has few Dilaudid pills left.    REVIEW OF SYSTEMS:   14 point ROS was reviewed and is negative other than as noted above in HPI.       HOME MEDICATIONS:  Current Outpatient Medications   Medication Sig Dispense Refill     acetaminophen (TYLENOL) 325  MG tablet Take 325-650 mg by mouth       HYDROmorphone (DILAUDID) 2 MG tablet Take 1 tablet (2 mg) by mouth every 6 hours as needed for severe pain 30 tablet 0         ALLERGIES:  Allergies   Allergen Reactions     Sulfamethoxazole-Trimethoprim Itching         PAST MEDICAL HISTORY:  None.      PAST SURGICAL HISTORY:  [place] tooth removal.    SOCIAL HISTORY:  Social History     Socioeconomic History     Marital status: Single     Spouse name: Not on file     Number of children: Not on file     Years of education: Not on file     Highest education level: Not on file   Occupational History     Not on file   Tobacco Use     Smoking status: Former     Types: Cigarettes     Passive exposure: Never     Smokeless tobacco: Never   Substance and Sexual Activity     Alcohol use: Not on file     Drug use: Not on file     Sexual activity: Not on file   Other Topics Concern     Not on file   Social History Narrative     Not on file     Social Determinants of Health     Financial Resource Strain: Not on file   Food Insecurity: Not on file   Transportation Needs: Not on file   Physical Activity: Not on file   Stress: Not on file   Social Connections: Not on file   Interpersonal Safety: Not on file   Housing Stability: Not on file         FAMILY HISTORY:  BRCA-2 mutation on paternal side of family. Paternal great aunt with unknown type of cancer. Maternal grandparent with pancreatic cancer.    PHYSICAL EXAM:  Vital signs:  There were no vitals taken for this visit.   ECO  GENERAL: No acute distress.  EYES: No scleral icterus. No overt erythema.  RESPIRATORY: No audible cough, wheezing, or labored breathing.  MUSCULOSKELETAL: Range of motion in the neck, shoulders, and arms appear normal.  SKIN: No overt rashes, discolorations, or lesions over the face and neck.  NEUROLOGIC: Alert.  No overt tremors.  PSYCHIATRIC: Normal affect and mood.  Does not appear anxious.       LABS:  As noted in HPI.    PATHOLOGY:  Reviewed as per  HPI.    IMAGING:  Reviewed as per HPI.    ASSESSMENT/PLAN:  Jayson Banegas is a 28 year old male with the following issues:  1. Embryonal carcinoma, clinical stage IIIB, [id number], S2 (pre-orchiectomy), intermediate risk  2. Probable metastases to bilateral lungs  3. Diffuse abdominal, pelvic, and retroperitoneal lymphadenopathy  4. Fertility preservation  --Although 10/11/2023 CT-guided biopsy of lung nodule negative for malignancy, interval chest CT from 10/23/2023 showed further enlargement of multiple solid lung nodules most consistent with lung metastases. --10/11/2023 CT-guided biopsy of retroperitoneal lymph node confirmed an embryonal carcinoma, which is nonseminoma type but cannot yet rule out if mixed types are present.  Biopsy sent to [facility], with pathology 2nd review pending.  --Discussed that the biomarkers show elevated beta-HCG, LDH, and slightly elevated AFP.  The elevated AFP is indicative of nonseminoma as well.  Discussed he has stage III disease and his overall prognosis with 5-year survival rate of 73% but still curable.  --He saw urology with Dr. Gilmore with no indication to do  orchiectomy at present time as no lesions were seen on prior U/S of scrotum.    --Delroy has now completed sperm banking and has port placed.  --I advised starting chemotherapy with BEP (bleomycin, etoposide, cisplatin) x 4 cycles (or BEP x 3 and EP for cycle 4): bleomycin days 1, 8, 15; etoposide days 1-5; cisplatin days 1-5 every 21 days per cycle.  --Discussed the potential adverse effects of this regimen, including but not limited to: pulmonary toxicity (pulmonary fibrosis), alopecia, pancytopenia, fever, neutropenia, increased risk for infection, nausea, emesis, weakness, fatigue, peripheral neuropathy, kidney failure, electrolyte derangement, hearing loss, infusion reaction.  Delroy agrees to proceed with BEP chemotherapy. I have also discussed Delroy's case with my colleague Dr. Prasad Hunter.   Neulasta or other growth factor support not advised during BEP regimen.  Will treat despite neutropenia in the subsequent cycles.  --Due to worsening abdominal pain related to his malignancy, will plan to direct admit for his first cycle of chemo tomorrow 11/1/2023.  Sign-out given to hospitalist and Dr. Hunter who is covering heme/onc consult service this week.  --Plan for CT chest/abdomen/pelvis post-chemo to assess response.  If persistent disease with any lymph nodes > 2 cm, will have Delroy revisit Dr. Gilmore for RPLND/surgical resection.  Otherwise, surveillance post-chemo would be every 2 months year 1, every 4 months year 2, every 6 months years 3-5, annually after year 5 and check serum HCG and AFP, chest x-ray.    Heather Wick MD  Hematology/Oncology  [facility]    Total time spent today: 80 minutes in chart review, patient evaluation, counseling, documentation, test and/or medication/prescription orders, and extensive coordination of care.        Again, thank you for allowing me to participate in the care of your patient.        Sincerely,        Heather Wick MD

## 2023-10-31 NOTE — PROGRESS NOTES
[facility]: Cancer Care                                                                                          Spoke to Kathy from patient placement for direct admission for pain and C1D1 of BEP chemo. She called station 88 to inquire about bed availability for tomorrow morning.   Station 88 will put them Delroy on the board and recommends that RNCC call back tomorrow morning for a more accurate time the patient can get admitted.    Mother Deb is updated on the plan.   Spoke with Myriam on Station 88 - provided her my direct line and she will call with any updates.     Sherice Short, RN, BSN  Specialty Care Coordinator  [facility]  [phone]

## 2023-11-01 ENCOUNTER — HOSPITAL ENCOUNTER (INPATIENT)
Facility: CLINIC | Age: 28
LOS: 4 days | Discharge: HOME | DRG: 847 | End: 2023-11-05
Attending: HOSPITALIST | Admitting: INTERNAL MEDICINE
Payer: COMMERCIAL

## 2023-11-01 DIAGNOSIS — G89.3 CANCER ASSOCIATED PAIN: ICD-10-CM

## 2023-11-01 DIAGNOSIS — Z51.11 ADMISSION FOR CHEMOTHERAPY: ICD-10-CM

## 2023-11-01 DIAGNOSIS — K59.03 DRUG-INDUCED CONSTIPATION: ICD-10-CM

## 2023-11-01 DIAGNOSIS — F41.1 GAD (GENERALIZED ANXIETY DISORDER): Chronic | ICD-10-CM

## 2023-11-01 DIAGNOSIS — C80.1 EMBRYONAL CARCINOMA (H): Primary | ICD-10-CM

## 2023-11-01 LAB
ALBUMIN SERPL BCG-MCNC: 4 G/DL (ref 3.5–5.2)
ALP SERPL-CCNC: 84 U/L (ref 40–129)
ALT SERPL W P-5'-P-CCNC: 23 U/L (ref 0–70)
ANION GAP SERPL CALCULATED.3IONS-SCNC: 11 MMOL/L (ref 7–15)
AST SERPL W P-5'-P-CCNC: 35 U/L (ref 0–45)
BASOPHILS # BLD AUTO: 0 10E3/UL (ref 0–0.2)
BASOPHILS NFR BLD AUTO: 1 %
BILIRUB SERPL-MCNC: 0.3 MG/DL
BUN SERPL-MCNC: 6.3 MG/DL (ref 6–20)
CALCIUM SERPL-MCNC: 9.1 MG/DL (ref 8.6–10)
CHLORIDE SERPL-SCNC: 101 MMOL/L (ref 98–107)
CREAT SERPL-MCNC: 0.66 MG/DL (ref 0.67–1.17)
DEPRECATED HCO3 PLAS-SCNC: 25 MMOL/L (ref 22–29)
EGFRCR SERPLBLD CKD-EPI 2021: >90 ML/MIN/1.73M2
EOSINOPHIL # BLD AUTO: 0 10E3/UL (ref 0–0.7)
EOSINOPHIL NFR BLD AUTO: 1 %
ERYTHROCYTE [DISTWIDTH] IN BLOOD BY AUTOMATED COUNT: 12.2 % (ref 10–15)
GLUCOSE SERPL-MCNC: 77 MG/DL (ref 70–99)
HCT VFR BLD AUTO: 40.4 % (ref 40–53)
HGB BLD-MCNC: 13.2 G/DL (ref 13.3–17.7)
IMM GRANULOCYTES # BLD: 0 10E3/UL
IMM GRANULOCYTES NFR BLD: 0 %
LYMPHOCYTES # BLD AUTO: 1 10E3/UL (ref 0.8–5.3)
LYMPHOCYTES NFR BLD AUTO: 20 %
MAGNESIUM SERPL-MCNC: 2.2 MG/DL (ref 1.7–2.3)
MCH RBC QN AUTO: 28.1 PG (ref 26.5–33)
MCHC RBC AUTO-ENTMCNC: 32.7 G/DL (ref 31.5–36.5)
MCV RBC AUTO: 86 FL (ref 78–100)
MONOCYTES # BLD AUTO: 0.6 10E3/UL (ref 0–1.3)
MONOCYTES NFR BLD AUTO: 12 %
NEUTROPHILS # BLD AUTO: 3.6 10E3/UL (ref 1.6–8.3)
NEUTROPHILS NFR BLD AUTO: 66 %
NRBC # BLD AUTO: 0 10E3/UL
NRBC BLD AUTO-RTO: 0 /100
PLATELET # BLD AUTO: 326 10E3/UL (ref 150–450)
POTASSIUM SERPL-SCNC: 4.1 MMOL/L (ref 3.4–5.3)
PROT SERPL-MCNC: 7.5 G/DL (ref 6.4–8.3)
RBC # BLD AUTO: 4.7 10E6/UL (ref 4.4–5.9)
SODIUM SERPL-SCNC: 137 MMOL/L (ref 135–145)
WBC # BLD AUTO: 5.3 10E3/UL (ref 4–11)

## 2023-11-01 PROCEDURE — 80053 COMPREHEN METABOLIC PANEL: CPT | Performed by: PHYSICIAN ASSISTANT

## 2023-11-01 PROCEDURE — 85025 COMPLETE CBC W/AUTO DIFF WBC: CPT | Performed by: HOSPITALIST

## 2023-11-01 PROCEDURE — 99222 1ST HOSP IP/OBS MODERATE 55: CPT | Performed by: PHYSICIAN ASSISTANT

## 2023-11-01 PROCEDURE — 250N000011 HC RX IP 250 OP 636: Mod: JZ | Performed by: INTERNAL MEDICINE

## 2023-11-01 PROCEDURE — 3E04305 INTRODUCTION OF OTHER ANTINEOPLASTIC INTO CENTRAL VEIN, PERCUTANEOUS APPROACH: ICD-10-PCS | Performed by: INTERNAL MEDICINE

## 2023-11-01 PROCEDURE — 250N000013 HC RX MED GY IP 250 OP 250 PS 637: Performed by: PHYSICIAN ASSISTANT

## 2023-11-01 PROCEDURE — 250N000012 HC RX MED GY IP 250 OP 636 PS 637: Performed by: INTERNAL MEDICINE

## 2023-11-01 PROCEDURE — 99222 1ST HOSP IP/OBS MODERATE 55: CPT | Performed by: INTERNAL MEDICINE

## 2023-11-01 PROCEDURE — 83735 ASSAY OF MAGNESIUM: CPT | Performed by: PHYSICIAN ASSISTANT

## 2023-11-01 PROCEDURE — 120N000001 HC R&B MED SURG/OB

## 2023-11-01 PROCEDURE — 258N000003 HC RX IP 258 OP 636: Performed by: INTERNAL MEDICINE

## 2023-11-01 PROCEDURE — 36591 DRAW BLOOD OFF VENOUS DEVICE: CPT | Performed by: HOSPITALIST

## 2023-11-01 PROCEDURE — 250N000011 HC RX IP 250 OP 636: Performed by: PHYSICIAN ASSISTANT

## 2023-11-01 RX ORDER — LORAZEPAM 2 MG/ML
.5-1 INJECTION INTRAMUSCULAR EVERY 6 HOURS PRN
Status: DISCONTINUED | OUTPATIENT
Start: 2023-11-01 | End: 2023-11-05 | Stop reason: HOSPADM

## 2023-11-01 RX ORDER — HEPARIN SODIUM,PORCINE 10 UNIT/ML
5-10 VIAL (ML) INTRAVENOUS
Status: DISCONTINUED | OUTPATIENT
Start: 2023-11-01 | End: 2023-11-05 | Stop reason: HOSPADM

## 2023-11-01 RX ORDER — HYDROMORPHONE HCL IN WATER/PF 6 MG/30 ML
0.4 PATIENT CONTROLLED ANALGESIA SYRINGE INTRAVENOUS
Status: DISCONTINUED | OUTPATIENT
Start: 2023-11-01 | End: 2023-11-05 | Stop reason: HOSPADM

## 2023-11-01 RX ORDER — HEPARIN SODIUM,PORCINE 10 UNIT/ML
5-10 VIAL (ML) INTRAVENOUS EVERY 24 HOURS
Status: DISCONTINUED | OUTPATIENT
Start: 2023-11-01 | End: 2023-11-05 | Stop reason: HOSPADM

## 2023-11-01 RX ORDER — ONDANSETRON 8 MG/1
16 TABLET, FILM COATED ORAL EVERY 24 HOURS
Qty: 10 TABLET | Refills: 0 | Status: COMPLETED | OUTPATIENT
Start: 2023-11-01 | End: 2023-11-05

## 2023-11-01 RX ORDER — HYDROMORPHONE HYDROCHLORIDE 2 MG/1
4 TABLET ORAL EVERY 4 HOURS PRN
Status: DISCONTINUED | OUTPATIENT
Start: 2023-11-01 | End: 2023-11-05 | Stop reason: HOSPADM

## 2023-11-01 RX ORDER — METHYLPREDNISOLONE SODIUM SUCCINATE 125 MG/2ML
125 INJECTION, POWDER, LYOPHILIZED, FOR SOLUTION INTRAMUSCULAR; INTRAVENOUS
Status: DISCONTINUED | OUTPATIENT
Start: 2023-11-01 | End: 2023-11-05 | Stop reason: HOSPADM

## 2023-11-01 RX ORDER — ONDANSETRON 4 MG/1
4 TABLET, ORALLY DISINTEGRATING ORAL EVERY 6 HOURS PRN
Status: DISCONTINUED | OUTPATIENT
Start: 2023-11-01 | End: 2023-11-02

## 2023-11-01 RX ORDER — HYDROMORPHONE HYDROCHLORIDE 2 MG/1
2 TABLET ORAL EVERY 4 HOURS PRN
Status: DISCONTINUED | OUTPATIENT
Start: 2023-11-01 | End: 2023-11-05 | Stop reason: HOSPADM

## 2023-11-01 RX ORDER — ACETAMINOPHEN 500 MG
1000 TABLET ORAL 3 TIMES DAILY
Status: DISCONTINUED | OUTPATIENT
Start: 2023-11-01 | End: 2023-11-05 | Stop reason: HOSPADM

## 2023-11-01 RX ORDER — DEXAMETHASONE 4 MG/1
12 TABLET ORAL EVERY 24 HOURS
Qty: 15 TABLET | Refills: 0 | Status: COMPLETED | OUTPATIENT
Start: 2023-11-01 | End: 2023-11-05

## 2023-11-01 RX ORDER — EPINEPHRINE 1 MG/ML
0.3 INJECTION, SOLUTION, CONCENTRATE INTRAVENOUS EVERY 5 MIN PRN
Status: DISCONTINUED | OUTPATIENT
Start: 2023-11-01 | End: 2023-11-05 | Stop reason: HOSPADM

## 2023-11-01 RX ORDER — ALBUTEROL SULFATE 90 UG/1
1-2 AEROSOL, METERED RESPIRATORY (INHALATION)
Status: DISCONTINUED | OUTPATIENT
Start: 2023-11-01 | End: 2023-11-05 | Stop reason: HOSPADM

## 2023-11-01 RX ORDER — HYDROMORPHONE HCL IN WATER/PF 6 MG/30 ML
0.2 PATIENT CONTROLLED ANALGESIA SYRINGE INTRAVENOUS
Status: DISCONTINUED | OUTPATIENT
Start: 2023-11-01 | End: 2023-11-05 | Stop reason: HOSPADM

## 2023-11-01 RX ORDER — PROCHLORPERAZINE 25 MG
25 SUPPOSITORY, RECTAL RECTAL EVERY 12 HOURS PRN
Status: DISCONTINUED | OUTPATIENT
Start: 2023-11-01 | End: 2023-11-01

## 2023-11-01 RX ORDER — LORAZEPAM 0.5 MG/1
.5-1 TABLET ORAL 2 TIMES DAILY PRN
Status: DISCONTINUED | OUTPATIENT
Start: 2023-11-01 | End: 2023-11-01

## 2023-11-01 RX ORDER — AMOXICILLIN 250 MG
1 CAPSULE ORAL 2 TIMES DAILY
Status: DISCONTINUED | OUTPATIENT
Start: 2023-11-01 | End: 2023-11-03

## 2023-11-01 RX ORDER — NALOXONE HYDROCHLORIDE 0.4 MG/ML
0.2 INJECTION, SOLUTION INTRAMUSCULAR; INTRAVENOUS; SUBCUTANEOUS
Status: DISCONTINUED | OUTPATIENT
Start: 2023-11-01 | End: 2023-11-05 | Stop reason: HOSPADM

## 2023-11-01 RX ORDER — ONDANSETRON 2 MG/ML
4 INJECTION INTRAMUSCULAR; INTRAVENOUS EVERY 6 HOURS PRN
Status: DISCONTINUED | OUTPATIENT
Start: 2023-11-01 | End: 2023-11-02

## 2023-11-01 RX ORDER — DEXAMETHASONE 4 MG/1
8 TABLET ORAL DAILY
Qty: 6 TABLET | Refills: 0 | Status: DISCONTINUED | OUTPATIENT
Start: 2023-11-06 | End: 2023-11-05 | Stop reason: HOSPADM

## 2023-11-01 RX ORDER — MEPERIDINE HYDROCHLORIDE 25 MG/ML
25 INJECTION INTRAMUSCULAR; INTRAVENOUS; SUBCUTANEOUS EVERY 30 MIN PRN
Status: DISCONTINUED | OUTPATIENT
Start: 2023-11-01 | End: 2023-11-05 | Stop reason: HOSPADM

## 2023-11-01 RX ORDER — PROCHLORPERAZINE MALEATE 10 MG
10 TABLET ORAL EVERY 6 HOURS PRN
Status: DISCONTINUED | OUTPATIENT
Start: 2023-11-01 | End: 2023-11-05 | Stop reason: HOSPADM

## 2023-11-01 RX ORDER — DIPHENHYDRAMINE HYDROCHLORIDE 50 MG/ML
50 INJECTION INTRAMUSCULAR; INTRAVENOUS
Status: DISCONTINUED | OUTPATIENT
Start: 2023-11-01 | End: 2023-11-05 | Stop reason: HOSPADM

## 2023-11-01 RX ORDER — BISACODYL 10 MG
10 SUPPOSITORY, RECTAL RECTAL DAILY PRN
Status: DISCONTINUED | OUTPATIENT
Start: 2023-11-01 | End: 2023-11-05 | Stop reason: HOSPADM

## 2023-11-01 RX ORDER — PROCHLORPERAZINE MALEATE 10 MG
10 TABLET ORAL EVERY 6 HOURS PRN
Status: DISCONTINUED | OUTPATIENT
Start: 2023-11-01 | End: 2023-11-01

## 2023-11-01 RX ORDER — HEPARIN SODIUM (PORCINE) LOCK FLUSH IV SOLN 100 UNIT/ML 100 UNIT/ML
5-10 SOLUTION INTRAVENOUS
Status: DISCONTINUED | OUTPATIENT
Start: 2023-11-06 | End: 2023-11-05

## 2023-11-01 RX ORDER — NALOXONE HYDROCHLORIDE 0.4 MG/ML
0.4 INJECTION, SOLUTION INTRAMUSCULAR; INTRAVENOUS; SUBCUTANEOUS
Status: DISCONTINUED | OUTPATIENT
Start: 2023-11-01 | End: 2023-11-05 | Stop reason: HOSPADM

## 2023-11-01 RX ORDER — AMOXICILLIN 250 MG
2 CAPSULE ORAL 2 TIMES DAILY
Status: DISCONTINUED | OUTPATIENT
Start: 2023-11-01 | End: 2023-11-03

## 2023-11-01 RX ORDER — ACETAMINOPHEN 325 MG/1
650 TABLET ORAL EVERY 6 HOURS PRN
Status: DISCONTINUED | OUTPATIENT
Start: 2023-11-01 | End: 2023-11-05 | Stop reason: HOSPADM

## 2023-11-01 RX ORDER — ALBUTEROL SULFATE 0.83 MG/ML
2.5 SOLUTION RESPIRATORY (INHALATION)
Status: DISCONTINUED | OUTPATIENT
Start: 2023-11-01 | End: 2023-11-05 | Stop reason: HOSPADM

## 2023-11-01 RX ORDER — LORAZEPAM 0.5 MG/1
.5-1 TABLET ORAL EVERY 6 HOURS PRN
Status: DISCONTINUED | OUTPATIENT
Start: 2023-11-01 | End: 2023-11-05 | Stop reason: HOSPADM

## 2023-11-01 RX ORDER — ACETAMINOPHEN 650 MG/1
650 SUPPOSITORY RECTAL EVERY 6 HOURS PRN
Status: DISCONTINUED | OUTPATIENT
Start: 2023-11-01 | End: 2023-11-05 | Stop reason: HOSPADM

## 2023-11-01 RX ADMIN — HYDROMORPHONE HYDROCHLORIDE 4 MG: 2 TABLET ORAL at 14:07

## 2023-11-01 RX ADMIN — Medication 5 ML: at 11:27

## 2023-11-01 RX ADMIN — ETOPOSIDE 210 MG: 20 INJECTION INTRAVENOUS at 15:59

## 2023-11-01 RX ADMIN — BLEOMYCIN SULFATE 30 UNITS: 30 INJECTION, POWDER, LYOPHILIZED, FOR SOLUTION INTRAMUSCULAR; INTRAPLEURAL; INTRAVENOUS; SUBCUTANEOUS at 15:32

## 2023-11-01 RX ADMIN — HYDROMORPHONE HYDROCHLORIDE 0.2 MG: 0.2 INJECTION, SOLUTION INTRAMUSCULAR; INTRAVENOUS; SUBCUTANEOUS at 11:04

## 2023-11-01 RX ADMIN — HYDROMORPHONE HYDROCHLORIDE 4 MG: 2 TABLET ORAL at 22:05

## 2023-11-01 RX ADMIN — Medication 5 ML: at 13:04

## 2023-11-01 RX ADMIN — MAGNESIUM SULFATE HEPTAHYDRATE: 500 INJECTION, SOLUTION INTRAMUSCULAR; INTRAVENOUS at 18:39

## 2023-11-01 RX ADMIN — HYDROMORPHONE HYDROCHLORIDE 4 MG: 2 TABLET ORAL at 18:09

## 2023-11-01 RX ADMIN — HYDROMORPHONE HYDROCHLORIDE 0.2 MG: 0.2 INJECTION, SOLUTION INTRAMUSCULAR; INTRAVENOUS; SUBCUTANEOUS at 13:04

## 2023-11-01 RX ADMIN — SERTRALINE HYDROCHLORIDE 50 MG: 50 TABLET ORAL at 11:26

## 2023-11-01 RX ADMIN — DOCUSATE SODIUM 50 MG AND SENNOSIDES 8.6 MG 1 TABLET: 8.6; 5 TABLET, FILM COATED ORAL at 11:26

## 2023-11-01 RX ADMIN — ONDANSETRON 4 MG: 4 TABLET, ORALLY DISINTEGRATING ORAL at 11:01

## 2023-11-01 RX ADMIN — CISPLATIN 42 MG: 1 INJECTION, SOLUTION INTRAVENOUS at 17:29

## 2023-11-01 RX ADMIN — SODIUM CHLORIDE 500 ML: 9 INJECTION, SOLUTION INTRAVENOUS at 15:30

## 2023-11-01 RX ADMIN — ACETAMINOPHEN 1000 MG: 500 TABLET, FILM COATED ORAL at 22:05

## 2023-11-01 RX ADMIN — FOSAPREPITANT 150 MG: 150 INJECTION, POWDER, LYOPHILIZED, FOR SOLUTION INTRAVENOUS at 14:56

## 2023-11-01 RX ADMIN — ONDANSETRON 16 MG: 8 TABLET ORAL at 14:56

## 2023-11-01 RX ADMIN — DEXAMETHASONE 12 MG: 4 TABLET ORAL at 14:56

## 2023-11-01 RX ADMIN — HYDROMORPHONE HYDROCHLORIDE 2 MG: 2 TABLET ORAL at 09:58

## 2023-11-01 RX ADMIN — ACETAMINOPHEN 1000 MG: 500 TABLET, FILM COATED ORAL at 11:26

## 2023-11-01 RX ADMIN — ACETAMINOPHEN 1000 MG: 500 TABLET, FILM COATED ORAL at 16:05

## 2023-11-01 ASSESSMENT — ACTIVITIES OF DAILY LIVING (ADL)
ADLS_ACUITY_SCORE: 20

## 2023-11-01 NOTE — PROGRESS NOTES
SPIRITUAL HEALTH SERVICES - Progress Note  [facility]    Referral Source: Admission request    I met briefly with Delroy and his family members to introduce myself and [facility]. Delroy requested that a  return tomorrow for a visit.    Plan: Follow-up request communicated to unit .    SERGEY MeltonEd.   Intern    [facility] routine referrals *[phone]  [facility] available 24/7 for emergent requests/referrals, either by paging the on-call  or by entering an ASAP/STAT consult in Epic (this will also page the on-call ).

## 2023-11-01 NOTE — CONSULTS
This consult has been requested by Steph Barrera PA-C for testicular cancer.    Mr. Banegas is a 28-year-old gentleman with newly diagnosed embryonal carcinoma, clinical stage IIIB, [id number], S2 (pre-orchiectomy), intermediate risk.  Patient admitted to the hospital to start first cycle of chemotherapy with BEP regimen on urgent basis.  Patient was seen by Dr. Heather Ambrosio yesterday in oncology clinic.  Please see her note for details.    Patient's main problem is pain.  He has pain in lower abdomen.  He also has pain in the back.  He also has generalized weakness.  No headache.  No dizziness.  No chest pain.  No shortness of breath at rest.  Occasional cough.  Some nausea.  No recurrent vomiting.  Appetite is decreased.  No urinary complaints.  He has some constipation.  No bleeding.  No fever.  All other review of systems negative.    Allergies: Reviewed.    Medications: Reviewed.    Past medical history:  -Alcohol use disorder in remission.  -PTSD.  -Anxiety disorder.  -Depression.    Social history:  -He quit smoking.  -Quit alcohol.    Exam:  He is alert oriented x3.  Not in distress.  Vitals: Reviewed.  Rest of systems not examined.    Labs: Reviewed.  CBC reveals mild anemia.  Otherwise normal.  CMP is normal.    Assessment:  1.  A 28-year-old gentleman with clinical stage IIIB embryonal cell carcinoma admitted for BEP chemotherapy.  2.  Abdominal pain and back pain from malignancy.  3.  Anxiety/depression.    Recommendation:  1.  For testicular cancer, patient will start first cycle of BEP chemotherapy today.  Labs are good for treatment.  Regimen and side effects have been reviewed by his primary oncologist in the clinic.    Patient already had sperm banking done.    2.  Patient has pain from malignancy.  He will be continued on pain medication including Dilaudid.    Side effects of narcotics including constipation reviewed.  Advised the patient to take stool softener every day.    3. Advised him  to drink plenty of water.    4.  He had few questions which were all answered.  Oncology will continue to follow.    Total time spent 45 minutes.  Time spent in today's visit, review of chart/investigations today and documentation today.

## 2023-11-01 NOTE — PHARMACY-ADMISSION MEDICATION HISTORY
Pharmacist Admission Medication History    Admission medication history is complete. The information provided in this note is only as accurate as the sources available at the time of the update.    Information Source(s): Patient, Family member, and CareEverywhere/SureScripts via phone    Pertinent Information: Patient was on Zoloft but has not been taking it, but plan on restarting while admitted.     Changes made to PTA medication list:  Added: None  Deleted: None  Changed: None    Allergies reviewed with patient and updates made in EHR: yes    Medication History Completed By: Penelope Junior RPH 11/1/2023 11:08 AM    PTA Med List   Medication Sig Last Dose    acetaminophen (TYLENOL) 325 MG tablet Take 325-650 mg by mouth every 6 hours as needed for mild pain 11/1/2023 at 650mg at 0500    HYDROmorphone (DILAUDID) 2 MG tablet Take 1 tablet (2 mg) by mouth every 6 hours as needed for severe pain 11/1/2023 at 0400    lidocaine-prilocaine (EMLA) 2.5-2.5 % external cream Apply topically to port site 30 min prior to port access

## 2023-11-01 NOTE — H&P
[facility]    History and Physical - Hospitalist Service       Date of Admission:  11/1/23    Assessment & Plan   Jayson Banegas is a 28 year old male with PMH significant for OBED, MDD, PTSD, alcohol use disorder and nicotine dependence, both in remission, obesity, who was directly admitted at the recommendation of [facility] for chemotherapy induction for recently diagnosed testicular cancer with associated abdominal pain secondary to lymphadenopathy.     Metastatic embryonal carcinoma, recently diagnosed  Recently dx'd testicular cancer, per oncology notes - embryonal carcinoma, clinical stage IIIB, [id number], S2, intermediate risk, probable metastases to bilateral lungs, diffuse abdominal/pelvic/retroperitoneal lymphadenopathy. Plan for fertility preservation, sperm banking done. Saw Urology, and no indication for orchiectomy as no lesions seen on scrotal ultrasound. Port in place. Abd pain as below related lymphadenopathy, on oral dilaudid PTA. Plan for induction chemotherapy per oncology notes, 5 days of treatment with BEP (bleomycin, etoposide, cisplatin) x4 cycles.  -Inpatient admission for induction chemotherapy, oncology unit  -[facility] consultation for chemotherapy and associated orders  -Supportive care  -Port cares  -Obtain basic blood work CMP, CBC, Magnesium; daily BMP, CBC or per oncology    Abdominal pain secondary to lymphadenopathy  Presented to PCP with LLQ abd pain 9/2023, workup ultimately revealed diffuse hypermetabolic pulmonary nodules, and lymphadenopathy throughout retroperitoneum, mesentery, bilateral iliac chains, L>R, as well as in Lt groin region. On admission pain remains 6-7/10 despite PO Dilaudid.  -Pain control - scheduled APAP, PO dilaudid 2-4mg Q 4 hours PRN, IV dilaudid 0.2-0.4mg Q 2 hours PRN  -Ice/Warm packs PRN if helpful  -Consider palliative care consultation if pain is difficult to control, could consider longer acting  "pain regimen if needed     Generalized anxiety disorder  Moderate episode of recurrent major depressive disorder  PTSD  Stopped PTA sertraline 50mg/d ~1 month ago as he did not want to take to many pills knowing he was going to start chemotherapy. On admission, d/w patient and parents, he is agreeable to restarting this medication.  -Restart sertraline 50mg/d, if interacts with oncology medications, ok to put on hold, defer to oncology  -PRN PO ativan for anxiety    Alcohol use disorder, in remission    Nicotine dependence, in remission        Diet: Combination Diet Regular Diet Adult  DVT Prophylaxis: Ambulate every shift  Sanders Catheter: Not present  Lines: PRESENT      Port a Cath 10/27/23 Right Chest wall-Site Assessment: WDL      Cardiac Monitoring: None  Code Status: Full Code    Clinically Significant Risk Factors Present on Admission                       # Overweight: Estimated body mass index is 29.63 kg/m  as calculated from the following:    Height as of this encounter: 1.753 m (5' 9\").    Weight as of this encounter: 91 kg (200 lb 9.9 oz).              Disposition Plan      Expected Discharge Date: 11/03/2023                The patient's care was discussed with the Attending Physician, Dr. Lester, Bedside Nurse, Patient, and Patient's Family.    Steph Barrera PA-C  Hospitalist Service  [facility]  Securely message with Tagent (more info)  Text page via [employer] Paging/Directory     ______________________________________________________________________    Chief Complaint   Abdominal pain    History is obtained from the patient, electronic health record, and patient's parents    History of Present Illness   Jayson Banegas is a 28 year old male with PMH significant for OBED, MDD, PTSD, alcohol use disorder and nicotine dependence, both in remission, obesity, who was directly admitted at the [facility] of [facility] for chemotherapy induction for recently " diagnosed testicular cancer with associated abdominal pain secondary to lymphadenopathy. See note by Dr. Heather Wick dated 10/31/23 for details related to recent diagnosis. Notably, Delroy has been having a lot of LLQ pain, felt to be related to lymphadenopathy. He is taking APAP and 2mg tablet oral hydromorphone for this pain. On arrival to [facility], the patient confirms the history above. He has stopped his sertraline 50mg/d ~1 month ago as he didn't want to take too many medications. He does feel he should restart this medication and is agreeable to doing so now unless contraindications from oncology medications. He has been taking PO hydromorphone 2mg Q4 hours at home, on admission he has recently had a dose and he still appears uncomfortable, rating pain 6-7/10. I have asked the nurses to provide IV Dilaudid for additional pain relief.       Past Medical History    Past Medical History:   Diagnosis Date    Alcohol use disorder in remission     Generalized anxiety disorder     MDD (major depressive disorder)     Obesity     PTSD (post-traumatic stress disorder)     Testicular cancer (H)     diagnosed 10/2023    Tobacco use disorder     in remission       Past Surgical History   Past Surgical History:   Procedure Laterality Date    IR CHEST PORT PLACEMENT > 5 YRS OF AGE  10/27/2023       Prior to Admission Medications   Prior to Admission Medications   Prescriptions Last Dose Informant Patient Reported? Taking?   HYDROmorphone (DILAUDID) 2 MG tablet 11/1/2023 at 0400 Self, Mother No Yes   Sig: Take 1 tablet (2 mg) by mouth every 6 hours as needed for severe pain   acetaminophen (TYLENOL) 325 MG tablet 11/1/2023 at 650mg at 0500 Self, Mother Yes Yes   Sig: Take 325-650 mg by mouth every 6 hours as needed for mild pain   lidocaine-prilocaine (EMLA) 2.5-2.5 % external cream  Self, Mother No Yes   Sig: Apply topically to port site 30 min prior to port access      Facility-Administered Medications: None        Review  of Systems    The 10 point Review of Systems is negative other than noted in the HPI or here.     Social History   I have reviewed this patient's social history and updated it with pertinent information if needed.  Social History     Tobacco Use    Smoking status: Former     Types: Cigarettes     Passive exposure: Never    Smokeless tobacco: Never         Allergies   Allergies   Allergen Reactions    Sulfamethoxazole-Trimethoprim Rash        Physical Exam   Vital Signs: Temp: 97.9  F (36.6  C) Temp src: Oral BP: 125/73 Pulse: 70   Resp: 16 SpO2: 94 % O2 Device: None (Room air)    Weight: 200 lbs 9.9 oz    Physical Exam    General: Awake, alert, very pleasant young man who appears stated age. Looks uncomfortable laying in bed, endorsing abdominal pain, able to answer questions.   HEENT: Normocephalic, atraumatic. Extraocular movements intact.   Respiratory: Clear to auscultation bilaterally, no rales, wheezing, or rhonchi.  Cardiovascular: Regular rate and rhythm, +S1 and S2, no murmur auscultated. No peripheral edema. Port to right upper chest, no erythema, or drainage; dressing intact.  Gastrointestinal: Distended but no rigidity or guarding. Diffuse abdominal pain, not localizing to any particular area.   Skin: Warm, dry. No obvious rashes or lesions on exposed skin. Dorsalis pedis pulses palpable bilaterally.  Musculoskeletal: No joint swelling, erythema or tenderness. Moves all extremities equally.  Neurologic: AAO x3. Cranial nerves 2-12 grossly intact, normal strength and sensation.  Psychiatric: Appropriate mood and affect. No obvious anxiety or depression.      Medical Decision Making       60 MINUTES SPENT BY ME on the date of service doing chart review, history, exam, documentation & further activities per the note.      Data     I have personally reviewed the following data over the past 24 hrs:    5.3  \   13.2 (L)   / 326     N/A N/A N/A /  N/A   N/A N/A N/A \       Imaging results reviewed over the  past 24 hrs:   No results found for this or any previous visit (from the past 24 hour(s)).

## 2023-11-01 NOTE — PLAN OF CARE
Orientation: A&Ox4  Activity: Ind  Diet/BS Checks: Regular  Tele:  N/a  IV Access/Drains: Port infusing NS + 20 KCl + mag sulfate  Pain Management: c/o of 7 out of 10 prn oral dilaudid 4 mg  Abnormal VS/Results: VSS on RA  Bowel/Bladder: Continent   Skin/Wounds: N/a  Consults: onc  D/C Disposition: after chemo completion  Other Info:

## 2023-11-02 ENCOUNTER — TELEPHONE (OUTPATIENT)
Dept: ONCOLOGY | Facility: CLINIC | Age: 28
End: 2023-11-02
Payer: COMMERCIAL

## 2023-11-02 LAB
ANION GAP SERPL CALCULATED.3IONS-SCNC: 14 MMOL/L (ref 7–15)
BUN SERPL-MCNC: 6.7 MG/DL (ref 6–20)
CALCIUM SERPL-MCNC: 8.9 MG/DL (ref 8.6–10)
CHLORIDE SERPL-SCNC: 100 MMOL/L (ref 98–107)
CREAT SERPL-MCNC: 0.57 MG/DL (ref 0.67–1.17)
DEPRECATED HCO3 PLAS-SCNC: 21 MMOL/L (ref 22–29)
EGFRCR SERPLBLD CKD-EPI 2021: >90 ML/MIN/1.73M2
ERYTHROCYTE [DISTWIDTH] IN BLOOD BY AUTOMATED COUNT: 12.3 % (ref 10–15)
GLUCOSE SERPL-MCNC: 101 MG/DL (ref 70–99)
HCT VFR BLD AUTO: 40.7 % (ref 40–53)
HGB BLD-MCNC: 13.6 G/DL (ref 13.3–17.7)
MAGNESIUM SERPL-MCNC: 2.4 MG/DL (ref 1.7–2.3)
MCH RBC QN AUTO: 28.6 PG (ref 26.5–33)
MCHC RBC AUTO-ENTMCNC: 33.4 G/DL (ref 31.5–36.5)
MCV RBC AUTO: 86 FL (ref 78–100)
PLATELET # BLD AUTO: 338 10E3/UL (ref 150–450)
POTASSIUM SERPL-SCNC: 4.4 MMOL/L (ref 3.4–5.3)
RBC # BLD AUTO: 4.75 10E6/UL (ref 4.4–5.9)
SODIUM SERPL-SCNC: 135 MMOL/L (ref 135–145)
WBC # BLD AUTO: 4.1 10E3/UL (ref 4–11)

## 2023-11-02 PROCEDURE — 80048 BASIC METABOLIC PNL TOTAL CA: CPT | Performed by: PHYSICIAN ASSISTANT

## 2023-11-02 PROCEDURE — 99232 SBSQ HOSP IP/OBS MODERATE 35: CPT | Performed by: NURSE PRACTITIONER

## 2023-11-02 PROCEDURE — 250N000013 HC RX MED GY IP 250 OP 250 PS 637: Performed by: PHYSICIAN ASSISTANT

## 2023-11-02 PROCEDURE — 258N000003 HC RX IP 258 OP 636: Performed by: INTERNAL MEDICINE

## 2023-11-02 PROCEDURE — 83735 ASSAY OF MAGNESIUM: CPT | Performed by: PHYSICIAN ASSISTANT

## 2023-11-02 PROCEDURE — 250N000011 HC RX IP 250 OP 636: Performed by: INTERNAL MEDICINE

## 2023-11-02 PROCEDURE — 250N000011 HC RX IP 250 OP 636: Mod: JZ | Performed by: PHYSICIAN ASSISTANT

## 2023-11-02 PROCEDURE — 85027 COMPLETE CBC AUTOMATED: CPT | Performed by: PHYSICIAN ASSISTANT

## 2023-11-02 PROCEDURE — 250N000013 HC RX MED GY IP 250 OP 250 PS 637: Performed by: INTERNAL MEDICINE

## 2023-11-02 PROCEDURE — 99232 SBSQ HOSP IP/OBS MODERATE 35: CPT | Performed by: INTERNAL MEDICINE

## 2023-11-02 PROCEDURE — 120N000001 HC R&B MED SURG/OB

## 2023-11-02 PROCEDURE — 250N000012 HC RX MED GY IP 250 OP 636 PS 637: Performed by: INTERNAL MEDICINE

## 2023-11-02 RX ORDER — ONDANSETRON 2 MG/ML
4 INJECTION INTRAMUSCULAR; INTRAVENOUS EVERY 6 HOURS PRN
Status: DISCONTINUED | OUTPATIENT
Start: 2023-11-06 | End: 2023-11-05 | Stop reason: HOSPADM

## 2023-11-02 RX ORDER — POLYETHYLENE GLYCOL 3350 17 G/17G
17 POWDER, FOR SOLUTION ORAL DAILY PRN
Status: DISCONTINUED | OUTPATIENT
Start: 2023-11-02 | End: 2023-11-03

## 2023-11-02 RX ORDER — POLYETHYLENE GLYCOL 3350 17 G/17G
17 POWDER, FOR SOLUTION ORAL DAILY
Status: DISCONTINUED | OUTPATIENT
Start: 2023-11-02 | End: 2023-11-02

## 2023-11-02 RX ORDER — ONDANSETRON 4 MG/1
4 TABLET, ORALLY DISINTEGRATING ORAL EVERY 6 HOURS PRN
Status: DISCONTINUED | OUTPATIENT
Start: 2023-11-06 | End: 2023-11-05 | Stop reason: HOSPADM

## 2023-11-02 RX ADMIN — ACETAMINOPHEN 1000 MG: 500 TABLET, FILM COATED ORAL at 09:19

## 2023-11-02 RX ADMIN — HYDROMORPHONE HYDROCHLORIDE 4 MG: 2 TABLET ORAL at 10:17

## 2023-11-02 RX ADMIN — ACETAMINOPHEN 1000 MG: 500 TABLET, FILM COATED ORAL at 17:12

## 2023-11-02 RX ADMIN — HYDROMORPHONE HYDROCHLORIDE 0.2 MG: 0.2 INJECTION, SOLUTION INTRAMUSCULAR; INTRAVENOUS; SUBCUTANEOUS at 17:54

## 2023-11-02 RX ADMIN — ONDANSETRON 16 MG: 8 TABLET ORAL at 14:20

## 2023-11-02 RX ADMIN — HYDROMORPHONE HYDROCHLORIDE 4 MG: 2 TABLET ORAL at 20:09

## 2023-11-02 RX ADMIN — CISPLATIN 42 MG: 1 INJECTION, SOLUTION INTRAVENOUS at 17:09

## 2023-11-02 RX ADMIN — HYDROMORPHONE HYDROCHLORIDE 4 MG: 2 TABLET ORAL at 14:20

## 2023-11-02 RX ADMIN — ETOPOSIDE 210 MG: 20 INJECTION INTRAVENOUS at 15:16

## 2023-11-02 RX ADMIN — PROCHLORPERAZINE MALEATE 10 MG: 10 TABLET ORAL at 17:19

## 2023-11-02 RX ADMIN — HYDROMORPHONE HYDROCHLORIDE 0.2 MG: 0.2 INJECTION, SOLUTION INTRAMUSCULAR; INTRAVENOUS; SUBCUTANEOUS at 18:44

## 2023-11-02 RX ADMIN — MAGNESIUM SULFATE HEPTAHYDRATE: 500 INJECTION, SOLUTION INTRAMUSCULAR; INTRAVENOUS at 10:20

## 2023-11-02 RX ADMIN — DEXAMETHASONE 12 MG: 4 TABLET ORAL at 14:20

## 2023-11-02 RX ADMIN — DOCUSATE SODIUM 50 MG AND SENNOSIDES 8.6 MG 1 TABLET: 8.6; 5 TABLET, FILM COATED ORAL at 09:19

## 2023-11-02 RX ADMIN — ONDANSETRON 4 MG: 4 TABLET, ORALLY DISINTEGRATING ORAL at 04:53

## 2023-11-02 RX ADMIN — ACETAMINOPHEN 1000 MG: 500 TABLET, FILM COATED ORAL at 21:42

## 2023-11-02 RX ADMIN — HYDROMORPHONE HYDROCHLORIDE 4 MG: 2 TABLET ORAL at 02:07

## 2023-11-02 RX ADMIN — SERTRALINE HYDROCHLORIDE 50 MG: 50 TABLET ORAL at 09:19

## 2023-11-02 RX ADMIN — MAGNESIUM SULFATE HEPTAHYDRATE: 500 INJECTION, SOLUTION INTRAMUSCULAR; INTRAVENOUS at 02:20

## 2023-11-02 RX ADMIN — POLYETHYLENE GLYCOL 3350 17 G: 17 POWDER, FOR SOLUTION ORAL at 17:53

## 2023-11-02 RX ADMIN — MAGNESIUM SULFATE HEPTAHYDRATE: 500 INJECTION, SOLUTION INTRAMUSCULAR; INTRAVENOUS at 20:01

## 2023-11-02 RX ADMIN — HYDROMORPHONE HYDROCHLORIDE 4 MG: 2 TABLET ORAL at 06:21

## 2023-11-02 ASSESSMENT — ACTIVITIES OF DAILY LIVING (ADL)
ADLS_ACUITY_SCORE: 20
DEPENDENT_IADLS:: INDEPENDENT
ADLS_ACUITY_SCORE: 20

## 2023-11-02 NOTE — TELEPHONE ENCOUNTER
Received a call from Danisha in the lab at [facility] in [place], [place]. [phone]   States that they faxed a pathology consult letter to the clinic on 10/30/2023.  They are still waiting for a response, and states that the delay is holding up the case at [facility].  Danisha will re-fax the letter again today to [phone].  Will route to care team for follow up.    Connie Molina RN on 11/2/2023 at 3:30 PM

## 2023-11-02 NOTE — CONSULTS
Care Management Initial Consult    General Information  Assessment completed with: Patient, Parents, Delroy and mother Deb  Type of CM/SW Visit: Initial Assessment    Primary Care Provider verified and updated as needed: Yes   Readmission within the last 30 days: no previous admission in last 30 days      Reason for Consult: discharge planning  Advance Care Planning:            Communication Assessment  Patient's communication style: spoken language (English or Bilingual)    Hearing Difficulty or Deaf: no   Wear Glasses or Blind: yes    Cognitive  Cognitive/Neuro/Behavioral: WDL                      Living Environment:   People in home: significant other     Current living Arrangements: house      Able to return to prior arrangements: yes       Family/Social Support:  Care provided by: self  Provides care for: no one  Marital Status: Lives with Significant Other  Parent(s), Significant Other          Description of Support System: Supportive, Involved         Current Resources:   Patient receiving home care services: No     Community Resources: None  Equipment currently used at home: none  Supplies currently used at home: None    Employment/Financial:  Employment Status: employed full-time, other (see comments) (on FLMA during chemotherapy tx)        Financial Concerns:             Does the patient's insurance plan have a 3 day qualifying hospital stay waiver?  No    Lifestyle & Psychosocial Needs:  Social Determinants of Health     Food Insecurity: Not on file   Depression: Not at risk (10/24/2023)    PHQ-2     PHQ-2 Score: 0   Housing Stability: Not on file   Tobacco Use: Medium Risk (11/1/2023)    Patient History     Smoking Tobacco Use: Former     Smokeless Tobacco Use: Never     Passive Exposure: Never   Financial Resource Strain: Not on file   Alcohol Use: Not on file   Transportation Needs: Not on file   Physical Activity: Not on file   Interpersonal Safety: Not on file   Stress: Not on file   Social  Connections: Not on file       Functional Status:  Prior to admission patient needed assistance:   Dependent ADLs:: Independent  Dependent IADLs:: Independent       Mental Health Status:          Chemical Dependency Status:                Values/Beliefs:  Spiritual, Cultural Beliefs, Jain Practices, Values that affect care: yes (Pentecostalism)  Description of Beliefs that Will Affect Care: Anabaptist            Additional Information:  Per consult for discharge planning, met with patient and pt's parents Deb and Edward.  Per patient, he was independent with his ADLs & IADLs and had been staying most recently with his parents that live 5 miles from his home that he shared with his S.O.  Patient shared that he is on an FLMA during his chemotherapy txs and currently doesn't have any financial concerns.  Discussed new cancer dx and offered resources.  Deb shared that she would like to meet with the clinic SW to discuss resources for patient.  Informed Deb, that Antonia BOYD is the Clinical SW for [facility] will be sent a message to arrange a clinic visit with pt's next follow up with Dr. Wick.  Provided patient with an [facility] referral form to complete.  Discussed that his Oncology follow-up appts will be scheduled by [facility].     Inpatient Care Coordinator will continue to follow for discharge planning.   MAX Riggs RN, BSN, OCN   Inpatient Care Coordination [address]  Office: [phone]

## 2023-11-02 NOTE — PLAN OF CARE
Orientation: A/Ox4  Activity: IND, ambulated in halls in room  Diet/BS Checks: Reg, poor appetite, ensure ordered  Tele:    IV Access/Drains: Port infusing NS+KCL+Mag @ 125ml/hr with int chemo  Pain Management: C/o 9/10 pain to lower abd and flank, PO dilaudid no relief, IV dilaudid .4mg given x1 with some relief. On scheduled Tylenol  Abnormal VS/Results:   Bowel/Bladder: C/o feeling of constipation associated with pain PRN miralax given, last BM 11/1. C/o nausea, PRN compazine given with some relief  Skin/Wounds:   Consults: Hem/onc, nutrition consult for poor appetite. Pain consult placed for uncontrolled pain  D/C Disposition: Discharge to home Sunday 11/5 upon completion of chemo  Other Info:   C1D2 chemo administered, Etoposide and Cisplatin, pt tolerated well

## 2023-11-02 NOTE — PROGRESS NOTES
"SPIRITUAL HEALTH SERVICES - Progress Note    Oncology  Saw pt Jayson Banegas and both of his parents per admission request.     Patient/Family Understanding of Illness and Goals of Care - Delroy has \"just started\" his journey with cancer and knows he has \"a long road\" ahead.     Distress and Loss -   Delroy said he is \"in a lot of pain\" today and the medical staff are \"aware and helpful.\"  Delroy shared that he has some \"regrets\" and is looking for \"forgiveness.\"  Emotionally, Delroy stated that he feels a \"loss of interest, not because [I] want to,\" but because of his medical condition and the \"limits\" that puts upon his ability to work and do other activities he enjoys.     Strengths, Coping, and Resources -   Delroy's parents were with him in the room. He also named that additional family members \"have been checking in\" and that his \"girlfriend is always there\" for him.  Delroy has been using the \"Calm\" arslan on his phone.     Meaning, Beliefs, and Spirituality -   Delroy identifies as \"Restorationist\" and the sacraments are important to him.  He says he's \"trying to pray two times a day to get closer to God.\"  Delroy also said that sometimes he feels \"alone in this illness.\"   I was able to remind Delroy of God's promises found in scripture that \"nothing will separate us\" from God's presence and love.    Plan of Care - Spiritual Health will continue to follow this patient during his stay to further discuss his emotions and spiritual concerns. A Eucharistic  will be in the building tomorrow and will be asked to check in as well.       Karla Quezada  Chaplain Resident    [facility] routine referrals *[phone]    [facility] available 24/7 for emergent requests/referrals, either by paging the on-call  or by entering an ASAP/STAT consult in Epic (this will also page the on-call ).   "

## 2023-11-02 NOTE — PLAN OF CARE
Summary: First round of BEP chemo  Primary Diagnosis: Testicular cancer    Orientation: AO x4   Activity: Independent  Diet/BS Checks: Regular Diet No BG checks  Tele:  NA  IV Access/Drains: Right chest wall Port  Pain Management: Generalized abdominal and flank pain. Oral Dilaudid PRN q4  Abnormal VS/Results: VSS on RA  Bowel/Bladder: Reported Nausea PRN zofran given  Skin/Wounds: WDL  Consults: Hematology-Oncology, & SW following  Other info: Potassium & Magnesium protocols  D/C Disposition: Upon completion of chemotherapy on Sunday.

## 2023-11-02 NOTE — PROGRESS NOTES
"Hematology-Oncology Follow-up Note  [facility]     Today's Date: 11/02/23  Date of Admission:  11/1/2023  Reason for Consult: Newly diagnosed testicular cancer  Clinical stage IIIb embryonal cell carcinoma admitted for BEP chemotherapy          ASSESSMENT/ PLAN :      Testicular cancer  Follows with Dr. Wick  Patient admitted for the start of BEP chemotherapy  Continue with chemo  On discharge we need to  Arrange for bleomycin days 1, 8, 15; etoposide days 1-5, cisplatin days 1-5 with day 1 on 11/27/2023.  Lab draw weekly.  RTC NP 11/27/2023 with lab draw.  RTC MD on 12/18 at 11:20am (add-on) with lab draw and CT chest/abdomen/pelvis prior to visit.  Arrange for bleomycin days 1, 8, 15; etoposide days 1-5, cisplatin days 1-5 with day 1 on 12/18/2023.  I will send message to our schedulers and 's  nurse to work on scheduling appointments above        Cancer pain  Abdominal pain and back pain from malignancy  Currently on Dilaudid 4 mg p.o. 4 to 6 hours.  His pain is not under control.  We need better control of his pain  I recommend palliative care or pain medicine for management of his pain-I notified charge nurse  We will notify the hospitalist today        We will continue to see the patient      INTERIM HISTORY:       MEDICATIONS:  Reviewed         PHYSICAL EXAM:  Vital signs:  Temp: 97.1  F (36.2  C) Temp src: Oral BP: 119/73 Pulse: 65   Resp: 16 SpO2: 98 % O2 Device: None (Room air)   Height: 175.3 cm (5' 9\") Weight: 91 kg (200 lb 9.9 oz)  Estimated body mass index is 29.63 kg/m  as calculated from the following:    Height as of this encounter: 1.753 m (5' 9\").    Weight as of this encounter: 91 kg (200 lb 9.9 oz).      GENERAL/CONSTITUTIONAL: No acute distress.      LABS:  Recent Labs   Lab Test 11/02/23  0623      POTASSIUM 4.4   CHLORIDE 100   CO2 21*   ANIONGAP 14   BUN 6.7   CR 0.57*   *   ALEX 8.9     Recent Labs   Lab Test 11/02/23  0623 11/01/23  1105   WBC " 4.1 5.3   HGB 13.6 13.2*    326   MCV 86 86   NEUTROPHIL  --  66     Recent Labs   Lab Test 11/01/23  1041   BILITOTAL 0.3   ALKPHOS 84   ALT 23   AST 35   ALBUMIN 4.0             Preston Wiseman Nurse Practitioner   [facility]   [phone]    Chart documentation with Dragon Voice recognition Software. Although reviewed after completion, some words and grammatical errors may remain.

## 2023-11-03 DIAGNOSIS — C80.1 EMBRYONAL CARCINOMA (H): Primary | ICD-10-CM

## 2023-11-03 LAB
MAGNESIUM SERPL-MCNC: 2.6 MG/DL (ref 1.7–2.3)
POTASSIUM SERPL-SCNC: 4.9 MMOL/L (ref 3.4–5.3)

## 2023-11-03 PROCEDURE — 250N000013 HC RX MED GY IP 250 OP 250 PS 637: Performed by: NURSE PRACTITIONER

## 2023-11-03 PROCEDURE — 250N000011 HC RX IP 250 OP 636: Performed by: INTERNAL MEDICINE

## 2023-11-03 PROCEDURE — 250N000011 HC RX IP 250 OP 636: Mod: JZ | Performed by: PHYSICIAN ASSISTANT

## 2023-11-03 PROCEDURE — 99223 1ST HOSP IP/OBS HIGH 75: CPT | Performed by: NURSE PRACTITIONER

## 2023-11-03 PROCEDURE — 258N000003 HC RX IP 258 OP 636: Performed by: INTERNAL MEDICINE

## 2023-11-03 PROCEDURE — 84132 ASSAY OF SERUM POTASSIUM: CPT | Performed by: INTERNAL MEDICINE

## 2023-11-03 PROCEDURE — 250N000013 HC RX MED GY IP 250 OP 250 PS 637: Performed by: INTERNAL MEDICINE

## 2023-11-03 PROCEDURE — 99232 SBSQ HOSP IP/OBS MODERATE 35: CPT | Performed by: NURSE PRACTITIONER

## 2023-11-03 PROCEDURE — 250N000012 HC RX MED GY IP 250 OP 636 PS 637: Performed by: INTERNAL MEDICINE

## 2023-11-03 PROCEDURE — 120N000001 HC R&B MED SURG/OB

## 2023-11-03 PROCEDURE — 99232 SBSQ HOSP IP/OBS MODERATE 35: CPT | Performed by: INTERNAL MEDICINE

## 2023-11-03 PROCEDURE — 83735 ASSAY OF MAGNESIUM: CPT | Performed by: INTERNAL MEDICINE

## 2023-11-03 PROCEDURE — 250N000013 HC RX MED GY IP 250 OP 250 PS 637: Performed by: PHYSICIAN ASSISTANT

## 2023-11-03 RX ORDER — POLYETHYLENE GLYCOL 3350 17 G/17G
17 POWDER, FOR SOLUTION ORAL DAILY
Status: DISCONTINUED | OUTPATIENT
Start: 2023-11-04 | End: 2023-11-05 | Stop reason: HOSPADM

## 2023-11-03 RX ORDER — BISACODYL 10 MG
10 SUPPOSITORY, RECTAL RECTAL ONCE
Status: COMPLETED | OUTPATIENT
Start: 2023-11-04 | End: 2023-11-04

## 2023-11-03 RX ORDER — AMOXICILLIN 250 MG
3-4 CAPSULE ORAL 2 TIMES DAILY
Status: DISCONTINUED | OUTPATIENT
Start: 2023-11-03 | End: 2023-11-05 | Stop reason: HOSPADM

## 2023-11-03 RX ORDER — MORPHINE SULFATE 15 MG/1
15 TABLET, FILM COATED, EXTENDED RELEASE ORAL EVERY 12 HOURS SCHEDULED
Status: DISCONTINUED | OUTPATIENT
Start: 2023-11-03 | End: 2023-11-05 | Stop reason: HOSPADM

## 2023-11-03 RX ADMIN — ETOPOSIDE 210 MG: 20 INJECTION INTRAVENOUS at 14:54

## 2023-11-03 RX ADMIN — PROCHLORPERAZINE MALEATE 10 MG: 10 TABLET ORAL at 16:24

## 2023-11-03 RX ADMIN — SERTRALINE HYDROCHLORIDE 50 MG: 50 TABLET ORAL at 08:01

## 2023-11-03 RX ADMIN — DOCUSATE SODIUM 50 MG AND SENNOSIDES 8.6 MG 4 TABLET: 8.6; 5 TABLET, FILM COATED ORAL at 20:19

## 2023-11-03 RX ADMIN — HYDROMORPHONE HYDROCHLORIDE 0.2 MG: 0.2 INJECTION, SOLUTION INTRAMUSCULAR; INTRAVENOUS; SUBCUTANEOUS at 16:16

## 2023-11-03 RX ADMIN — ACETAMINOPHEN 1000 MG: 500 TABLET, FILM COATED ORAL at 16:17

## 2023-11-03 RX ADMIN — DOCUSATE SODIUM 50 MG AND SENNOSIDES 8.6 MG 2 TABLET: 8.6; 5 TABLET, FILM COATED ORAL at 08:01

## 2023-11-03 RX ADMIN — MAGNESIUM SULFATE HEPTAHYDRATE: 500 INJECTION, SOLUTION INTRAMUSCULAR; INTRAVENOUS at 03:21

## 2023-11-03 RX ADMIN — HYDROMORPHONE HYDROCHLORIDE 4 MG: 2 TABLET ORAL at 07:34

## 2023-11-03 RX ADMIN — ACETAMINOPHEN 1000 MG: 500 TABLET, FILM COATED ORAL at 08:01

## 2023-11-03 RX ADMIN — MORPHINE SULFATE 15 MG: 15 TABLET, EXTENDED RELEASE ORAL at 13:31

## 2023-11-03 RX ADMIN — ACETAMINOPHEN 1000 MG: 500 TABLET, FILM COATED ORAL at 22:25

## 2023-11-03 RX ADMIN — ONDANSETRON 16 MG: 8 TABLET ORAL at 13:35

## 2023-11-03 RX ADMIN — MAGNESIUM SULFATE HEPTAHYDRATE: 500 INJECTION, SOLUTION INTRAMUSCULAR; INTRAVENOUS at 11:24

## 2023-11-03 RX ADMIN — MAGNESIUM SULFATE HEPTAHYDRATE: 500 INJECTION, SOLUTION INTRAMUSCULAR; INTRAVENOUS at 22:20

## 2023-11-03 RX ADMIN — HYDROMORPHONE HYDROCHLORIDE 0.2 MG: 0.2 INJECTION, SOLUTION INTRAMUSCULAR; INTRAVENOUS; SUBCUTANEOUS at 22:30

## 2023-11-03 RX ADMIN — POLYETHYLENE GLYCOL 3350 17 G: 17 POWDER, FOR SOLUTION ORAL at 08:01

## 2023-11-03 RX ADMIN — DEXAMETHASONE 12 MG: 4 TABLET ORAL at 13:35

## 2023-11-03 RX ADMIN — CISPLATIN 42 MG: 1 INJECTION, SOLUTION INTRAVENOUS at 16:17

## 2023-11-03 RX ADMIN — PROCHLORPERAZINE MALEATE 10 MG: 10 TABLET ORAL at 07:34

## 2023-11-03 RX ADMIN — MORPHINE SULFATE 15 MG: 15 TABLET, EXTENDED RELEASE ORAL at 22:25

## 2023-11-03 RX ADMIN — HYDROMORPHONE HYDROCHLORIDE 4 MG: 2 TABLET ORAL at 11:20

## 2023-11-03 RX ADMIN — HYDROMORPHONE HYDROCHLORIDE 4 MG: 2 TABLET ORAL at 03:13

## 2023-11-03 RX ADMIN — LORAZEPAM 0.5 MG: 2 INJECTION INTRAMUSCULAR; INTRAVENOUS at 12:31

## 2023-11-03 ASSESSMENT — ACTIVITIES OF DAILY LIVING (ADL)
ADLS_ACUITY_SCORE: 20

## 2023-11-03 NOTE — PROGRESS NOTES
"Hematology-Oncology Follow-up Note  [facility]     Today's Date: 11/03/23  Date of Admission:  11/1/2023  Reason for Consult: Newly diagnosed testicular cancer  Clinical stage IIIb embryonal cell carcinoma admitted for BEP chemotherapy          ASSESSMENT/ PLAN :      Testicular cancer  Follows with Dr. Wick  Patient admitted for the start of BEP chemotherapy  Continue with chemo  On discharge we need to  Arrange for bleomycin days 1, 8, 15; etoposide days 1-5, cisplatin days 1-5 with day 1 on 11/27/2023.  Lab draw weekly.  RTC NP 11/27/2023 with lab draw.  RTC [place] on 12/18 at 11:20am with lab draw and CT chest/abdomen/pelvis prior to visit.  Arrange for bleomycin days 1, 8, 15; etoposide days 1-5, cisplatin days 1-5 with day 1 on 12/18/2023.  I will send message to our schedulers and 's  nurse to work on scheduling appointments above  I will see patient next week with bleomycin        Cancer pain  Abdominal pain and back pain from malignancy  Palliative care following  Continue with Dilaudid  Patient to start MS Contin 15 mg p.o. twice daily         We will continue to see the patient      INTERIM HISTORY:  Patient resting in bed     MEDICATIONS:  Reviewed         PHYSICAL EXAM:  Vital signs:  Temp: 97.7  F (36.5  C) Temp src: Oral BP: 136/73 Pulse: 79   Resp: 18 SpO2: 95 % O2 Device: None (Room air)   Height: 175.3 cm (5' 9\") Weight: 91 kg (200 lb 9.9 oz)  Estimated body mass index is 29.63 kg/m  as calculated from the following:    Height as of this encounter: 1.753 m (5' 9\").    Weight as of this encounter: 91 kg (200 lb 9.9 oz).      GENERAL/CONSTITUTIONAL: No acute distress.      LABS:  Recent Labs   Lab Test 11/02/23  0623      POTASSIUM 4.4   CHLORIDE 100   CO2 21*   ANIONGAP 14   BUN 6.7   CR 0.57*   *   ALEX 8.9     Recent Labs   Lab Test 11/02/23  0623 11/01/23  1105   WBC 4.1 5.3   HGB 13.6 13.2*    326   MCV 86 86   NEUTROPHIL  --  66     Recent Labs "   Lab Test 11/01/23  1041   BILITOTAL 0.3   ALKPHOS 84   ALT 23   AST 35   ALBUMIN 4.0             Preston Wiseman Nurse Practitioner   [facility]   [phone]    Chart documentation with Dragon Voice recognition Software. Although reviewed after completion, some words and grammatical errors may remain.

## 2023-11-03 NOTE — CONSULTS
Palliative Care Consultation Note  [facility]      Patient: Jayson Banegas  Date of Admission:  11/1/2023    Requesting Clinician / Team: Dr. Lester/hospitalist  Reason for consult: Pain management, Symptom management     Recommendations & Counseling     GOALS OF CARE:  Restorative without limits -Delroy has been initiated on a cancer treatment regimen of BEP (bleomycin, etoposide, cisplatin) x4 cycles.  He is currently inpatient for induction chemotherapy    ADVANCE CARE PLANNING:  No health care directive on file. Per  informed consent policy, next of kin should be involved if patient becomes unable.  There is no POLST form on file, defer to patient and/or next of kin for decisions   Advanced care planning can be further explored in the clinic setting  Code status: Full Code    MEDICAL MANAGEMENT:  #Malignant pain.  Located to left upper quadrant, low pelvis, related to lymphadenopathy/malignancy.  Also with some referred pain to his back.  88 mg OME used in the last 24 hours.  Will benefit from a long-acting opioid.  Will initiate MS Contin 15 mg p.o. twice daily  Continue Dilaudid 2 to 4 mg p.o. every 4 hours as needed pain  With initiation of chemotherapy and ideally gaining control of his disease burden, suspect that his pain and symptom burden will improve over time.  There may be a role to taper these opioids in the future  Continue APAP 1 g p.o. 3 times daily  I have recommended a referral to our outpatient palliative clinic, he and family agreeable.  He lives a couple hours away, so virtual visits will work out well for him.  Referral sent  Delroy's father mentions use of THC Gummies.  Did inform them that Delroy can talk with his palliative team about medicinal marijuana certification    #Constipation.  Likely opioid induced, but also related to reduced mobility and unfamiliar setting.  Increase senna 3 to 4 tablets p.o. twice daily  MiraLAX daily  If no BM in the next 24  hours, will recommend bisacodyl suppository on Saturday    #Insomnia.  Likely secondary to frequent hospital interruptions, steroids, intractable pain.  Suspect this will improve with improved pain management and eventually returning home this weekend.    PSYCHOSOCIAL/SPIRITUAL SUPPORT:  Family -great support from his mother and father who are both present today.  Also supportive girlfriend who is an RN.  Patient feels close to his 17-year-old brother and his 2 grandmothers.    Palliative Care will continue to follow. Thank you for the consult and allowing us to aid in the care of Jayson Banegas.    These recommendations have been discussed with bedside RN Jennifer, Dr. Lester.    GRACE Corbett CNP  Securely message with [employer] info)  Text page via [facility] Paging/Directory     Palliative Summary/HPI     Jayson Banegas is a 28 year old male with a past medical history significant for OBED, MDD, PTSD, former alcohol use disorder and nicotine dependence both in remission, and obesity, who was directly admitted for chemotherapy induction for recently diagnosed testicular cancer with associated abdominal pain secondary to lymphadenopathy.     Today, the patient was seen for:  Pain and symptom management    Palliative Care Summary:   Met with Delroy, along with mother and father.     I introduced our role as an extra layer of support and how we help patients and families dealing with serious, potentially life-limiting illnesses. I explained the composition of the palliative care team.  Palliative care helps patients and families navigate their care while focusing on the whole person; providing emotional, social and spiritual support  Palliative care often assists with symptom management, information sharing about what to expect from the illness, available treatment options and what effect those options may have on the disease course, and provide effective communication and caring  support.    Prognosis, Goals, & Planning:    Functional Status just prior to this current hospitalization:  Increasing abdominal pain causing debilitation over the last 6 weeks.  3 weeks of that time was spent treating prostatitis.  Delroy feels better knowing that he is on an active treatment plan to get his cancer under control.    Prognosis, Goals, and/or Advance Care Planning:  Delroy has been initiated on a cancer treatment regimen of BEP (bleomycin, etoposide, cisplatin) x4 cycles.  He is currently inpatient for induction chemotherapy    Code Status was addressed today:   No full code    Patient's decision making preferences: with input from medical clinicians and loved ones        Patient has decision-making capacity today for complex decisions:Intact          Coping, Meaning, & Spirituality:   Mood, coping, and/or meaning in the context of serious illness were addressed today: Yes.  Delroy's mood is feeling better in the last couple of days.  He feels relieved that he is currently on an active cancer treatment plan and on a pathway to recovery/improved symptom management.  He is enthusiastic about getting his pain and symptoms under better control.  He is looking forward to Sunday when he may potentially return home and be able to get some fresh air.    Social:   Living situation:lives with his girlfriend who is a RN  Important relationships/caregivers: great support from his mother and father who are both present today.  Also supportive girlfriend who is an RN.  Patient feels close to his 17-year-old brother and his 2 grandmothers.  Occupation:  for [employer], currently on short-term disability  Areas of fulfillment/mishel: Loves being outdoors, fishing, playing with his dog Nestor, spending time with family, [employer] sports fan  Contributing stressors (financial, substance abuse, relationship concerns, etc.) former substance use disorder (alcohol, tobacco) currently in remission.  Did  acknowledge that with a new serious illness and stressor in life, risk for relapse or misuse of opioids potentially.  Patient feels great support from his parents and girlfriend to help with controlling medications and keeping him sober.    Medications:  I have reviewed this patient's medication profile and medications from this hospitalization. Notable medications:  APAP 1 g p.o. 3 times daily  Bisacodyl suppository x1 tomorrow  Bleomycin  Cisplatin  Dexamethasone daily per chemotherapy regimen  Etoposide  MS Contin 15 mg p.o. twice daily  MiraLAX daily  Senna 3 to 4 tablets p.o. twice daily  Zoloft 50 mg p.o. daily  IV fluids  Dilaudid 0.2 to 0.4 mg IV every 2 hours as needed pain  Ativan 0.5 to 1 mg p.o./IV every 6 hours as needed nausea/vomiting    ROS:  Comprehensive ROS is reviewed and is negative except as here & per HPI: N/A    Physical Exam   Vital Signs with Ranges  Temp:  [97.5  F (36.4  C)-98  F (36.7  C)] 97.7  F (36.5  C)  Pulse:  [53-79] 79  Resp:  [18] 18  BP: (127-136)/(73-81) 136/73  SpO2:  [95 %-98 %] 95 %  200 lbs 9.9 oz  CONSTITUTIONAL: Young healthy appearing man seen resting in bed and primarily NAD, although with some developing discomfort and when seen as the conversation progressed, A&Ox3. Calm and cooperative.  Family present  HEENT: NCAT  NECK: Supple  CARDIOVASCULAR: RRR  RESPIRATORY: NL respiratory effort on room air  NEUROLOGIC: Appropriately responsive during interview  PSYCH: Affect engaged, good spirits    Data reviewed:  Recent imaging independently reviewed, my comments on pertinents:   10/2 CT chest with pulmonary nodules    Recent lab data independently reviewed, my comments on pertinents:   Na 135  K 4.9  Creat 0.57  Magnesium 2.6  WBC 4.1  Hgb 13.6  Plt 338  Albumin 4    Medical Decision Making   75 MINUTES SPENT BY ME on the date of service doing chart review, history, exam, documentation & further activities per the note.

## 2023-11-03 NOTE — PLAN OF CARE
Goal Outcome Evaluation:    Orientation: A/Ox4  Activity: IND.  Diet/BS Checks: Reg, poor appetite reported by the day shift nurse.   Tele: None  IV Access/Drains: Port infusing NS+KCL+Mag @ 125ml/hr with int chemo  Pain Management: C/o pain to lower abd and flank, PO dilaudid 4mg given for 7-9 /10 x2  and was effective. On scheduled Tylenol  Abnormal VS/Results: VSS on RA except jacobo NOC at 52.  Bowel/Bladder: C/o feeling of bloated and constipated. last BM 11/1.   Skin/Wounds: WDL  Consults: Hem/onc, nutrition consult for poor appetite, palliative consult placed for pain management  D/C Disposition: Discharge to home Sunday 11/5 upon completion of chemo.  Other: K & Mag protocols. Recheck in process. Chemo precaution maintained.

## 2023-11-03 NOTE — CONSULTS
CLINICAL NUTRITION SERVICES  -  ASSESSMENT NOTE    Recommendations Ordered by Registered Dietitian (RD):   Encouraged po intake, emphasizing the importance of protein to preserve lean muscle mass  Ordered Ensure Enlive strawberry and chocolate ensure milkshake to trial.    Malnutrition:   % Weight Loss:  > 2% in 1 week (severe malnutrition)  % Intake:  Decreased intake does not meet criteria for malnutrition  Subcutaneous Fat Loss:  None observed  Muscle Loss:  None observed  Fluid Retention:  None noted    Malnutrition Diagnosis: Patient does not meet two of the above criteria necessary for diagnosing malnutrition  In Context of:  Chronic illness or disease     REASON FOR ASSESSMENT  Jayson Banegas is a 28 year old male seen by Registered Dietitian for RN Consult - Just starting chemo, poor appetite.    PMH of: OBED, MDD, PTSD, alcohol use disorder and nicotine dependence, both in remission, obesity, who was directly admitted at the [facility] for chemotherapy induction for recently diagnosed testicular cancer with associated abdominal pain secondary to lymphadenopathy.      NUTRITION HISTORY  - Information obtained from chart review and pt at bedside  - Diet at home: regular, ties to eat a lot of fiber  - Usual intakes: apple for breakfast, yogurt and granola for lunch, and steak or burger for dinner, but eating meat has become difficult since being Dx.   - Use of oral supplements: Delroy tried chocolate Ensure and didn't like it because it was chalky. However, he could get  it down if needed. He was also agreeable to trying strawberry and also a chocolate ensure milkshake  - Allergies: NKFA  - UBW: 209 lb  - Delroy was in good spirits while discussing his nutrition Hx while saying his po intake has decreased significantly since because it hurts to eat, saying he normally eats 3 meals per day but has only been having about 1 meal per day for the past couple weeks.    CURRENT NUTRITION  "ORDERS  Diet Order:   Regular     Current Intake/Tolerance: Delroy had just finished lunch, where he barely finished 50% of the meal. Delroy said he's still learning about which foods he can tolerate better than others, saying soft/liquid foods seem to be going down better.     GI: No recorded BM this admit.   \"Distended but no rigidity or guarding. Diffuse abdominal pain, not localizing to any particular area.\"    NUTRITION FOCUSED PHYSICAL ASSESSMENT FOR DIAGNOSING MALNUTRITION)  Yes         Observed:    No nutrition-related physical findings observed    ANTHROPOMETRICS  Height: 5' 9\"  Weight: 200 lbs 9.9 oz (91 kg)  Body mass index is 29.63 kg/m .  Weight Status:  Overweight BMI 25-29.9  IBW: 160 lb (72.7 kg)  % IBW: 125%  Weight History: -3.2 kg (3.5%) in 2 weeks   Wt Readings from Last 10 Encounters:   11/01/23 91 kg (200 lb 9.9 oz)   10/31/23 92.5 kg (204 lb)   10/27/23 92.1 kg (203 lb)   10/24/23 92.5 kg (204 lb)   10/18/23 94.2 kg (207 lb 9.6 oz)      LABS  Mg+ 2.6 (H), Cr 0.57 (L), ,     MEDICATIONS  Medications reviewed    ASSESSED NUTRITION NEEDS PER APPROVED PRACTICE GUIDELINES:  Dosing Weight 77.3 kg (AjBW)    Estimated Energy Needs: 7076-4415 kcals (25-30 Kcal/Kg)  Justification: maintenance  Estimated Protein Needs: 77-93 grams protein (1-1.2 g pro/Kg)  Justification: preservation of lean body mass  Estimated Fluid Needs: 2277-5205+ mL (1+ mL/Kcal)  Justification: maintenance    MALNUTRITION:  % Weight Loss:  > 2% in 1 week (severe malnutrition)  % Intake:  Decreased intake does not meet criteria for malnutrition  Subcutaneous Fat Loss:  None observed  Muscle Loss:  None observed  Fluid Retention:  None noted    Malnutrition Diagnosis: Patient does not meet two of the above criteria necessary for diagnosing malnutrition  In Context of:  Chronic illness or disease    NUTRITION DIAGNOSIS:  Predicted inadequate nutrient intake related to taste aversions and increased needs as evidenced by 3.2% wt loss " in 2 week and sub-optimal po intake.     NUTRITION INTERVENTIONS  Recommendations / Nutrition Prescription  Encouraged po intake, emphasizing the importance of protein to preserve lean muscle mass  Ordered Ensure Enlive strawberry and chocolate ensure milkshake to trial.     Implementation  Nutrition education: Per Provider order if indicated   General/healthful diet and Medical Food Supplement    Nutrition Goals  Patient to consume % of nutritionally adequate meal trays TID, or the equivalent with supplements/snacks.    MONITORING AND EVALUATION:  Progress towards goals will be monitored and evaluated per protocol and Practice Guidelines    Flex Guillen RD, LD

## 2023-11-03 NOTE — PLAN OF CARE
[id number]  Pt ate decent breakfast today.  But later developed nausea on/off throughout the day.  Pt given prn compazine, ativan, zofran and seabands.  Did feel better this evening. Using prn dilaudid for pain, started on mscontin today also.  Bowel meds increased (no BM for two days).  K and mag WNL.  Parents at bedside.  Plan for discharge home when chemo complete on Sunday.

## 2023-11-03 NOTE — PROGRESS NOTES
Care Management Follow Up    Length of Stay (days): 2    Expected Discharge Date: 11/05/2023     Concerns to be Addressed:       Patient plan of care discussed at interdisciplinary rounds: Yes    Anticipated Discharge Disposition: Home     Anticipated Discharge Services: None  Anticipated Discharge DME: None    Patient/family educated on Medicare website which has current facility and service quality ratings: no  Education Provided on the Discharge Plan:    Patient/Family in Agreement with the Plan: yes    Referrals Placed by CM/SW: Internal Clinic Care Coordination, Community Resources  Private pay costs discussed: Not applicable    Additional Information:  Faxed ACS referral to [phone].    Inpatient Care Coordinator will continue to follow for discharge planning.   MAX Riggs RN, BSN, OCN   Inpatient Care Coordination [address]  Office: [phone]

## 2023-11-03 NOTE — PROGRESS NOTES
SPIRITUAL HEALTH SERVICES - Progress Note    Oncology    Referral Source: Admission Request (follow up)  Saw pt. Delroy Banegas and his parents briefly in his room as a follow up from yesterday's visit. Delroy did not express any additional spiritual or emotional needs at this time.    Plan: Spiritual Health remains available as needs arise.      Karla Quezada  Chaplain Resident    [facility] routine referrals *[phone]    [facility] available 24/7 for emergent requests/referrals, either by paging the on-call  or by entering an ASAP/STAT consult in Epic (this will also page the on-call ).

## 2023-11-03 NOTE — PROGRESS NOTES
[facility]    Medicine Progress Note - Hospitalist Service    Date of Admission:  11/1/2023    Assessment & Plan   Jayson Banegas is a 28 year old male with PMH significant for OBED, MDD, PTSD, alcohol use disorder and nicotine dependence, both in remission, obesity, who was directly admitted at the recommendation of [facility] for chemotherapy induction for recently diagnosed testicular cancer with associated abdominal pain secondary to lymphadenopathy.      Metastatic embryonal carcinoma, recently diagnosed  Recently dx'd testicular cancer, per oncology notes - embryonal carcinoma, clinical stage IIIB, [id number], S2, intermediate risk, probable metastases to bilateral lungs, diffuse abdominal/pelvic/retroperitoneal lymphadenopathy. Plan for fertility preservation, sperm banking done. Saw Urology, and no indication for orchiectomy as no lesions seen on scrotal ultrasound. Port in place. Abd pain as below related lymphadenopathy, on oral dilaudid PTA. Plan for induction chemotherapy per oncology notes, 5 days of treatment with BEP (bleomycin, etoposide, cisplatin) x4 cycles.  - Inpatient admission for induction chemotherapy, oncology unit  - [facility] consultation for chemotherapy and associated orders  - Supportive care  - Port cares  - basic blood work CMP, CBC, Magnesium- unremarkable     Cancer-related abdominal pain   Presented to PCP with LLQ abd pain 9/2023, workup ultimately revealed diffuse hypermetabolic pulmonary nodules, and lymphadenopathy throughout retroperitoneum, mesentery, bilateral iliac chains, L>R, as well as in Lt groin region. On admission pain remains 6-7/10 despite PO Dilaudid.  -Pain control - scheduled APAP, PO dilaudid 2-4mg Q 4 hours PRN, IV dilaudid 0.2-0.4mg Q 2 hours PRN  - Ice/Warm packs PRN if helpful  - palliative care consult appreciated  - started on MS Contin 15 mg po BID  - referral to outpatient palliative clinic      "  Constipation  - bowel regimen Senna- docusate 3-4 tab po BID, Miralax daily  - Dulcolax supp prn     Generalized anxiety disorder  Moderate episode of recurrent major depressive disorder  PTSD  Stopped PTA sertraline 50mg/d ~1 month ago as he did not want to take to many pills knowing he was going to start chemotherapy. On admission, d/w patient and parents, he is agreeable to restarting this medication.  -Restart sertraline 50mg/d, if interacts with oncology medications, ok to put on hold, defer to oncology  -PRN PO ativan for anxiety     Alcohol use disorder, in remission     Nicotine dependence, in remission          Diet: Combination Diet Regular Diet Adult  Snacks/Supplements Adult: Other; ok ot order supplements; With Meals  Snacks/Supplements Adult: Ensure Enlive; With Meals    DVT Prophylaxis: Ambulate every shift  Sanders Catheter: Not present  Lines: PRESENT      Port a Cath 10/27/23 Right Chest wall-Site Assessment: WDL      Cardiac Monitoring: None  Code Status: Full Code      Clinically Significant Risk Factors                         # Overweight: Estimated body mass index is 29.63 kg/m  as calculated from the following:    Height as of this encounter: 1.753 m (5' 9\").    Weight as of this encounter: 91 kg (200 lb 9.9 oz)., PRESENT ON ADMISSION            Disposition Plan      Expected Discharge Date: 11/05/2023                    Lilia Lester MD  Hospitalist Service  [facility]  Securely message with [employer] (more info)  Text page via [employer] Paging/Directory   ______________________________________________________________________    Interval History   States he is feeling a little better today  Appetite is better; ate breakfast and lunch  Reports lower abd pain  No BM since admission  No chest pain, no SOB      Physical Exam   Vital Signs: Temp: 97.7  F (36.5  C) Temp src: Oral BP: 136/73 Pulse: 79   Resp: 18 SpO2: 95 % O2 Device: None (Room air)    Weight: 200 lbs 9.9 " oz        Medical Decision Making       MANAGEMENT DISCUSSED with the following over the past 24 hours: the patient, RN   NOTE(S)/MEDICAL RECORDS REVIEWED over the past 24 hours: Oncology notes  Tests REVIEWED in the past 24 hours:  - K  - Magnesium      Data     I have personally reviewed the following data over the past 24 hrs:    N/A  \   N/A   / N/A     N/A N/A N/A /  N/A   4.9 N/A N/A \       Imaging results reviewed over the past 24 hrs:   No results found for this or any previous visit (from the past 24 hour(s)).

## 2023-11-04 LAB
ANION GAP SERPL CALCULATED.3IONS-SCNC: 11 MMOL/L (ref 7–15)
BASOPHILS # BLD AUTO: 0 10E3/UL (ref 0–0.2)
BASOPHILS NFR BLD AUTO: 0 %
BUN SERPL-MCNC: 10 MG/DL (ref 6–20)
CALCIUM SERPL-MCNC: 8.8 MG/DL (ref 8.6–10)
CHLORIDE SERPL-SCNC: 104 MMOL/L (ref 98–107)
CREAT SERPL-MCNC: 0.67 MG/DL (ref 0.67–1.17)
DEPRECATED HCO3 PLAS-SCNC: 23 MMOL/L (ref 22–29)
EGFRCR SERPLBLD CKD-EPI 2021: >90 ML/MIN/1.73M2
EOSINOPHIL # BLD AUTO: 0 10E3/UL (ref 0–0.7)
EOSINOPHIL NFR BLD AUTO: 0 %
ERYTHROCYTE [DISTWIDTH] IN BLOOD BY AUTOMATED COUNT: 12.5 % (ref 10–15)
GLUCOSE SERPL-MCNC: 78 MG/DL (ref 70–99)
HCT VFR BLD AUTO: 41.6 % (ref 40–53)
HGB BLD-MCNC: 13.3 G/DL (ref 13.3–17.7)
IMM GRANULOCYTES # BLD: 0 10E3/UL
IMM GRANULOCYTES NFR BLD: 0 %
LYMPHOCYTES # BLD AUTO: 1 10E3/UL (ref 0.8–5.3)
LYMPHOCYTES NFR BLD AUTO: 13 %
MAGNESIUM SERPL-MCNC: 2.4 MG/DL (ref 1.7–2.3)
MCH RBC QN AUTO: 27.9 PG (ref 26.5–33)
MCHC RBC AUTO-ENTMCNC: 32 G/DL (ref 31.5–36.5)
MCV RBC AUTO: 87 FL (ref 78–100)
MONOCYTES # BLD AUTO: 0.4 10E3/UL (ref 0–1.3)
MONOCYTES NFR BLD AUTO: 5 %
NEUTROPHILS # BLD AUTO: 6.3 10E3/UL (ref 1.6–8.3)
NEUTROPHILS NFR BLD AUTO: 82 %
NRBC # BLD AUTO: 0 10E3/UL
NRBC BLD AUTO-RTO: 0 /100
PLATELET # BLD AUTO: 343 10E3/UL (ref 150–450)
POTASSIUM SERPL-SCNC: 4.6 MMOL/L (ref 3.4–5.3)
POTASSIUM SERPL-SCNC: 4.8 MMOL/L (ref 3.4–5.3)
RBC # BLD AUTO: 4.76 10E6/UL (ref 4.4–5.9)
SODIUM SERPL-SCNC: 138 MMOL/L (ref 135–145)
WBC # BLD AUTO: 7.6 10E3/UL (ref 4–11)

## 2023-11-04 PROCEDURE — 82374 ASSAY BLOOD CARBON DIOXIDE: CPT | Performed by: INTERNAL MEDICINE

## 2023-11-04 PROCEDURE — 84132 ASSAY OF SERUM POTASSIUM: CPT | Performed by: HOSPITALIST

## 2023-11-04 PROCEDURE — 258N000003 HC RX IP 258 OP 636: Performed by: INTERNAL MEDICINE

## 2023-11-04 PROCEDURE — 250N000012 HC RX MED GY IP 250 OP 636 PS 637: Performed by: INTERNAL MEDICINE

## 2023-11-04 PROCEDURE — 82947 ASSAY GLUCOSE BLOOD QUANT: CPT | Performed by: INTERNAL MEDICINE

## 2023-11-04 PROCEDURE — 250N000013 HC RX MED GY IP 250 OP 250 PS 637: Performed by: NURSE PRACTITIONER

## 2023-11-04 PROCEDURE — 85025 COMPLETE CBC W/AUTO DIFF WBC: CPT | Performed by: INTERNAL MEDICINE

## 2023-11-04 PROCEDURE — 83735 ASSAY OF MAGNESIUM: CPT | Performed by: HOSPITALIST

## 2023-11-04 PROCEDURE — 99232 SBSQ HOSP IP/OBS MODERATE 35: CPT | Performed by: INTERNAL MEDICINE

## 2023-11-04 PROCEDURE — 250N000011 HC RX IP 250 OP 636: Performed by: INTERNAL MEDICINE

## 2023-11-04 PROCEDURE — 120N000001 HC R&B MED SURG/OB

## 2023-11-04 PROCEDURE — 99233 SBSQ HOSP IP/OBS HIGH 50: CPT | Performed by: INTERNAL MEDICINE

## 2023-11-04 PROCEDURE — 250N000013 HC RX MED GY IP 250 OP 250 PS 637: Performed by: INTERNAL MEDICINE

## 2023-11-04 PROCEDURE — 250N000013 HC RX MED GY IP 250 OP 250 PS 637: Performed by: PHYSICIAN ASSISTANT

## 2023-11-04 RX ORDER — OLANZAPINE 5 MG/1
5 TABLET, ORALLY DISINTEGRATING ORAL AT BEDTIME
Status: DISCONTINUED | OUTPATIENT
Start: 2023-11-04 | End: 2023-11-04

## 2023-11-04 RX ORDER — OLANZAPINE 5 MG/1
5 TABLET, ORALLY DISINTEGRATING ORAL AT BEDTIME
Status: DISCONTINUED | OUTPATIENT
Start: 2023-11-04 | End: 2023-11-05 | Stop reason: HOSPADM

## 2023-11-04 RX ADMIN — LORAZEPAM 1 MG: 0.5 TABLET ORAL at 12:55

## 2023-11-04 RX ADMIN — DOCUSATE SODIUM 50 MG AND SENNOSIDES 8.6 MG 4 TABLET: 8.6; 5 TABLET, FILM COATED ORAL at 08:00

## 2023-11-04 RX ADMIN — MORPHINE SULFATE 15 MG: 15 TABLET, EXTENDED RELEASE ORAL at 09:04

## 2023-11-04 RX ADMIN — SERTRALINE HYDROCHLORIDE 50 MG: 50 TABLET ORAL at 09:04

## 2023-11-04 RX ADMIN — PROCHLORPERAZINE MALEATE 10 MG: 10 TABLET ORAL at 10:57

## 2023-11-04 RX ADMIN — PROCHLORPERAZINE MALEATE 10 MG: 10 TABLET ORAL at 06:21

## 2023-11-04 RX ADMIN — DOCUSATE SODIUM 50 MG AND SENNOSIDES 8.6 MG 3 TABLET: 8.6; 5 TABLET, FILM COATED ORAL at 21:06

## 2023-11-04 RX ADMIN — ACETAMINOPHEN 1000 MG: 500 TABLET, FILM COATED ORAL at 15:25

## 2023-11-04 RX ADMIN — OLANZAPINE 5 MG: 5 TABLET, ORALLY DISINTEGRATING ORAL at 15:25

## 2023-11-04 RX ADMIN — DEXAMETHASONE 12 MG: 4 TABLET ORAL at 12:54

## 2023-11-04 RX ADMIN — MAGNESIUM SULFATE HEPTAHYDRATE: 500 INJECTION, SOLUTION INTRAMUSCULAR; INTRAVENOUS at 06:20

## 2023-11-04 RX ADMIN — ETOPOSIDE 210 MG: 20 INJECTION INTRAVENOUS at 14:42

## 2023-11-04 RX ADMIN — MORPHINE SULFATE 15 MG: 15 TABLET, EXTENDED RELEASE ORAL at 21:06

## 2023-11-04 RX ADMIN — ONDANSETRON 16 MG: 8 TABLET ORAL at 12:55

## 2023-11-04 RX ADMIN — ACETAMINOPHEN 1000 MG: 500 TABLET, FILM COATED ORAL at 21:06

## 2023-11-04 RX ADMIN — MAGNESIUM SULFATE HEPTAHYDRATE: 500 INJECTION, SOLUTION INTRAMUSCULAR; INTRAVENOUS at 14:01

## 2023-11-04 RX ADMIN — HYDROMORPHONE HYDROCHLORIDE 4 MG: 2 TABLET ORAL at 04:29

## 2023-11-04 RX ADMIN — CISPLATIN 42 MG: 1 INJECTION, SOLUTION INTRAVENOUS at 16:05

## 2023-11-04 RX ADMIN — POLYETHYLENE GLYCOL 3350 17 G: 17 POWDER, FOR SOLUTION ORAL at 08:00

## 2023-11-04 RX ADMIN — LORAZEPAM 0.5 MG: 2 INJECTION INTRAMUSCULAR; INTRAVENOUS at 06:56

## 2023-11-04 RX ADMIN — ACETAMINOPHEN 1000 MG: 500 TABLET, FILM COATED ORAL at 08:00

## 2023-11-04 ASSESSMENT — ACTIVITIES OF DAILY LIVING (ADL)
ADLS_ACUITY_SCORE: 20

## 2023-11-04 NOTE — PROGRESS NOTES
[facility]    Medicine Progress Note - Hospitalist Service    Date of Admission:  11/1/2023    Assessment & Plan   Jayson Banegas is a 28 year old male with PMH significant for OBED, MDD, PTSD, alcohol use disorder and nicotine dependence, both in remission, obesity, who was directly admitted at the recommendation of [facility] for chemotherapy induction for recently diagnosed testicular cancer with associated abdominal pain secondary to lymphadenopathy.      Metastatic embryonal carcinoma, recently diagnosed  Recently dx'd testicular cancer, per oncology notes - embryonal carcinoma, clinical stage IIIB, [id number], S2, intermediate risk, probable metastases to bilateral lungs, diffuse abdominal/pelvic/retroperitoneal lymphadenopathy. Plan for fertility preservation, sperm banking done. Saw Urology, and no indication for orchiectomy as no lesions seen on scrotal ultrasound. Port in place. Abd pain as below related lymphadenopathy, on oral dilaudid PTA. Plan for induction chemotherapy per oncology notes, 5 days of treatment with BEP (bleomycin, etoposide, cisplatin) x4 cycles.  - Inpatient admission for induction chemotherapy, oncology unit  - [facility] consultation for chemotherapy and associated orders  - Supportive care  - Port cares  - basic blood work CMP, CBC, Magnesium- unremarkable     Cancer-related abdominal pain   Presented to PCP with LLQ abd pain 9/2023, workup ultimately revealed diffuse hypermetabolic pulmonary nodules, and lymphadenopathy throughout retroperitoneum, mesentery, bilateral iliac chains, L>R, as well as in Lt groin region. On admission pain remains 6-7/10 despite PO Dilaudid.  -Pain control - scheduled APAP, PO dilaudid 2-4mg Q 4 hours PRN, IV dilaudid 0.2-0.4mg Q 2 hours PRN  - Ice/Warm packs PRN if helpful  - palliative care consult appreciated  - started on MS Contin 15 mg po BID  - started Zyprexa 5 mg po qhs  - referral to outpatient  "palliative clinic       Constipation  - bowel regimen Senna- docusate 3-4 tab po BID, Miralax daily  - Dulcolax supp prn     Generalized anxiety disorder  Moderate episode of recurrent major depressive disorder  PTSD  Stopped PTA sertraline 50mg/d ~1 month ago as he did not want to take to many pills knowing he was going to start chemotherapy. On admission, d/w patient and parents, he is agreeable to restarting this medication.  -Restart sertraline 50mg/d, if interacts with oncology medications, ok to put on hold, defer to oncology  -PRN PO ativan for anxiety     Alcohol use disorder, in remission     Nicotine dependence, in remission          Diet: Combination Diet Regular Diet Adult  Snacks/Supplements Adult: Other; ok ot order supplements; With Meals  Snacks/Supplements Adult: Ensure Enlive; With Meals    DVT Prophylaxis: Ambulate every shift  Sanders Catheter: Not present  Lines: PRESENT      Port a Cath 10/27/23 Right Chest wall-Site Assessment: WDL      Cardiac Monitoring: None  Code Status: Full Code      Clinically Significant Risk Factors                         # Overweight: Estimated body mass index is 29.63 kg/m  as calculated from the following:    Height as of this encounter: 1.753 m (5' 9\").    Weight as of this encounter: 91 kg (200 lb 9.9 oz)., PRESENT ON ADMISSION            Disposition Plan      Expected Discharge Date: 11/05/2023                    Lilia Lester MD  Hospitalist Service  [facility]  Securely message with [employer] (more info)  Text page via "[employer] Paging/Directory   ______________________________________________________________________    Interval History   Feeling nauseated, no vomiting  Ate breakfast and lunch  States today - the \"pain is not too bad\"  Discussed with Dr Ha and RN      Physical Exam   Vital Signs: Temp: 98.3  F (36.8  C) Temp src: Oral BP: (!) 149/86 Pulse: 67   Resp: 16 SpO2: 96 % O2 Device: None (Room air)    Weight: 200 lbs 9.9 " oz        Medical Decision Making       MANAGEMENT DISCUSSED with the following over the past 24 hours: the patient, Oncology,  RN   NOTE(S)/MEDICAL RECORDS REVIEWED over the past 24 hours: Oncology notes  Tests REVIEWED in the past 24 hours:  - CBC, BMP    Data     I have personally reviewed the following data over the past 24 hrs:    7.6  \   13.3   / 343     138 104 10.0 /  78   4.6; 4.8 23 0.67 \       Imaging results reviewed over the past 24 hrs:   No results found for this or any previous visit (from the past 24 hour(s)).

## 2023-11-04 NOTE — PROGRESS NOTES
SPIRITUAL HEALTH SERVICES - Progress Note  [facility]    Referral Source: follow up request.     Delroy declined first attempt due to cares. On second attempt pt was sleeping. People present shared Delroy is Confucianist and inquired about the sacrament of the sick. I followed up with Delroy later in the day to share the soonest a  would be able to come would be on Monday. Delroy shared he is discharging on Sunday and will plan to follow up with his  at home. Delroy denied  follow up tomorrow due to exhaustion and discharge tomorrow.     Plan: Informed pt how he can request further  support.      Sheryl Valente  Chaplain Resident    [facility] routine referrals *[phone]  [facility] available 24/7 for emergent requests/referrals, either by paging the on-call  or by entering an ASAP/STAT consult in Epic (this will also page the on-call ).

## 2023-11-04 NOTE — PLAN OF CARE
Orientation: A&Ox4  Activity: Ind  Diet/BS Checks: Regular  Tele:  N/A  IV Access/Drains: R port infusing IVF @ 125mL/hr  Pain Management: scheduled tylenol and MS contin. PRN IV dilaudid given x1. PRN PO dilaudid given x1.  Abnormal VS/Results: VSS on RA ex slightly jacobo  Bowel/Bladder: Continent. C/o constipation. Scheduled senna given with no results overnight. BS active and pt passing flatus.  Skin/Wounds: skin intact  Consults: Oncology  D/C Disposition: pending completion of chemo on Sunday  Other info: K and Mg replacement protocols in place, rechecks this AM. Some nausea reported this AM, compazine given x1 - not effective. IV ativan given x1..

## 2023-11-04 NOTE — PLAN OF CARE
[id number]  Pt continues with on/off nausea throughout day.  Did eat a good breakfast though.  Using all prns medications available.  Zyprexa added today.  IVFs continue into port.  Labs looking good.  Day four of chemo completed this shift.  Pt able to have BM today.  Pain overall improved today, no prn dilaudid needed.  Pt napped on/off this afternoon.  Parents at bedside most of day.  Plan for discharge tomorrow after chemo complete.

## 2023-11-04 NOTE — PROGRESS NOTES
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 Wick  On BEP day 4  Continue with chemo day 4 today of etoposide and cisplatin completes tomorrow      Nausea  Start zyprexa at beditme will start now         On discharge we need to  Arrange for bleomycin days 1, 8, 15; etoposide days 1-5, cisplatin days 1-5 with day 1 on 11/27/2023.  Lab draw weekly.  RTC NP 11/27/2023 with lab draw.  RTC [place] on 12/18 at 11:20am with lab draw and CT chest/abdomen/pelvis prior to visit.  Arrange for bleomycin days 1, 8, 15; etoposide days 1-5, cisplatin days 1-5 with day 1 on 12/18/2023.  Preston sent the schedulers and 's nurse to work on scheduling appointments above       Total time spent on day of visit, including review of tests, obtaining/reviewing separately obtained history, ordering medications/tests/procedures, communicating with PCP/consultants, and documenting in electronic medical record: 50 minutes       INTERIM HISTORY:  Labs stable. Has more nausea eating well. 8/10 nausea. Abdominal pain is improved. Family in the room     MEDICATIONS:  Reviewed         PHYSICAL EXAM:  Vital signs:  Temp: 98.3  F (36.8  C) Temp src: Oral BP: (!) 149/86 Pulse: 67   Resp: 16 SpO2: 96 % O2 Device: None (Room air)   Height: 175.3 cm (5' 9\") Weight: 91 kg (200 lb 9.9 oz)  Estimated body mass index is 29.63 kg/m  as calculated from the following:    Height as of this encounter: 1.753 m (5' 9\").    Weight as of this encounter: 91 kg (200 lb 9.9 oz).      GENERAL/CONSTITUTIONAL: No acute distress.  Heart RRR  Lungs clear   Abdomen Soft       LABS:  Noted         Kavon Ha MD  [facility] [place]   [phone]    Chart documentation with Dragon Voice recognition Software. Although " reviewed after completion, some words and grammatical errors may remain.

## 2023-11-05 VITALS
HEIGHT: 69 IN | HEART RATE: 72 BPM | WEIGHT: 200.62 LBS | DIASTOLIC BLOOD PRESSURE: 74 MMHG | RESPIRATION RATE: 18 BRPM | TEMPERATURE: 97.9 F | OXYGEN SATURATION: 95 % | SYSTOLIC BLOOD PRESSURE: 129 MMHG | BODY MASS INDEX: 29.71 KG/M2

## 2023-11-05 LAB
MAGNESIUM SERPL-MCNC: 2.4 MG/DL (ref 1.7–2.3)
POTASSIUM SERPL-SCNC: 4.3 MMOL/L (ref 3.4–5.3)

## 2023-11-05 PROCEDURE — 250N000011 HC RX IP 250 OP 636: Performed by: INTERNAL MEDICINE

## 2023-11-05 PROCEDURE — 250N000012 HC RX MED GY IP 250 OP 636 PS 637: Performed by: INTERNAL MEDICINE

## 2023-11-05 PROCEDURE — 99239 HOSP IP/OBS DSCHRG MGMT >30: CPT | Performed by: INTERNAL MEDICINE

## 2023-11-05 PROCEDURE — 250N000013 HC RX MED GY IP 250 OP 250 PS 637: Performed by: PHYSICIAN ASSISTANT

## 2023-11-05 PROCEDURE — 84132 ASSAY OF SERUM POTASSIUM: CPT | Performed by: INTERNAL MEDICINE

## 2023-11-05 PROCEDURE — 83735 ASSAY OF MAGNESIUM: CPT | Performed by: INTERNAL MEDICINE

## 2023-11-05 PROCEDURE — 99233 SBSQ HOSP IP/OBS HIGH 50: CPT | Performed by: INTERNAL MEDICINE

## 2023-11-05 PROCEDURE — 250N000013 HC RX MED GY IP 250 OP 250 PS 637: Performed by: NURSE PRACTITIONER

## 2023-11-05 PROCEDURE — 258N000003 HC RX IP 258 OP 636: Performed by: INTERNAL MEDICINE

## 2023-11-05 RX ORDER — HEPARIN SODIUM (PORCINE) LOCK FLUSH IV SOLN 100 UNIT/ML 100 UNIT/ML
5-10 SOLUTION INTRAVENOUS
Status: DISCONTINUED | OUTPATIENT
Start: 2023-11-05 | End: 2023-11-05 | Stop reason: HOSPADM

## 2023-11-05 RX ORDER — DEXAMETHASONE 4 MG/1
8 TABLET ORAL DAILY
Qty: 6 TABLET | Refills: 0 | Status: SHIPPED | OUTPATIENT
Start: 2023-11-06 | End: 2023-11-21

## 2023-11-05 RX ORDER — MORPHINE SULFATE 15 MG/1
15 TABLET, FILM COATED, EXTENDED RELEASE ORAL EVERY 12 HOURS
Qty: 60 TABLET | Refills: 0 | Status: SHIPPED | OUTPATIENT
Start: 2023-11-05 | End: 2023-11-24

## 2023-11-05 RX ORDER — PROCHLORPERAZINE MALEATE 10 MG
10 TABLET ORAL EVERY 6 HOURS PRN
Qty: 25 TABLET | Refills: 0 | Status: SHIPPED | OUTPATIENT
Start: 2023-11-05 | End: 2023-11-21

## 2023-11-05 RX ORDER — ONDANSETRON 4 MG/1
4 TABLET, ORALLY DISINTEGRATING ORAL EVERY 6 HOURS PRN
Qty: 30 TABLET | Refills: 0 | Status: SHIPPED | OUTPATIENT
Start: 2023-11-06 | End: 2023-11-21

## 2023-11-05 RX ORDER — AMOXICILLIN 250 MG
3-4 CAPSULE ORAL 2 TIMES DAILY
Qty: 100 TABLET | Refills: 0 | Status: SHIPPED | OUTPATIENT
Start: 2023-11-05 | End: 2023-11-24

## 2023-11-05 RX ORDER — POLYETHYLENE GLYCOL 3350 17 G/17G
17 POWDER, FOR SOLUTION ORAL DAILY
Qty: 510 G | Refills: 0 | Status: SHIPPED | OUTPATIENT
Start: 2023-11-05 | End: 2024-05-01

## 2023-11-05 RX ORDER — OLANZAPINE 5 MG/1
5 TABLET, ORALLY DISINTEGRATING ORAL AT BEDTIME
Qty: 30 TABLET | Refills: 0 | Status: SHIPPED | OUTPATIENT
Start: 2023-11-05 | End: 2023-11-24

## 2023-11-05 RX ORDER — LORAZEPAM 0.5 MG/1
.5-1 TABLET ORAL EVERY 6 HOURS PRN
Qty: 20 TABLET | Refills: 0 | Status: SHIPPED | OUTPATIENT
Start: 2023-11-05 | End: 2023-11-17

## 2023-11-05 RX ORDER — HYDROMORPHONE HYDROCHLORIDE 2 MG/1
2-4 TABLET ORAL EVERY 4 HOURS PRN
Qty: 30 TABLET | Refills: 0 | Status: SHIPPED | OUTPATIENT
Start: 2023-11-05 | End: 2023-11-15

## 2023-11-05 RX ADMIN — MORPHINE SULFATE 15 MG: 15 TABLET, EXTENDED RELEASE ORAL at 08:02

## 2023-11-05 RX ADMIN — PROCHLORPERAZINE EDISYLATE 10 MG: 5 INJECTION INTRAMUSCULAR; INTRAVENOUS at 06:12

## 2023-11-05 RX ADMIN — HEPARIN SODIUM (PORCINE) LOCK FLUSH IV SOLN 100 UNIT/ML 5 ML: 100 SOLUTION at 15:08

## 2023-11-05 RX ADMIN — HYDROMORPHONE HYDROCHLORIDE 4 MG: 2 TABLET ORAL at 00:13

## 2023-11-05 RX ADMIN — ONDANSETRON 16 MG: 8 TABLET ORAL at 12:04

## 2023-11-05 RX ADMIN — ETOPOSIDE 210 MG: 20 INJECTION INTRAVENOUS at 12:45

## 2023-11-05 RX ADMIN — SERTRALINE HYDROCHLORIDE 50 MG: 50 TABLET ORAL at 08:02

## 2023-11-05 RX ADMIN — ACETAMINOPHEN 1000 MG: 500 TABLET, FILM COATED ORAL at 08:02

## 2023-11-05 RX ADMIN — DEXAMETHASONE 12 MG: 4 TABLET ORAL at 12:05

## 2023-11-05 RX ADMIN — MAGNESIUM SULFATE HEPTAHYDRATE: 500 INJECTION, SOLUTION INTRAMUSCULAR; INTRAVENOUS at 08:01

## 2023-11-05 RX ADMIN — MAGNESIUM SULFATE HEPTAHYDRATE: 500 INJECTION, SOLUTION INTRAMUSCULAR; INTRAVENOUS at 00:17

## 2023-11-05 RX ADMIN — CISPLATIN 42 MG: 1 INJECTION, SOLUTION INTRAVENOUS at 14:07

## 2023-11-05 ASSESSMENT — ACTIVITIES OF DAILY LIVING (ADL)
ADLS_ACUITY_SCORE: 20

## 2023-11-05 NOTE — PROGRESS NOTES
Pt doing better today.  Ate well, up ambulating.  Had BM today also.  Pt completed final chemo infusion this afternoon without issue.  All discharge instructions were reviewed with pt and his parents, questions were answered and medications were filled and given to pt prior to discharge.  Pt was discharged home with his parents.

## 2023-11-05 NOTE — CONSULTS
Care Management Discharge Note    Discharge Date: 11/05/2023       Discharge Disposition: Home    Discharge Services: None    Discharge DME: None    Discharge Transportation:      Private pay costs discussed: Not applicable    Does the patient's insurance plan have a 3 day qualifying hospital stay waiver?  No    PAS Confirmation Code:  n/a  Patient/family educated on Medicare website which has current facility and service quality ratings: no    Education Provided on the Discharge Plan:  yes  Persons Notified of Discharge Plans: Patient, Parents at the bedside  Patient/Family in Agreement with the Plan: yes    Handoff Referral Completed: NO    Additional Information:  Writer met with patient and parents at the bedside. Patient is cleared for discharge to home with follow up's included in AVS.  Patient and parents feel comfortable with the plan of care going forward.   No further needs for discharge at this time.    Paola Newton RN, BSN, ACM   Care Transitions Specialist  [facility]  Care Transitions Specialist  [id number] [id number]  [email or web address]  Office: [phone] Fax: [phone]  [facility]

## 2023-11-05 NOTE — PROVIDER NOTIFICATION
Per Dr. Ha pt does not need decadron days 6-8 as stated in [place] since his nausea has improved.  Pt's discharge medications arrived with this but was returned to discharge pharmacy by writer.  Pt and family instructed that this was not needed at time of discharge.

## 2023-11-05 NOTE — PLAN OF CARE
[id number]    Orientation: A&Ox4    Activity: Independent    Diet/BS Checks: Regular    Tele: N/A    IV Access/Drains: R port infusing NS+KCl+Mg @ 125 mL/hr    Pain Management: 7/10 lower abd pain managed with scheduled tylenol, MS contin, and PRN dilaudid x1    Abnormal VS/Results: VSS on RA ex bradycardic    Bowel/Bladder: Continent    Skin/Wounds: Skin intact    Consults: Hem/Onc, Palliative    D/C Disposition: Today pending completion of chemo    Other Info: PRN compazine x1 given for nausea - effective. K+ and Mg protocols.

## 2023-11-05 NOTE — PROGRESS NOTES
[facility]    Hematology/Oncology Follow-up Note      Today's Date: 11/05/23  Date of Admission:  11/1/2023  Reason for Consult: Testicular cancer      ASSESSMENT/PLAN:  Jayson Banegas is a 28 year old male on BEP last treatment     -ok to discharge  -continue zyprexa at bedtime  -office will call to schedule appts he has an appt on wed  -no dex on discharge, discussed with his nurse    Total time spent on day of visit, including review of tests, obtaining/reviewing separately obtained history, ordering medications/tests/procedures, communicating with PCP/consultants, and documenting in electronic medical record: 50 minutes     INTERIM HISTORY:  Doing better, nausea improved pain better 4/10 eating well. Tolerated chemo well     MEDICATIONS:  Current Facility-Administered Medications   Medication    acetaminophen (TYLENOL) tablet 650 mg    Or    acetaminophen (TYLENOL) Suppository 650 mg    acetaminophen (TYLENOL) tablet 1,000 mg    albuterol (PROVENTIL HFA/VENTOLIN HFA) inhaler    albuterol (PROVENTIL) neb solution 2.5 mg    bisacodyl (DULCOLAX) suppository 10 mg    bleomycin (BLENOXANE) 30 Units in sodium chloride 0.9 % 65 mL infusion    CISplatin (PLATINOL) 42 mg in sodium chloride 0.9 % 592 mL infusion    dexAMETHasone (DECADRON) tablet 12 mg    [START ON 11/6/2023] dexAMETHasone (DECADRON) tablet 8 mg    diphenhydrAMINE (BENADRYL) injection 50 mg    EPINEPHrine PF (ADRENALIN) injection 0.3 mg    etoposide (TOPOSAR) 210 mg in sodium chloride 0.9 % 1,060.5 mL infusion    famotidine (PEPCID) injection 20 mg    [START ON 11/6/2023] heparin 100 unit/mL injection 5-10 mL    heparin lock flush 10 UNIT/ML injection 5-10 mL    heparin lock flush 10 UNIT/ML injection 5-10 mL    HYDROmorphone (DILAUDID) injection 0.2 mg    HYDROmorphone (DILAUDID) injection 0.4 mg    HYDROmorphone (DILAUDID) tablet 2 mg    HYDROmorphone (DILAUDID) tablet 4 mg    LORazepam (ATIVAN) injection 0.5-1 mg     "LORazepam (ATIVAN) tablet 0.5-1 mg    melatonin tablet 1 mg    meperidine (DEMEROL) injection 25 mg    methylPREDNISolone sodium succinate (solu-MEDROL) injection 125 mg    morphine (MS CONTIN) 12 hr tablet 15 mg    naloxone (NARCAN) injection 0.2 mg    Or    naloxone (NARCAN) injection 0.4 mg    Or    naloxone (NARCAN) injection 0.2 mg    Or    naloxone (NARCAN) injection 0.4 mg    OLANZapine zydis (zyPREXA) ODT tab 5 mg    [START ON 2023] ondansetron (ZOFRAN ODT) ODT tab 4 mg    Or    [START ON 2023] ondansetron (ZOFRAN) injection 4 mg    ondansetron (ZOFRAN) tablet 16 mg    polyethylene glycol (MIRALAX) Packet 17 g    prochlorperazine (COMPAZINE) injection 10 mg    prochlorperazine (COMPAZINE) tablet 10 mg    senna-docusate (SENOKOT-S/PERICOLACE) 8.6-50 MG per tablet 3-4 tablet    sertraline (ZOLOFT) tablet 50 mg    sodium chloride (PF) 0.9% PF flush 10-20 mL    sodium chloride (PF) 0.9% PF flush 10-20 mL    [START ON 2023] sodium chloride (PF) 0.9% PF flush 10-20 mL    sodium chloride 0.9 % 1,000 mL with potassium chloride 20 mEq/L, magnesium sulfate 8 mEq/L infusion    sodium chloride 0.9% BOLUS 500 mL    sodium phosphate (FLEET ENEMA) 1 enema           ALLERGIES:  Allergies   Allergen Reactions    Sulfamethoxazole-Trimethoprim Rash         PHYSICAL EXAM:  Vital signs:  Temp: 97.9  F (36.6  C) Temp src: Oral BP: 129/74 Pulse: 72   Resp: 18 SpO2: 95 % O2 Device: None (Room air)   Height: 175.3 cm (5' 9\") Weight: 91 kg (200 lb 9.9 oz)  Estimated body mass index is 29.63 kg/m  as calculated from the following:    Height as of this encounter: 1.753 m (5' 9\").    Weight as of this encounter: 91 kg (200 lb 9.9 oz).    ECO  GENERAL/CONSTITUTIONAL: No acute distress.  EYES: Pupils are equal, round, and react to light and accommodation. Extraocular movements intact.  No scleral icterus.  ENT/MOUTH: Neck supple. Oropharynx clear, no mucositis.  LYMPH: No anterior cervical, posterior cervical, " supraclavicular, axillary or inguinal adenopathy.   RESPIRATORY: Clear to auscultation bilaterally. No crackles or wheezing.   CARDIOVASCULAR: Regular rate and rhythm without murmurs, gallops, or rubs.  GASTROINTESTINAL: No hepatosplenomegaly, masses, or tenderness. The patient has normal bowel sounds. No guarding.  No distention.  MUSCULOSKELETAL: Warm and well-perfused, no cyanosis, clubbing, or edema.  NEUROLOGIC: Cranial nerves II-XII are intact. Alert, oriented, answers questions appropriately.  INTEGUMENTARY: No rashes or jaundice.  GAIT: Steady, does not use assistive device      LABS:  CBC RESULTS:   Recent Labs   Lab Test 11/04/23  0615   WBC 7.6   RBC 4.76   HGB 13.3   HCT 41.6   MCV 87   MCH 27.9   MCHC 32.0   RDW 12.5          Recent Labs   Lab Test 11/05/23  0628 11/04/23  0615 11/03/23  0639 11/02/23  0623   NA  --  138  --  135   POTASSIUM 4.3 4.8  4.6   < > 4.4   CHLORIDE  --  104  --  100   CO2  --  23  --  21*   ANIONGAP  --  11  --  14   GLC  --  78  --  101*   BUN  --  10.0  --  6.7   CR  --  0.67  --  0.57*   ALEX  --  8.8  --  8.9    < > = values in this interval not displayed.         PATHOLOGY:  na    IMAGING:  lobo Ha MD  Hematology/Oncology  [facility]

## 2023-11-05 NOTE — DISCHARGE SUMMARY
"[facility]  Hospitalist Discharge Summary      Date of Admission:  11/1/2023  Date of Discharge:  11/5/2023  3:29 PM  Discharging Provider: Lilia Lester MD  Discharge Service: Hospitalist Service    Discharge Diagnoses   Metastatic embryonal carcinoma   Cancer-related abdominal pain   Constipation  Generalized anxiety disorder  Moderate episode of recurrent major depressive disorder  PTSD      Clinically Significant Risk Factors     # Overweight: Estimated body mass index is 29.63 kg/m  as calculated from the following:    Height as of this encounter: 1.753 m (5' 9\").    Weight as of this encounter: 91 kg (200 lb 9.9 oz).       Follow-ups Needed After Discharge   Follow-up Appointments     Follow-up and recommended labs and tests       Follow up with primary Oncology.    Follow up with Palliative care team for pain management.            Unresulted Labs Ordered in the Past 30 Days of this Admission       No orders found from 10/2/2023 to 11/2/2023.        None    Discharge Disposition   Discharged to home  Condition at discharge: Good    Hospital Course   Jayson Banegas is a 28 year old male with PMH significant for OBED, MDD, PTSD, alcohol use disorder and nicotine dependence, both in remission, obesity, who was directly admitted at the recommendation of [facility] for chemotherapy induction for recently diagnosed testicular cancer with associated abdominal pain secondary to lymphadenopathy. For a detailed HPI, please refer to H&P done by Steph Barrera PA-C, on 11/1/2023.      Metastatic embryonal carcinoma, recently diagnosed  Recently dx'd testicular cancer, per oncology notes - embryonal carcinoma, clinical stage IIIB, [id number], S2, intermediate risk, probable metastases to bilateral lungs, diffuse abdominal/pelvic/retroperitoneal lymphadenopathy. Plan for fertility preservation, sperm banking done. Saw Urology, and no indication for orchiectomy as no " lesions seen on scrotal ultrasound. Port in place. Abd pain as below related lymphadenopathy, on oral dilaudid PTA. Plan for induction chemotherapy per oncology notes, 5 days of treatment with BEP (bleomycin, etoposide, cisplatin) x4 cycles.  - Inpatient admission for induction chemotherapy, oncology unit  - [facility] consultation for chemotherapy and associated orders  - Supportive care  - Port cares  - basic blood work CMP, CBC, Magnesium- unremarkable  - follow up with Oncology next week.      Cancer-related abdominal pain   Presented to PCP with LLQ abd pain 9/2023, workup ultimately revealed diffuse hypermetabolic pulmonary nodules, and lymphadenopathy throughout retroperitoneum, mesentery, bilateral iliac chains, L>R, as well as in Lt groin region. On admission pain remains 6-7/10 despite PO Dilaudid.  - palliative care consult appreciated  - started on MS Contin 15 mg po BID; also has Dilaudid 2-4 mg po q4h prn   - started Zyprexa 5 mg po at bedtime  - pain reasonable controlled at the time of the discharge.   - referral to outpatient palliative clinic       Constipation  - bowel regimen Senna- docusate 3-4 tab po BID, Miralax daily  - Dulcolax supp prn      Generalized anxiety disorder  Moderate episode of recurrent major depressive disorder  PTSD  Stopped PTA sertraline 50mg/d ~1 month ago as he did not want to take to many pills knowing he was going to start chemotherapy. On admission, d/w patient and parents, he is agreeable to restarting this medication.  -Restart sertraline 50mg/d, if interacts with oncology medications, ok to put on hold, defer to oncology  -PRN PO ativan for anxiety       Consultations This Hospital Stay   CARE MANAGEMENT / SOCIAL WORK IP CONSULT  HEMATOLOGY & ONCOLOGY IP CONSULT  PALLIATIVE CARE ADULT IP CONSULT  NUTRITION SERVICES ADULT IP CONSULT    Code Status   Full Code    Time Spent on this Encounter   Lilia FRYE MD, personally saw the patient today and  spent greater than 30 minutes discharging this patient.       Lilia Lester MD  [place] [address] [facility]  [address]  [place] [address]  Phone: [phone]  ______________________________________________________________________    Physical Exam   Vital Signs: Temp: 97.9  F (36.6  C) Temp src: Oral BP: 129/74 Pulse: 72   Resp: 18 SpO2: 95 % O2 Device: None (Room air)    Weight: 200 lbs 9.9 oz  General Appearance: Awake, alert, NAD  Respiratory: bilateral air entry, no wheezing, no rales, no crackles  Cardiovascular: S1S2, RRR, no murmurs, no rubs  GI: abd- soft, mild tender lower abdomen, BS present  Skin: no rashes, no cyanosis  Neuro AAOX3, no FNDs        Primary Care Physician   Jamar Tran    Discharge Orders      Reason for your hospital stay    Chemotherapy     Follow-up and recommended labs and tests     Follow up with primary Oncology.    Follow up with Palliative care team for pain management.     Activity    Your activity upon discharge: activity as tolerated     When to contact your care team    Call your primary doctor if you have any of the following: temperature greater than 100.5 or less than 96, chills, severe nausea, vomiting- uncontrolled with antinausea medications, uncontrolled pain, dizziness, loss of consciousness.     Diet    Follow this diet upon discharge: Orders Placed This Encounter      Snacks/Supplements Adult: Other; ok ot order supplements; With Meals      Snacks/Supplements Adult: Ensure Enlive; With Meals      Combination Diet Regular Diet Adult       Significant Results and Procedures   Most Recent 3 CBC's:  Recent Labs   Lab Test 11/04/23  0615 11/02/23  0623 11/01/23  1105   WBC 7.6 4.1 5.3   HGB 13.3 13.6 13.2*   MCV 87 86 86    338 326     Most Recent 3 BMP's:  Recent Labs   Lab Test 11/05/23  0628 11/04/23  0615 11/03/23  0639 11/02/23  0623 11/01/23  1041   NA  --  138  --  135 137   POTASSIUM 4.3 4.8  4.6 4.9 4.4 4.1    CHLORIDE  --  104  --  100 101   CO2  --  23  --  21* 25   BUN  --  10.0  --  6.7 6.3   CR  --  0.67  --  0.57* 0.66*   ANIONGAP  --  11  --  14 11   ALEX  --  8.8  --  8.9 9.1   GLC  --  78  --  101* 77     Most Recent 2 LFT's:  Recent Labs   Lab Test 11/01/23  1041   AST 35   ALT 23   ALKPHOS 84   BILITOTAL 0.3     Most Recent 3 INR's:No lab results found.  Most Recent 3 Creatinines:  Recent Labs   Lab Test 11/04/23  0615 11/02/23  0623 11/01/23  1041   CR 0.67 0.57* 0.66*     Most Recent 3 Hemoglobins:  Recent Labs   Lab Test 11/04/23  0615 11/02/23  0623 11/01/23  1105   HGB 13.3 13.6 13.2*     Most Recent 3 BNP's:No lab results found.  7-Day Micro Results       No results found for the last 168 hours.          Most Recent 6 glucoses:  Recent Labs   Lab Test 11/04/23  0615 11/02/23 0623 11/01/23  1041   GLC 78 101* 77   ,   Results for orders placed or performed during the hospital encounter of 10/27/23   IR Chest Port Placement > 5 Yrs of Age    Narrative    PORT PLACEMENT 10/27/2023 1:37 PM    HISTORY: Neoplasm    COMPARISON: None.    DESCRIPTION OF PROCEDURE: After obtaining informed consent, the  patient was placed in a supine position on the fluoroscopy table. The  neck was prepped and draped in the usual sterile manner.     Ultrasound was used to evaluate and document patency of the internal  jugular vein. One percent lidocaine was injected for local anesthesia.  Under sterile ultrasound guidance, a puncture was made into the right  internal jugular vein. A permanent copy image was obtained for the  patient's records.    A 6 Yoruba peel-away sheath was placed into the vein. The chest and  neck were anesthetized with lidocaine. A 2 cm skin incision was made  in the chest. A pocket for a port was created using blunt dissection.  The pocket was washed with antibiotic-laden saline. The pocket was  packed with antibiotic-laden gauze. A tunnel for the port was created  from the pocket to the neck. The port  catheter was pulled through, and  the attached port was then placed into the pocket. The port was  secured in the pocket using 2 Ethilon around the port nozzle. The  catheter was measured to appropriate length and was placed through the  peel-away sheath. The tip was positioned in the mid to high right  atrium. The pocket was closed with three 3-0 Vicryl deep interrupted  stitches and Dermabond. The neck incision site was closed with  Dermabond.    The port was accessed, aspirated, flushed with saline and heparin  locked. A dressing was applied.    A fluoroscopic image was saved to document tip of catheter in  satisfactory position.     I determined this patient to be an appropriate candidate for the  planned sedation and procedure and reassessed the patient immediately  prior to sedation and procedure. Moderate intravenous conscious  sedation was supervised by me. The patient was independently monitored  by a registered nurse assigned to the Department of radiology using  automated blood pressure, EKG and pulse oximetry. The patient  tolerated the procedure well. There were no immediate postprocedure  complications. The patient's vital signs were monitored by radiology  nursing staff under my supervision and remained stable throughout the  study. Radiation dose for this scan was reduced using automated  exposure control, adjustment of the mA and/or kV according to patient  size, or iterative reconstruction technique.    Maximal Sterile Barrier Technique Utilized: Cap AND mask AND sterile  gown AND sterile gloves AND sterile full body drape AND hand hygiene  AND skin preparation 2% chlorhexidine for cutaneous antisepsis (or  acceptable alternative antiseptics).     Sterile Ultrasound Technique Utilized ?Sterile gel AND sterile probe  covers.    MEDICATIONS: 3.5 mg Versed, 175 mcg fentanyl    SEDATION TIME: 20 minutes    FLUOROSCOPY TIME: 0.2 minutes    RADIATION DOSE: 6.79 mGy    NUMBER OF SPOT FLUOROSCOPIC IMAGES:  2      Impression    IMPRESSION: Power port placed as above. The port is ready to use.    ESPINOZA GOLDSTEIN MD         SYSTEM ID:  [id number]       Discharge Medications   Current Discharge Medication List        START taking these medications    Details   dexAMETHasone (DECADRON) 4 MG tablet Take 2 tablets (8 mg) by mouth daily for 3 days  Qty: 6 tablet, Refills: 0    Associated Diagnoses: Embryonal carcinoma (H)      LORazepam (ATIVAN) 0.5 MG tablet Take 1-2 tablets (0.5-1 mg) by mouth every 6 hours as needed for anxiety, nausea or vomiting (Breakthrough Nausea / Vomiting)  Qty: 20 tablet, Refills: 0    Comments: Future refills by PCP Dr. Jamar Tran with phone number [phone].  Associated Diagnoses: Embryonal carcinoma (H)      morphine (MS CONTIN) 15 MG CR tablet Take 1 tablet (15 mg) by mouth every 12 hours  Qty: 60 tablet, Refills: 0    Comments: Future refills by PCP Dr. Jamar Tran with phone number [phone].  Associated Diagnoses: Cancer associated pain      OLANZapine zydis (ZYPREXA) 5 MG ODT Take 1 tablet (5 mg) by mouth at bedtime  Qty: 30 tablet, Refills: 0    Comments: Future refills by PCP Dr. Jamar Tran with phone number [phone].  Associated Diagnoses: Admission for chemotherapy      ondansetron (ZOFRAN ODT) 4 MG ODT tab Take 1 tablet (4 mg) by mouth every 6 hours as needed for nausea or vomiting  Qty: 30 tablet, Refills: 0    Comments: Future refills by PCP Dr. Jamar Tran with phone number [phone].  Associated Diagnoses: Admission for chemotherapy      polyethylene glycol (MIRALAX) 17 GM/Dose powder Take 17 g by mouth daily  Qty: 510 g, Refills: 0    Comments: Future refills by PCP Dr. Jamar Tran with phone number [phone].  Associated Diagnoses: Drug-induced constipation      prochlorperazine (COMPAZINE) 10 MG tablet Take 1 tablet (10 mg) by mouth every 6 hours as needed for nausea or vomiting (Breakthrough Nausea/Vomiting)  Qty: 25 tablet, Refills: 0     Comments: Future refills by PCP Dr. Jamar Tran with phone number [phone].  Associated Diagnoses: Embryonal carcinoma (H)      senna-docusate (SENOKOT-S/PERICOLACE) 8.6-50 MG tablet Take 3-4 tablets by mouth 2 times daily  Qty: 100 tablet, Refills: 0    Comments: Future refills by PCP Dr. Jamar Tran with phone number [phone].  Associated Diagnoses: Drug-induced constipation      sertraline (ZOLOFT) 50 MG tablet Take 1 tablet (50 mg) by mouth daily  Qty: 30 tablet, Refills: 0    Comments: Future refills by PCP Dr. Jamar Tran with phone number [phone].  Associated Diagnoses: OBED (generalized anxiety disorder)           CONTINUE these medications which have CHANGED    Details   HYDROmorphone (DILAUDID) 2 MG tablet Take 1-2 tablets (2-4 mg) by mouth every 4 hours as needed for moderate to severe pain (Take Dilaudid 2 mg every 4 hours as needed for moderate pain, 4 mg every 4 hours as needed for severe pain)  Qty: 30 tablet, Refills: 0    Comments: Future refills by PCP Dr. Jamar Tran with phone number [phone].  Associated Diagnoses: Embryonal carcinoma (H); Cancer associated pain           CONTINUE these medications which have NOT CHANGED    Details   acetaminophen (TYLENOL) 325 MG tablet Take 325-650 mg by mouth every 6 hours as needed for mild pain      lidocaine-prilocaine (EMLA) 2.5-2.5 % external cream Apply topically to port site 30 min prior to port access  Qty: 30 g, Refills: 3    Associated Diagnoses: Embryonal carcinoma (H)           Allergies   Allergies   Allergen Reactions    Sulfamethoxazole-Trimethoprim Rash

## 2023-11-06 DIAGNOSIS — C80.1 EMBRYONAL CARCINOMA (H): Primary | ICD-10-CM

## 2023-11-08 ENCOUNTER — ONCOLOGY VISIT (OUTPATIENT)
Dept: ONCOLOGY | Facility: CLINIC | Age: 28
End: 2023-11-08
Attending: NURSE PRACTITIONER
Payer: COMMERCIAL

## 2023-11-08 ENCOUNTER — INFUSION THERAPY VISIT (OUTPATIENT)
Dept: INFUSION THERAPY | Facility: CLINIC | Age: 28
End: 2023-11-08
Attending: NURSE PRACTITIONER
Payer: COMMERCIAL

## 2023-11-08 VITALS
WEIGHT: 198 LBS | TEMPERATURE: 97.6 F | OXYGEN SATURATION: 99 % | HEART RATE: 90 BPM | SYSTOLIC BLOOD PRESSURE: 115 MMHG | BODY MASS INDEX: 29.24 KG/M2 | DIASTOLIC BLOOD PRESSURE: 77 MMHG | RESPIRATION RATE: 16 BRPM

## 2023-11-08 VITALS
BODY MASS INDEX: 29.24 KG/M2 | WEIGHT: 198 LBS | HEART RATE: 90 BPM | OXYGEN SATURATION: 99 % | SYSTOLIC BLOOD PRESSURE: 115 MMHG | RESPIRATION RATE: 16 BRPM | DIASTOLIC BLOOD PRESSURE: 77 MMHG | TEMPERATURE: 97.6 F

## 2023-11-08 DIAGNOSIS — C80.1 EMBRYONAL CARCINOMA (H): Primary | ICD-10-CM

## 2023-11-08 LAB
ANION GAP SERPL CALCULATED.3IONS-SCNC: 10 MMOL/L (ref 7–15)
BASOPHILS # BLD AUTO: 0 10E3/UL (ref 0–0.2)
BASOPHILS NFR BLD AUTO: 0 %
BUN SERPL-MCNC: 9.8 MG/DL (ref 6–20)
CALCIUM SERPL-MCNC: 9.2 MG/DL (ref 8.6–10)
CHLORIDE SERPL-SCNC: 99 MMOL/L (ref 98–107)
CREAT SERPL-MCNC: 0.64 MG/DL (ref 0.67–1.17)
DEPRECATED HCO3 PLAS-SCNC: 24 MMOL/L (ref 22–29)
EGFRCR SERPLBLD CKD-EPI 2021: >90 ML/MIN/1.73M2
EOSINOPHIL # BLD AUTO: 0.1 10E3/UL (ref 0–0.7)
EOSINOPHIL NFR BLD AUTO: 2 %
ERYTHROCYTE [DISTWIDTH] IN BLOOD BY AUTOMATED COUNT: 12.3 % (ref 10–15)
GLUCOSE SERPL-MCNC: 91 MG/DL (ref 70–99)
HCT VFR BLD AUTO: 44.1 % (ref 40–53)
HGB BLD-MCNC: 14.5 G/DL (ref 13.3–17.7)
IMM GRANULOCYTES # BLD: 0 10E3/UL
IMM GRANULOCYTES NFR BLD: 1 %
LYMPHOCYTES # BLD AUTO: 1.2 10E3/UL (ref 0.8–5.3)
LYMPHOCYTES NFR BLD AUTO: 23 %
MAGNESIUM SERPL-MCNC: 2.1 MG/DL (ref 1.7–2.3)
MCH RBC QN AUTO: 27.8 PG (ref 26.5–33)
MCHC RBC AUTO-ENTMCNC: 32.9 G/DL (ref 31.5–36.5)
MCV RBC AUTO: 85 FL (ref 78–100)
MONOCYTES # BLD AUTO: 0 10E3/UL (ref 0–1.3)
MONOCYTES NFR BLD AUTO: 1 %
NEUTROPHILS # BLD AUTO: 3.9 10E3/UL (ref 1.6–8.3)
NEUTROPHILS NFR BLD AUTO: 73 %
NRBC # BLD AUTO: 0 10E3/UL
NRBC BLD AUTO-RTO: 0 /100
PLATELET # BLD AUTO: 316 10E3/UL (ref 150–450)
POTASSIUM SERPL-SCNC: 4.3 MMOL/L (ref 3.4–5.3)
RBC # BLD AUTO: 5.21 10E6/UL (ref 4.4–5.9)
SODIUM SERPL-SCNC: 133 MMOL/L (ref 135–145)
WBC # BLD AUTO: 5.3 10E3/UL (ref 4–11)

## 2023-11-08 PROCEDURE — 250N000011 HC RX IP 250 OP 636: Mod: JZ | Performed by: INTERNAL MEDICINE

## 2023-11-08 PROCEDURE — 80048 BASIC METABOLIC PNL TOTAL CA: CPT | Performed by: NURSE PRACTITIONER

## 2023-11-08 PROCEDURE — 85025 COMPLETE CBC W/AUTO DIFF WBC: CPT | Performed by: NURSE PRACTITIONER

## 2023-11-08 PROCEDURE — 96413 CHEMO IV INFUSION 1 HR: CPT

## 2023-11-08 PROCEDURE — 96375 TX/PRO/DX INJ NEW DRUG ADDON: CPT

## 2023-11-08 PROCEDURE — 258N000003 HC RX IP 258 OP 636: Performed by: INTERNAL MEDICINE

## 2023-11-08 PROCEDURE — 36591 DRAW BLOOD OFF VENOUS DEVICE: CPT | Performed by: NURSE PRACTITIONER

## 2023-11-08 PROCEDURE — 83735 ASSAY OF MAGNESIUM: CPT | Performed by: NURSE PRACTITIONER

## 2023-11-08 PROCEDURE — 99214 OFFICE O/P EST MOD 30 MIN: CPT | Performed by: NURSE PRACTITIONER

## 2023-11-08 PROCEDURE — 99211 OFF/OP EST MAY X REQ PHY/QHP: CPT | Mod: 25 | Performed by: NURSE PRACTITIONER

## 2023-11-08 RX ORDER — HEPARIN SODIUM,PORCINE 10 UNIT/ML
5-20 VIAL (ML) INTRAVENOUS DAILY PRN
Status: CANCELLED | OUTPATIENT
Start: 2023-11-15

## 2023-11-08 RX ORDER — ALBUTEROL SULFATE 90 UG/1
1-2 AEROSOL, METERED RESPIRATORY (INHALATION)
Status: CANCELLED
Start: 2023-11-15

## 2023-11-08 RX ORDER — EPINEPHRINE 1 MG/ML
0.3 INJECTION, SOLUTION, CONCENTRATE INTRAVENOUS EVERY 5 MIN PRN
Status: CANCELLED | OUTPATIENT
Start: 2023-11-08

## 2023-11-08 RX ORDER — ONDANSETRON 8 MG/1
8 TABLET, FILM COATED ORAL EVERY 8 HOURS PRN
Qty: 30 TABLET | Refills: 2 | Status: SHIPPED | OUTPATIENT
Start: 2023-11-08 | End: 2024-01-02

## 2023-11-08 RX ORDER — HEPARIN SODIUM,PORCINE 10 UNIT/ML
5-20 VIAL (ML) INTRAVENOUS DAILY PRN
Status: CANCELLED | OUTPATIENT
Start: 2023-11-08

## 2023-11-08 RX ORDER — ALBUTEROL SULFATE 0.83 MG/ML
2.5 SOLUTION RESPIRATORY (INHALATION)
Status: CANCELLED | OUTPATIENT
Start: 2023-11-15

## 2023-11-08 RX ORDER — ALBUTEROL SULFATE 0.83 MG/ML
2.5 SOLUTION RESPIRATORY (INHALATION)
Status: CANCELLED | OUTPATIENT
Start: 2023-11-08

## 2023-11-08 RX ORDER — MEPERIDINE HYDROCHLORIDE 25 MG/ML
25 INJECTION INTRAMUSCULAR; INTRAVENOUS; SUBCUTANEOUS EVERY 30 MIN PRN
Status: CANCELLED | OUTPATIENT
Start: 2023-11-08

## 2023-11-08 RX ORDER — HEPARIN SODIUM (PORCINE) LOCK FLUSH IV SOLN 100 UNIT/ML 100 UNIT/ML
5 SOLUTION INTRAVENOUS
Status: CANCELLED | OUTPATIENT
Start: 2023-11-15

## 2023-11-08 RX ORDER — LORAZEPAM 2 MG/ML
0.5 INJECTION INTRAMUSCULAR EVERY 4 HOURS PRN
Status: CANCELLED | OUTPATIENT
Start: 2023-11-08

## 2023-11-08 RX ORDER — MEPERIDINE HYDROCHLORIDE 25 MG/ML
25 INJECTION INTRAMUSCULAR; INTRAVENOUS; SUBCUTANEOUS EVERY 30 MIN PRN
Status: CANCELLED | OUTPATIENT
Start: 2023-11-15

## 2023-11-08 RX ORDER — LORAZEPAM 2 MG/ML
0.5 INJECTION INTRAMUSCULAR EVERY 4 HOURS PRN
Status: CANCELLED | OUTPATIENT
Start: 2023-11-15

## 2023-11-08 RX ORDER — HEPARIN SODIUM (PORCINE) LOCK FLUSH IV SOLN 100 UNIT/ML 100 UNIT/ML
5 SOLUTION INTRAVENOUS
Status: CANCELLED | OUTPATIENT
Start: 2023-11-08

## 2023-11-08 RX ORDER — EPINEPHRINE 1 MG/ML
0.3 INJECTION, SOLUTION, CONCENTRATE INTRAVENOUS EVERY 5 MIN PRN
Status: CANCELLED | OUTPATIENT
Start: 2023-11-15

## 2023-11-08 RX ORDER — DIPHENHYDRAMINE HYDROCHLORIDE 50 MG/ML
50 INJECTION INTRAMUSCULAR; INTRAVENOUS
Status: CANCELLED
Start: 2023-11-15

## 2023-11-08 RX ORDER — PROCHLORPERAZINE MALEATE 10 MG
10 TABLET ORAL EVERY 6 HOURS PRN
Qty: 30 TABLET | Refills: 2 | Status: SHIPPED | OUTPATIENT
Start: 2023-11-08 | End: 2024-05-01

## 2023-11-08 RX ORDER — DIPHENHYDRAMINE HYDROCHLORIDE 50 MG/ML
50 INJECTION INTRAMUSCULAR; INTRAVENOUS
Status: CANCELLED
Start: 2023-11-08

## 2023-11-08 RX ORDER — HEPARIN SODIUM (PORCINE) LOCK FLUSH IV SOLN 100 UNIT/ML 100 UNIT/ML
5 SOLUTION INTRAVENOUS
Status: DISCONTINUED | OUTPATIENT
Start: 2023-11-08 | End: 2023-11-08 | Stop reason: HOSPADM

## 2023-11-08 RX ORDER — ALBUTEROL SULFATE 90 UG/1
1-2 AEROSOL, METERED RESPIRATORY (INHALATION)
Status: CANCELLED
Start: 2023-11-08

## 2023-11-08 RX ORDER — LORAZEPAM 2 MG/ML
0.5 INJECTION INTRAMUSCULAR EVERY 4 HOURS PRN
Status: DISCONTINUED | OUTPATIENT
Start: 2023-11-08 | End: 2023-11-08 | Stop reason: HOSPADM

## 2023-11-08 RX ADMIN — LORAZEPAM 0.5 MG: 2 INJECTION INTRAMUSCULAR; INTRAVENOUS at 09:44

## 2023-11-08 RX ADMIN — FAMOTIDINE 20 MG: 10 INJECTION, SOLUTION INTRAVENOUS at 09:21

## 2023-11-08 RX ADMIN — BLEOMYCIN SULFATE 30 UNITS: 30 INJECTION, POWDER, LYOPHILIZED, FOR SOLUTION INTRAMUSCULAR; INTRAPLEURAL; INTRAVENOUS; SUBCUTANEOUS at 09:48

## 2023-11-08 RX ADMIN — Medication 5 ML: at 10:20

## 2023-11-08 RX ADMIN — SODIUM CHLORIDE 250 ML: 9 INJECTION, SOLUTION INTRAVENOUS at 09:21

## 2023-11-08 ASSESSMENT — PAIN SCALES - GENERAL: PAINLEVEL: MILD PAIN (2)

## 2023-11-08 NOTE — PROGRESS NOTES
Infusion Nursing Note:  Jayson Banegas presents today for C1D8 bleomycin.    Patient seen by provider today: Yes: Preston   present during visit today: Not Applicable.    Note: Feeling some nausea and fatigue. Reviewed all antiemetics and their schedule. Preston discussed this at length also. Pepcid and ativan given today.      Intravenous Access:  Labs drawn without difficulty.  Implanted Port.    Treatment Conditions:  Lab Results   Component Value Date    HGB 14.5 11/08/2023    WBC 5.3 11/08/2023    ANEUTAUTO 3.9 11/08/2023     11/08/2023        Lab Results   Component Value Date     (L) 11/08/2023    POTASSIUM 4.3 11/08/2023    MAG 2.1 11/08/2023    CR 0.64 (L) 11/08/2023    ALEX 9.2 11/08/2023    BILITOTAL 0.3 11/01/2023    ALBUMIN 4.0 11/01/2023    ALT 23 11/01/2023    AST 35 11/01/2023       Results reviewed, labs MET treatment parameters, ok to proceed with treatment.      Post Infusion Assessment:  Patient tolerated infusion without incident.  Blood return noted pre and post infusion.  Site patent and intact, free from redness, edema or discomfort.  No evidence of extravasations.  Access discontinued per protocol.       Discharge Plan:   Scripts sent to [facility] in [place] for compazine and zofran.  AVS to patient via [employer].  Patient will return as prev luis for next appointment.   Patient discharged in stable condition accompanied by: father.  Departure Mode: Ambulatory.      Shahid Dickey RN

## 2023-11-08 NOTE — LETTER
"    11/8/2023         RE: Jayson Banegas  [address]  [address]  [facility] [address]        Dear Colleague,    Thank you for referring your patient, Jayson Banegas, to the [facility]. Please see a copy of my visit note below.    Oncology Rooming Note    November 8, 2023 9:38 AM   Jayson Banegas is a 28 year old male who presents for:    Chief Complaint   Patient presents with     Oncology Clinic Visit     Initial Vitals: /77   Pulse 90   Temp 97.6  F (36.4  C) (Oral)   Resp 16   Wt 89.8 kg (198 lb)   SpO2 99%   BMI 29.24 kg/m   Estimated body mass index is 29.24 kg/m  as calculated from the following:    Height as of 11/1/23: 1.753 m (5' 9\").    Weight as of this encounter: 89.8 kg (198 lb). Body surface area is 2.09 meters squared.  Mild Pain (2) Comment: Data Unavailable   No LMP for male patient.      Pharmacy name entered into [employer]: [id number] - [place], [place] - [address]    Pt seen in infusion by Goran Klak Shari J. Schoenberger, [facility]              Hematology-Oncology Follow-up Note  [facility] [place]     Newly diagnosed testicular cancer  Patient follows with Dr. Wick  Clinical stage IIIb embryonal cell carcinoma undergoing treatment with BEP chemotherapy      ASSESSMENT/ PLAN :      Testicular cancer  Follows with Dr. Wick  He saw Dr. Gilmore from urology no indication for orchiectomy at this present time  11/01/23-started BEP chemotherapy  -Labs reviewed  Okay to proceed with cycle 1 day 8 bleomycin today is scheduled for bleomycin cycle 1 day 15 next week and follow-up with [facility]   Per Dr. Wick's note Neulasta or other growth factor support not advised during BEP regimen.  Will treat despite neutropenia in the subsequent cycles.   He is scheduled for cycle 2 on 11/27   12/15-CT scan chest abdomen pelvis  12/[id number]-gm appointment with Dr. Wick      Fertility  Patient completed sperm banking      Cancer " pain  Abdominal pain and back pain from malignancy  Palliative care following  Continue with Dilaudid  Continue with MS Contin 15 mg p.o. twice daily     Chemotherapy-induced nausea  Is taking Zofran and Compazine  And Zyprexa at night  We gave him Ativan in the clinic which patient states has helped with nausea  Take Ativan as needed nausea  Call our clinic with any changes worsening of symptoms or if nausea does not resolve    Hyponatremia  Mild monitor closely for now          INTERIM HISTORY:  Patient reports overall he was feeling well.  He continues to have some intermittent nausea he is taking Compazine Zofran and Zyprexa.  Patient denies vomiting.  Denies abdominal pain or abdominal distention denies fever chills sweats cough shortness of breath chest pain  Denies edema         MEDICATIONS:  Reviewed         PHYSICAL EXAM:  Vital signs:    Physical Exam  HENT:      Head: Normocephalic.      Right Ear: Tympanic membrane normal.      Nose: Nose normal.      Mouth/Throat:      Mouth: Mucous membranes are moist.   Eyes:      Pupils: Pupils are equal, round, and reactive to light.   Cardiovascular:      Rate and Rhythm: Normal rate.      Pulses: Normal pulses.   Pulmonary:      Effort: Pulmonary effort is normal.   Abdominal:      General: Abdomen is flat.   Musculoskeletal:         General: Normal range of motion.      Cervical back: Normal range of motion.   Skin:     General: Skin is warm.   Neurological:      Mental Status: He is alert.         LABS:  CBC basic metabolic panel and magnesium reviewed      Preston Wiseman Nurse Practitioner   [facility]   [phone]    Chart documentation with [employer]. Although reviewed after completion, some words and grammatical errors may remain.         Again, thank you for allowing me to participate in the care of your patient.        Sincerely,        GRACE Sheppard CNP

## 2023-11-08 NOTE — PROGRESS NOTES
"Oncology Rooming Note    November 8, 2023 9:38 AM   Jayson Banegas is a 28 year old male who presents for:    Chief Complaint   Patient presents with    Oncology Clinic Visit     Initial Vitals: /77   Pulse 90   Temp 97.6  F (36.4  C) (Oral)   Resp 16   Wt 89.8 kg (198 lb)   SpO2 99%   BMI 29.24 kg/m   Estimated body mass index is 29.24 kg/m  as calculated from the following:    Height as of 11/1/23: 1.753 m (5' 9\").    Weight as of this encounter: 89.8 kg (198 lb). Body surface area is 2.09 meters squared.  Mild Pain (2) Comment: Data Unavailable   No LMP for male patient.      Pharmacy name entered into [facility]: [id number] - [place], [place] - [address]    Pt seen in infusion by Goran Klak Shari J. Schoenberger, CMA            "

## 2023-11-08 NOTE — PROGRESS NOTES
Hematology-Oncology Follow-up Note  [facility]     Newly diagnosed testicular cancer  Patient follows with Dr. Wick  Clinical stage IIIb embryonal cell carcinoma undergoing treatment with BEP chemotherapy      ASSESSMENT/ PLAN :      Testicular cancer  Follows with Dr. Wick  He saw Dr. Gilmore from urology no indication for orchiectomy at this present time  11/01/23-started BEP chemotherapy  -Labs reviewed  Okay to proceed with cycle 1 day 8 bleomycin today is scheduled for bleomycin cycle 1 day 15 next week and follow-up with [facility]   Per Dr. Wick's note Neulasta or other growth factor support not advised during BEP regimen.  Will treat despite neutropenia in the subsequent cycles.   He is scheduled for cycle 2 on 11/27   12/15-CT scan chest abdomen pelvis  12/1887-[id number] appointment with Dr. Wick      Fertility  Patient completed sperm banking      Cancer pain  Abdominal pain and back pain from malignancy  Palliative care following  Continue with Dilaudid  Continue with MS Contin 15 mg p.o. twice daily     Chemotherapy-induced nausea  Is taking Zofran and Compazine  And Zyprexa at night  We gave him Ativan in the clinic which patient states has helped with nausea  Take Ativan as needed nausea  Call our clinic with any changes worsening of symptoms or if nausea does not resolve    Hyponatremia  Mild monitor closely for now          INTERIM HISTORY:  Patient reports overall he was feeling well.  He continues to have some intermittent nausea he is taking Compazine Zofran and Zyprexa.  Patient denies vomiting.  Denies abdominal pain or abdominal distention denies fever chills sweats cough shortness of breath chest pain  Denies edema         MEDICATIONS:  Reviewed         PHYSICAL EXAM:  Vital signs:    Physical Exam  HENT:      Head: Normocephalic.      Right Ear: Tympanic membrane normal.      Nose: Nose normal.      Mouth/Throat:      Mouth: Mucous membranes are moist.   Eyes:      Pupils:  Pupils are equal, round, and reactive to light.   Cardiovascular:      Rate and Rhythm: Normal rate.      Pulses: Normal pulses.   Pulmonary:      Effort: Pulmonary effort is normal.   Abdominal:      General: Abdomen is flat.   Musculoskeletal:         General: Normal range of motion.      Cervical back: Normal range of motion.   Skin:     General: Skin is warm.   Neurological:      Mental Status: He is alert.         LABS:  CBC basic metabolic panel and magnesium reviewed      Preston Wiseman Nurse Practitioner   [facility]   [phone]    Chart documentation with Dragon Voice recognition Software. Although reviewed after completion, some words and grammatical errors may remain.

## 2023-11-09 ENCOUNTER — MYC MEDICAL ADVICE (OUTPATIENT)
Dept: ONCOLOGY | Facility: CLINIC | Age: 28
End: 2023-11-09
Payer: COMMERCIAL

## 2023-11-15 ENCOUNTER — INFUSION THERAPY VISIT (OUTPATIENT)
Dept: INFUSION THERAPY | Facility: CLINIC | Age: 28
End: 2023-11-15
Attending: INTERNAL MEDICINE
Payer: COMMERCIAL

## 2023-11-15 VITALS
TEMPERATURE: 97.8 F | SYSTOLIC BLOOD PRESSURE: 117 MMHG | OXYGEN SATURATION: 95 % | HEART RATE: 80 BPM | WEIGHT: 203 LBS | RESPIRATION RATE: 16 BRPM | DIASTOLIC BLOOD PRESSURE: 78 MMHG | BODY MASS INDEX: 29.98 KG/M2

## 2023-11-15 DIAGNOSIS — G89.3 CANCER ASSOCIATED PAIN: ICD-10-CM

## 2023-11-15 DIAGNOSIS — C80.1 EMBRYONAL CARCINOMA (H): ICD-10-CM

## 2023-11-15 DIAGNOSIS — C80.1 EMBRYONAL CARCINOMA (H): Primary | ICD-10-CM

## 2023-11-15 DIAGNOSIS — R39.11 URINARY HESITANCY: ICD-10-CM

## 2023-11-15 LAB
ACANTHOCYTES BLD QL SMEAR: ABNORMAL
ALBUMIN UR-MCNC: NEGATIVE MG/DL
ANION GAP SERPL CALCULATED.3IONS-SCNC: 8 MMOL/L (ref 7–15)
APPEARANCE UR: CLEAR
AUER BODIES BLD QL SMEAR: ABNORMAL
BASO STIPL BLD QL SMEAR: ABNORMAL
BASOPHILS # BLD AUTO: 0 10E3/UL (ref 0–0.2)
BASOPHILS NFR BLD AUTO: 2 %
BILIRUB UR QL STRIP: NEGATIVE
BITE CELLS BLD QL SMEAR: ABNORMAL
BLISTER CELLS BLD QL SMEAR: ABNORMAL
BUN SERPL-MCNC: 7.8 MG/DL (ref 6–20)
BURR CELLS BLD QL SMEAR: ABNORMAL
CALCIUM SERPL-MCNC: 9 MG/DL (ref 8.6–10)
CHLORIDE SERPL-SCNC: 100 MMOL/L (ref 98–107)
COLOR UR AUTO: ABNORMAL
CREAT SERPL-MCNC: 0.64 MG/DL (ref 0.67–1.17)
DACRYOCYTES BLD QL SMEAR: ABNORMAL
DEPRECATED HCO3 PLAS-SCNC: 27 MMOL/L (ref 22–29)
EGFRCR SERPLBLD CKD-EPI 2021: >90 ML/MIN/1.73M2
ELLIPTOCYTES BLD QL SMEAR: ABNORMAL
EOSINOPHIL # BLD AUTO: 0.1 10E3/UL (ref 0–0.7)
EOSINOPHIL NFR BLD AUTO: 5 %
ERYTHROCYTE [DISTWIDTH] IN BLOOD BY AUTOMATED COUNT: 12 % (ref 10–15)
FRAGMENTS BLD QL SMEAR: ABNORMAL
GLUCOSE SERPL-MCNC: 114 MG/DL (ref 70–99)
GLUCOSE UR STRIP-MCNC: NEGATIVE MG/DL
HCT VFR BLD AUTO: 37.6 % (ref 40–53)
HGB BLD-MCNC: 12.2 G/DL (ref 13.3–17.7)
HGB C CRYSTALS: ABNORMAL
HGB UR QL STRIP: NEGATIVE
HOWELL-JOLLY BOD BLD QL SMEAR: ABNORMAL
IMM GRANULOCYTES # BLD: 0 10E3/UL
IMM GRANULOCYTES NFR BLD: 0 %
KETONES UR STRIP-MCNC: NEGATIVE MG/DL
LEUKOCYTE ESTERASE UR QL STRIP: NEGATIVE
LYMPHOCYTES # BLD AUTO: 1 10E3/UL (ref 0.8–5.3)
LYMPHOCYTES NFR BLD AUTO: 49 %
MCH RBC QN AUTO: 28.2 PG (ref 26.5–33)
MCHC RBC AUTO-ENTMCNC: 32.4 G/DL (ref 31.5–36.5)
MCV RBC AUTO: 87 FL (ref 78–100)
MONOCYTES # BLD AUTO: 0.2 10E3/UL (ref 0–1.3)
MONOCYTES NFR BLD AUTO: 10 %
MUCOUS THREADS #/AREA URNS LPF: PRESENT /LPF
NEUTROPHILS # BLD AUTO: 0.7 10E3/UL (ref 1.6–8.3)
NEUTROPHILS NFR BLD AUTO: 34 %
NEUTS HYPERSEG BLD QL SMEAR: ABNORMAL
NITRATE UR QL: NEGATIVE
NRBC # BLD AUTO: 0 10E3/UL
NRBC BLD AUTO-RTO: 0 /100
PH UR STRIP: 6 [PH] (ref 5–7)
PLAT MORPH BLD: ABNORMAL
PLATELET # BLD AUTO: 151 10E3/UL (ref 150–450)
POLYCHROMASIA BLD QL SMEAR: ABNORMAL
POTASSIUM SERPL-SCNC: 4 MMOL/L (ref 3.4–5.3)
RBC # BLD AUTO: 4.32 10E6/UL (ref 4.4–5.9)
RBC AGGLUT BLD QL: ABNORMAL
RBC MORPH BLD: ABNORMAL
RBC URINE: <1 /HPF
ROULEAUX BLD QL SMEAR: ABNORMAL
SICKLE CELLS BLD QL SMEAR: ABNORMAL
SMUDGE CELLS BLD QL SMEAR: ABNORMAL
SODIUM SERPL-SCNC: 135 MMOL/L (ref 135–145)
SP GR UR STRIP: 1.02 (ref 1–1.03)
SPHEROCYTES BLD QL SMEAR: ABNORMAL
STOMATOCYTES BLD QL SMEAR: ABNORMAL
TARGETS BLD QL SMEAR: ABNORMAL
TOXIC GRANULES BLD QL SMEAR: ABNORMAL
UROBILINOGEN UR STRIP-MCNC: NORMAL MG/DL
VARIANT LYMPHS BLD QL SMEAR: PRESENT
WBC # BLD AUTO: 2 10E3/UL (ref 4–11)
WBC URINE: 2 /HPF

## 2023-11-15 PROCEDURE — 96413 CHEMO IV INFUSION 1 HR: CPT

## 2023-11-15 PROCEDURE — 80048 BASIC METABOLIC PNL TOTAL CA: CPT | Performed by: INTERNAL MEDICINE

## 2023-11-15 PROCEDURE — 81001 URINALYSIS AUTO W/SCOPE: CPT | Performed by: PHYSICIAN ASSISTANT

## 2023-11-15 PROCEDURE — 36591 DRAW BLOOD OFF VENOUS DEVICE: CPT | Performed by: INTERNAL MEDICINE

## 2023-11-15 PROCEDURE — 258N000003 HC RX IP 258 OP 636: Performed by: INTERNAL MEDICINE

## 2023-11-15 PROCEDURE — 85025 COMPLETE CBC W/AUTO DIFF WBC: CPT | Performed by: INTERNAL MEDICINE

## 2023-11-15 PROCEDURE — 96375 TX/PRO/DX INJ NEW DRUG ADDON: CPT

## 2023-11-15 PROCEDURE — 250N000011 HC RX IP 250 OP 636: Mod: JZ | Performed by: INTERNAL MEDICINE

## 2023-11-15 RX ORDER — HEPARIN SODIUM (PORCINE) LOCK FLUSH IV SOLN 100 UNIT/ML 100 UNIT/ML
5 SOLUTION INTRAVENOUS
Status: DISCONTINUED | OUTPATIENT
Start: 2023-11-15 | End: 2023-11-15 | Stop reason: HOSPADM

## 2023-11-15 RX ORDER — HYDROMORPHONE HYDROCHLORIDE 2 MG/1
2-4 TABLET ORAL EVERY 4 HOURS PRN
Qty: 60 TABLET | Refills: 0 | Status: SHIPPED | OUTPATIENT
Start: 2023-11-15 | End: 2023-11-24

## 2023-11-15 RX ORDER — LORAZEPAM 2 MG/ML
0.5 INJECTION INTRAMUSCULAR EVERY 4 HOURS PRN
Status: DISCONTINUED | OUTPATIENT
Start: 2023-11-15 | End: 2023-11-15 | Stop reason: HOSPADM

## 2023-11-15 RX ADMIN — SODIUM CHLORIDE 250 ML: 9 INJECTION, SOLUTION INTRAVENOUS at 09:03

## 2023-11-15 RX ADMIN — FAMOTIDINE 20 MG: 10 INJECTION, SOLUTION INTRAVENOUS at 09:03

## 2023-11-15 RX ADMIN — BLEOMYCIN SULFATE 30 UNITS: 30 INJECTION, POWDER, LYOPHILIZED, FOR SOLUTION INTRAMUSCULAR; INTRAPLEURAL; INTRAVENOUS; SUBCUTANEOUS at 09:14

## 2023-11-15 RX ADMIN — LORAZEPAM 0.5 MG: 2 INJECTION INTRAMUSCULAR; INTRAVENOUS at 09:11

## 2023-11-15 RX ADMIN — Medication 5 ML: at 09:45

## 2023-11-15 ASSESSMENT — PAIN SCALES - GENERAL: PAINLEVEL: SEVERE PAIN (7)

## 2023-11-15 NOTE — PROGRESS NOTES
Infusion Nursing Note:  Jayson Dalton Banegas presents today for bleomycin.    Patient seen by provider today: No   present during visit today: Not Applicable.    Note: pt c/o urinary hesitancy. U/A sent. NOS [facility] to refill dilaudid script per pt request. Dr Wick ok'd treatment today despite ANC of 0.7. Advised: to avoid crowds and being around anyone who is ill. Wear a mask in public. Call us for fever of 100.4 or greater.    Intravenous Access:  Implanted Port.    Treatment Conditions:  ANC 0.7  Plt 151ua        Post Infusion Assessment:  Patient tolerated infusion without incident.  Site patent and intact, free from redness, edema or discomfort.  No evidence of extravasations.  Access discontinued per protocol.       Discharge Plan:   Patient and/or family verbalized understanding of discharge instructions and all questions answered.  AVS to patient via [employer].  Patient will return 11/27 for next appointment.   Patient discharged in stable condition accompanied by: grandmother.  Departure Mode: Ambulatory.      Elmira Earl RN

## 2023-11-16 ENCOUNTER — VIRTUAL VISIT (OUTPATIENT)
Dept: PALLIATIVE CARE | Facility: CLINIC | Age: 28
End: 2023-11-16
Attending: INTERNAL MEDICINE
Payer: COMMERCIAL

## 2023-11-16 VITALS — BODY MASS INDEX: 30.07 KG/M2 | WEIGHT: 203 LBS | HEIGHT: 69 IN

## 2023-11-16 DIAGNOSIS — G89.3 NEOPLASM RELATED PAIN: ICD-10-CM

## 2023-11-16 DIAGNOSIS — C80.1 EMBRYONAL CARCINOMA (H): Primary | ICD-10-CM

## 2023-11-16 DIAGNOSIS — F41.1 GAD (GENERALIZED ANXIETY DISORDER): Chronic | ICD-10-CM

## 2023-11-16 PROCEDURE — 99205 OFFICE O/P NEW HI 60 MIN: CPT | Mod: VID | Performed by: INTERNAL MEDICINE

## 2023-11-16 PROCEDURE — 99417 PROLNG OP E/M EACH 15 MIN: CPT | Mod: VID | Performed by: INTERNAL MEDICINE

## 2023-11-16 RX ORDER — SERTRALINE HYDROCHLORIDE 100 MG/1
100 TABLET, FILM COATED ORAL DAILY
Qty: 30 TABLET | Refills: 1 | Status: SHIPPED | OUTPATIENT
Start: 2023-11-16 | End: 2024-01-16

## 2023-11-16 ASSESSMENT — PAIN SCALES - GENERAL: PAINLEVEL: EXTREME PAIN (8)

## 2023-11-16 NOTE — PROGRESS NOTES
Palliative Care Outpatient Clinic      Patient ID:  Medical - He has metastatic testicular cancer (embryonal carcinoma), presenting late Sept 2023 (mets to lung, intraabd & retroperitoneal lymph nodes).   11/2023 initiated BEP in the hospital    He has OBED; MDD since 13 years old, started ADs when 18; PTSD; alcohol use disorder--went through chem dep treatment 6 years ago, been in recovery since.    Social - Lives with girlfriend. Parents closely involved. Mom Deb. Stays with parents sometimes. Grandma stays with him during the day sometimes. 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    Care Planning - No HCD  10/2023 Dr Wick told him 73% 5 year survival. Cancer is curable.     Opioid Safety -   Hx of AUD in recovery x 6 years  Martin discussion with him about elevated addiction risk with opioids and bzds 11/2023.   11/2023 asked him not to drive until specifically told it's ok  D/w him plan to fully taper opioids off, assuming he responds well to his definitive anticancer tx, even if he has some residual pain.     History:  History gathered today from: patient, family/loved ones, medical chart, outside records including Care Everywhere  Chloe Boyd with him    Our team met him in the hospital recently where he was initiating chemotherapy.    Pain  Worsening the last couple days; it was better after chemo  Pain is bilateral upper quadrant, lower chest, also deep pelvic pain  When he got out of the hospital he took little dilaudid (eg 0-1 a day); but his dilaudid use has increased markedly the last couple days, eg up to 2 tabs q4h.  He was on steroids in the hospital and I think a couple days afterwards  Did not get decadron with bleo yesterday.    He used cannabis before being on opioids and it helped for his pain.    Having urinary hesitancy also the last couple days.  BMs regular; adjusting senna     Takes lorazepam prn for nausea--1-2 a day  Takes zofran every night and Zyprexa at  "night  Compazine    Mood  \"Ok\"  Down and sad sometimes; tries to have a positive outlook he says.  Deb notes he's more irritable, and can get focused on things \"do that, do that\".  Sertraline started just 2 weeks ago 50 mg--he's been on long term but stopped briefly.    PDMP MSER 15 mg #60 11/5, HM 2 mg #30 11/5.    PE: Ht 1.753 m (5' 9\")   Wt 92.1 kg (203 lb)   BMI 29.98 kg/m     Wt Readings from Last 3 Encounters:   11/16/23 92.1 kg (203 lb)   11/15/23 92.1 kg (203 lb)   11/08/23 89.8 kg (198 lb)     Alert NAD      Data reviewed:  I reviewed recent labs and imaging, my comments:  Na 135  Cr 0.6  Hgb 12.2    Outside PET Oct  IMPRESSION:   1. Multiple hypermetabolic pulmonary nodules most likely reflecting   metastatic disease.   2. Abnormal hypermetabolic left retrocrural lymph node. Otherwise no   abnormal adenopathy in the neck or chest.   3. Diffuse hypermetabolic lymphadenopathy throughout the   retroperitoneum, mesentery, and bilateral iliac chains left greater   than right as well as in the left groin region. Left groin lymph node   would likely be the most accessible site for biopsy.   4. Spleen shows normal FDG uptake. No abnormal bony FDG uptake.      database reviewed: y      Impression & Recommendations:  29 yo with testicular cancer on BEP, cancer related pain    Pain  I think his pain responded really well to steroids which have now worn off and that's why his pain is worsening this week.  I will ask Dr Wick if Angel can be on celecoxib; to date platelets are normal  Medical cannabis enrollment today--he took it in the past for his pain with good effects.    Opioid safety  Don't drive until doc says so  I had a praneeth discussion with him about addiction risk and watch for emotional numbing effects of opioids and benzodiazepines.  He is at higher risk of harm from opioids due to AUD hx    OBED  Increase sertraline to 100 mg  If he needs a prn would give him hydroxyzine 25-50 mg given his AUD hx " (want to minimize exposure to benzodiazepines).    Urinary hesitancy; follow-up with oncology about this    Follow-up 3 weeks    Over 70 minutes spent on the date of the encounter doing chart review, history and exam, patient education & counseling, documentation and other activities as noted above.    Thank you for involving us in the patient's care.   Jayesh Monahan MD / Palliative Medicine / Text me via [employer]  This note may have been composed with voice recognition software and there may be mistranscriptions.    Virtual Visit Details  Type of service:  Video Visit   Originating Location (pt. Location): Home  Distant Location (provider location):  Off-site  Platform used for Video Visit: Olinda

## 2023-11-16 NOTE — NURSING NOTE
Is the patient currently in the state of [place]? YES    Visit mode:VIDEO    If the visit is dropped, the patient can be reconnected by: VIDEO VISIT: Text to cell phone:   Telephone Information:   Mobile [phone]       Will anyone else be joining the visit? NO  (If patient encounters technical issues they should call [phone]    How would you like to obtain your AVS? MyChart    Are changes needed to the allergy or medication list? Pt stated no changes to allergies and Pt stated no med changes    Reason for visit: Consult    Judith HERNANDEZ

## 2023-11-16 NOTE — PATIENT INSTRUCTIONS
Thank you for meeting with us in the [facility] Palliative Care Clinic.    How to get a hold of us:  For non-urgent matters, MyChart works best.    For more urgent matters, or if you prefer not to use MyChart, call our clinic nurse Miladis Rubio at [phone].     We have an on-call number for evenings and weekends. Please call this only if you are having uncontrolled symptoms or serious side effects from your medicines: [phone].     For refills, please give us a week (5 working days) notice. We don't always have providers available everyday to do refills. If you call the day you run out of your medicine, we may not be able to refill it in time, so call 5 days in advance!      Constipation and Opioid (Pain Medicine) Use    Constipation is when you are not able to have a bowel movement (BM) for several days. Constipation can also mean that stools that are hard or difficult to pass without straining and pushing. Constipation is a common problem and the reason many people are admitted to the hospital. Even if you are not eating, you still need to have a bowel movement at least every other day.      Taking opioids (pain medicine) will make you constipated. This problem will not go away as long as you are taking opioids. Common opioids which are prescribed are: hydrocodone (Vicodin , Norco , Lortab ); morphine (MSContin , MSIR ); oxycodone (OxyContin , Percocet , OxyIR , Roxicodone); hydromorphone (Dilaudid , Exalgo ); fentanyl (Duragesic ); methadone.    What can I do to avoid constipation?  Most people taking opioids need to take laxatives every day to prevent constipation.  See the guide below for using laxatives.       Step Medicine Directions     1.  You should start taking these medicines the same day you start taking opioids (pain medication).    Senna 8.6mg tablets (over the counter medication - you do not need a prescription).  Also known as Senokot or Ex-lax 1-2 tablets twice a day. May increase up  to 4 tablets twice a day if needed for a daily bowel movement and may need to decrease if your bowels become loose.   2.  If you have not had a bowel movement in 48 hours, CALL YOUR DOCTOR. Your doctor may tell you to use one of the medicines listed below:    Miralax (polyethylene glycol) Over the counter medication - you do not need a prescription.  17g dissolved in 4-8oz of liquid once or twice a day. Again, may need to increase to twice a day if not having a daily bowel movement or decrease if stools become loose.    Milk of Magnesia (Over the counter medication - you do not need a prescription.) 1-2 tablespoons once a day           When should I call my doctor?  Call your doctor if you have not had a bowel movement 24 hours after following the above steps.    You should also call your doctor if you have any of the following symptoms:  Watery stools (this can be a sign of severe constipation or too much bowel medicine)  No bowel movement in 48 hours   Small stools; Hard stools  Pain    Special Instructions:    _______________________________________________________________    _______________________________________________________________    _______________________________________________________________    _______________________________________________________________    _______________________________________________________________    _______________________________________________________________    _______________________________________________________________    _______________________________________________________________    _______________________________________________________________      [facility] Palliative Care Clinics   The [facility] Palliative Care Team is committed to treating your pain and other symptoms. This handout is for all patients treated with opioid medications in our clinics.     What are opioid medicines?   Opioid medications are used to  ease some types of pain and shortness of breath. They are sometimes called narcotics. They include: Morphine (MS Contin, Roxanol), Oxycodone (OxyContin, OxyFast, Percocet), Hydrocodone (Vicodin, Norco), Hydromorphone (Dilaudid), Fentanyl (Duragesic), Methadone (Dolophine).   Are opioid medicines safe to take?   Opioid medicines are safe when you follow the directions from your doctor or medical provider.  Opioids can cause serious side effects and become unsafe if you do not follow your instructions.   To make sure you are taking opioid medicines safely, we may ask you to bring in your pill bottles or to allow us to test your urine. Our goal is to keep you safe and to improve your ability to function & be active. If we don t think opioids are safely doing that, we will work with you to taper you off of them.   Do not drive unless your medical provider tells you it is safe.   Do not take opioids with alcohol or anxiety/sleep medicines unless your doctor tells you it is ok to do so.   Are opioids addictive?   Addiction means you crave a drug and have trouble limiting the amount you use.   Addiction is more likely to happen if you take opioids to relieve stress or to feel altered. If you or your loved one feels this way when taking opioid medicines, let your medical provider know. We may refer you for additional assessment or services if this is a concern.   Your body will get used to the opioid medicines if you take them for more than two weeks in a row. This means if you stop them suddenly, you may have withdrawal. If this occurs, you will feel uncomfortable (nausea, diarrhea, increased pain). This does not mean you are addicted. Never stop taking your pain medicines all at once unless your medical provider tells you to. If you think you need less medicine, your medical provider will help you to safely lower your dose.   What is the safest way to store opioids?   Keep your medicines in a safe place away from  children, teens, pets, and visitors. Be careful about who knows that you have these medicines.   How do I get rid of my old opioids?   Some pharmacies or counties (for example, [facility]) have drop boxes for disposal, or you can purchase a disposal kit from your local pharmacy. If neither of these options is available, the Food and Drug Administration recommends that you flush unused opioids down the toilet.   Do not share or sell your pain medicines. This is illegal and very dangerous. We cannot prescribe opioid pain medicines to you if you do this.   How do I get refills?   Opioid medicine prescriptions have special safety features. This means it may take longer to refill than other medicines. Our clinic cannot prescribe them on weekends or at night.     Give us one week s notice when requesting a refill. This gives us the time we need to get it signed and back to you. It may be possible to mail prescriptions to you.

## 2023-11-16 NOTE — LETTER
11/16/2023       RE: Jayson Banegas  [address]  [address]  [facility] [address]     Dear Colleague,    Thank you for referring your patient, Jayson Banegas, to the [facility]. Please see a copy of my visit note below.    Palliative Care Outpatient Clinic      Patient ID:  Medical - He has metastatic testicular cancer (embryonal carcinoma), presenting late Sept 2023 (mets to lung, intraabd & retroperitoneal lymph nodes).   11/2023 initiated BEP in the hospital    He has OBED; MDD since 13 years old, started ADs when 18; PTSD; alcohol use disorder--went through chem dep treatment 6 years ago, been in recovery since.    Social - Lives with girlfriend. Parents closely involved. Mom Deb. Stays with parents sometimes. Grandma stays with him during the day sometimes. 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    Care Planning - No HCD  10/2023 Dr Wick told him 73% 5 year survival. Cancer is curable.     Opioid Safety -   Hx of AUD in recovery x 6 years  Martin discussion with him about elevated addiction risk with opioids and bzds 11/2023.   11/2023 asked him not to drive until specifically told it's ok  D/w him plan to fully taper opioids off, assuming he responds well to his definitive anticancer tx, even if he has some residual pain.     History:  History gathered today from: patient, family/loved ones, medical chart, outside records including Care Everywhere  Chloe Boyd with him    Our team met him in the hospital recently where he was initiating chemotherapy.    Pain  Worsening the last couple days; it was better after chemo  Pain is bilateral upper quadrant, lower chest, also deep pelvic pain  When he got out of the hospital he took little dilaudid (eg 0-1 a day); but his dilaudid use has increased markedly the last couple days, eg up to 2 tabs q4h.  He was on steroids in the hospital  "and I think a couple days afterwards  Did not get decadron with bleo yesterday.    He used cannabis before being on opioids and it helped for his pain.    Having urinary hesitancy also the last couple days.  BMs regular; adjusting senna     Takes lorazepam prn for nausea--1-2 a day  Takes zofran every night and Zyprexa at night  Compazine    Mood  \"Ok\"  Down and sad sometimes; tries to have a positive outlook he says.  Deb notes he's more irritable, and can get focused on things \"do that, do that\".  Sertraline started just 2 weeks ago 50 mg--he's been on long term but stopped briefly.    PDMP MSER 15 mg #60 11/5, HM 2 mg #30 11/5.    PE: Ht 1.753 m (5' 9\")   Wt 92.1 kg (203 lb)   BMI 29.98 kg/m     Wt Readings from Last 3 Encounters:   11/16/23 92.1 kg (203 lb)   11/15/23 92.1 kg (203 lb)   11/08/23 89.8 kg (198 lb)     Alert NAD      Data reviewed:  I reviewed recent labs and imaging, my comments:  Na 135  Cr 0.6  Hgb 12.2    Outside PET Oct  IMPRESSION:   1. Multiple hypermetabolic pulmonary nodules most likely reflecting   metastatic disease.   2. Abnormal hypermetabolic left retrocrural lymph node. Otherwise no   abnormal adenopathy in the neck or chest.   3. Diffuse hypermetabolic lymphadenopathy throughout the   retroperitoneum, mesentery, and bilateral iliac chains left greater   than right as well as in the left groin region. Left groin lymph node   would likely be the most accessible site for biopsy.   4. Spleen shows normal FDG uptake. No abnormal bony FDG uptake.      database reviewed: y      Impression & Recommendations:  29 yo with testicular cancer on BEP, cancer related pain    Pain  I think his pain responded really well to steroids which have now worn off and that's why his pain is worsening this week.  I will ask Dr Wick if Angel can be on celecoxib; to date platelets are normal  Medical cannabis enrollment today--he took it in the past for his pain with good effects.    Opioid " safety  Don't drive until doc says so  I had a praneeth discussion with him about addiction risk and watch for emotional numbing effects of opioids and benzodiazepines.  He is at higher risk of harm from opioids due to AUD hx    OBED  Increase sertraline to 100 mg  If he needs a prn would give him hydroxyzine 25-50 mg given his AUD hx (want to minimize exposure to benzodiazepines).    Urinary hesitancy; follow-up with oncology about this    Follow-up 3 weeks    Over 70 minutes spent on the date of the encounter doing chart review, history and exam, patient education & counseling, documentation and other activities as noted above.    Thank you for involving us in the patient's care.   Jayesh Monahan MD / Palliative Medicine / Text me via [employer]  This note may have been composed with voice recognition software and there may be mistranscriptions.        Again, thank you for allowing me to participate in the care of your patient.      Sincerely,    Jayesh Monahan MD

## 2023-11-17 DIAGNOSIS — C80.1 EMBRYONAL CARCINOMA (H): ICD-10-CM

## 2023-11-19 ENCOUNTER — HEALTH MAINTENANCE LETTER (OUTPATIENT)
Age: 28
End: 2023-11-19

## 2023-11-20 RX ORDER — LORAZEPAM 0.5 MG/1
.5-1 TABLET ORAL EVERY 6 HOURS PRN
Qty: 20 TABLET | Refills: 0 | Status: SHIPPED | OUTPATIENT
Start: 2023-11-20 | End: 2023-11-24

## 2023-11-21 ENCOUNTER — TELEPHONE (OUTPATIENT)
Dept: ONCOLOGY | Facility: CLINIC | Age: 28
End: 2023-11-21
Payer: COMMERCIAL

## 2023-11-21 ENCOUNTER — DOCUMENTATION ONLY (OUTPATIENT)
Dept: PHARMACY | Facility: CLINIC | Age: 28
End: 2023-11-21
Payer: COMMERCIAL

## 2023-11-21 ENCOUNTER — TELEPHONE (OUTPATIENT)
Dept: NURSING | Facility: CLINIC | Age: 28
End: 2023-11-21
Payer: COMMERCIAL

## 2023-11-21 DIAGNOSIS — C80.1 EMBRYONAL CARCINOMA (H): Primary | ICD-10-CM

## 2023-11-21 DIAGNOSIS — J02.9 SORE THROAT: Primary | ICD-10-CM

## 2023-11-21 RX ORDER — AZITHROMYCIN 250 MG/1
TABLET, FILM COATED ORAL
Qty: 6 TABLET | Refills: 0 | Status: SHIPPED | OUTPATIENT
Start: 2023-11-21 | End: 2023-11-21

## 2023-11-21 RX ORDER — LEVOFLOXACIN 500 MG/1
500 TABLET, FILM COATED ORAL DAILY
Qty: 7 TABLET | Refills: 0 | Status: SHIPPED | OUTPATIENT
Start: 2023-11-21 | End: 2023-11-29

## 2023-11-21 NOTE — TELEPHONE ENCOUNTER
Oncology Nurse Triage    Situation:   Jayson reporting the following symptoms: severe sore throat    Background:   Treating Provider:   Dr Wick     Date of last office visit: 11/8/2023 with Preston Wiseman NP    Recent Treatments:[id number] bleomycin on 11/15/2023    Assessment:     Pt called today.  States that his throat is very sore and it hurts to swallow and it hurts to talk.  Symptoms started last night.  Pt also reports nasal congestion with occasional runny nose with clear drainage.   Denies fever or cough. No shortness of breath or wheezing or chest pain.  Pt states that he is able to swallow, but it is very painful.   When pt looks at the back of his throat in the mirror, it is red.   Rates pain at 7-8 out of 10.  Also states that his lymph nodes appear swollen in his neck.   States that he has tried gargling with salt water and drinking warm tea.   No known exposure to anyone who has been sick that he is aware of.     Recommendations:     Advised pt to continue gargling with salt water and drinking warm beverages.  Also advised pt that he needs to be seen to be evaluated and to possibly test for strep throat or flu or Covid.  Pt states that he agrees with this plan.  Pt reports that he does not live in the [facility] area.  His mom will be at his home soon, and his mom will take him to a local urgent care.    Patient verbalized understanding and agreement with plan.  Patient was instructed to call the clinic with any questions, concerns, or worsening symptoms.    Will also route update to care team.    Connie Molina RN on 11/21/2023 at 11:05 AM

## 2023-11-21 NOTE — TELEPHONE ENCOUNTER
Preston Wiseman, GRACE CNP  You; Tierney Santo, MAX32 minutes ago (3:20 PM)     GK    I ordered Levaquin.  Please advise patient to check temperature frequently in the event of fever chills sweats cough he has to go to the ER     Writer called. Pt's mom answered.   Pt's mom was notified with recommendations per Preston Wiseman NP.  Pt's mom Deb states that she will notify pt with recommendations.  She will  Rx and will have pt start it tonight.    Reviewed symptoms that would warrant ED visit with pt's mom, and she verbalized understanding.  Home care advice for sore throat reviewed, including soft foods and gargling with salt water and/or baking soda/salt/water solution.     Advised pt's mom that we will keep all of pt's future appts as scheduled for now.    Patient's mom verbalized understanding and agreement with plan.  Patient's mom was instructed to call the clinic with any questions, concerns, or worsening symptoms.    Connie Molina RN on 11/21/2023 at 3:58 PM

## 2023-11-21 NOTE — TELEPHONE ENCOUNTER
"Pt and his mom, Deb, called back. States that pt was seen at urgent care today.  Rapid strep test was negative.  Home Covid test was negative.  Urgent care provider felt that symptoms were likely viral in nature.    Per review of notes from [facility] urgent care:    \"Rapid strep was negative. Will send for confirmatory testing. Advised patient to let oncologist know strep was negative. Will order meds if patient needs them based on oncologist recommendation. Patient will call clinic if needed. Patient verbalized understanding. All questions answered\"      Will route to oncology team for review and any further recommendations regarding further treatment of pt's current symptoms.  Pt is currently scheduled for follow up with PATRICK Sorenson, on 11/24/2023 and infusion on 11/27/2023.    Patient verbalized understanding and agreement with plan.  Patient was instructed to call the clinic with any questions, concerns, or worsening symptoms.    Connie Molina RN on 11/21/2023 at 12:03 PM         "

## 2023-11-21 NOTE — TELEPHONE ENCOUNTER
Pt sent the following [employer] message this morning:    Delroy Santo RN1 hour ago (8:46 AM)     TM  I m having difficulty swallowing and it is painful to swallow. What do you think I should do?       See separate telephone encounter from today for further details.    Connie Molina, MAX on 11/21/2023 at 9:58 AM

## 2023-11-21 NOTE — TELEPHONE ENCOUNTER
Telephone call  Patient calling asking what antibiotic could he have with his chemo therapy.  Called the  oncology triage nurse and she recommended  he consult with the pharmacy about the what antibiotics he can take with his chemo.  Recommendation  was conveyed to the patient and he verbalized understanding.    Catherine Shannon RN   [facility] Nurse Advisor  11:21 AM 11/21/2023

## 2023-11-22 ENCOUNTER — APPOINTMENT (OUTPATIENT)
Dept: CT IMAGING | Facility: CLINIC | Age: 28
End: 2023-11-22
Attending: EMERGENCY MEDICINE
Payer: COMMERCIAL

## 2023-11-22 ENCOUNTER — TELEPHONE (OUTPATIENT)
Dept: ONCOLOGY | Facility: CLINIC | Age: 28
End: 2023-11-22
Payer: COMMERCIAL

## 2023-11-22 ENCOUNTER — HOSPITAL ENCOUNTER (EMERGENCY)
Facility: CLINIC | Age: 28
Discharge: HOME | End: 2023-11-22
Attending: EMERGENCY MEDICINE | Admitting: EMERGENCY MEDICINE
Payer: COMMERCIAL

## 2023-11-22 ENCOUNTER — APPOINTMENT (OUTPATIENT)
Dept: GENERAL RADIOLOGY | Facility: CLINIC | Age: 28
End: 2023-11-22
Payer: COMMERCIAL

## 2023-11-22 VITALS
HEART RATE: 90 BPM | DIASTOLIC BLOOD PRESSURE: 68 MMHG | OXYGEN SATURATION: 96 % | TEMPERATURE: 99.2 F | WEIGHT: 202 LBS | SYSTOLIC BLOOD PRESSURE: 109 MMHG | HEIGHT: 70 IN | RESPIRATION RATE: 18 BRPM | BODY MASS INDEX: 28.92 KG/M2

## 2023-11-22 DIAGNOSIS — J02.9 VIRAL PHARYNGITIS: ICD-10-CM

## 2023-11-22 DIAGNOSIS — R59.0 ANTERIOR CERVICAL LYMPHADENOPATHY: ICD-10-CM

## 2023-11-22 LAB
ACANTHOCYTES BLD QL SMEAR: NORMAL
ALBUMIN UR-MCNC: NEGATIVE MG/DL
ANION GAP SERPL CALCULATED.3IONS-SCNC: 10 MMOL/L (ref 7–15)
APPEARANCE UR: CLEAR
AUER BODIES BLD QL SMEAR: NORMAL
BASO STIPL BLD QL SMEAR: NORMAL
BASOPHILS # BLD AUTO: 0 10E3/UL (ref 0–0.2)
BASOPHILS NFR BLD AUTO: 0 %
BILIRUB UR QL STRIP: NEGATIVE
BITE CELLS BLD QL SMEAR: NORMAL
BLISTER CELLS BLD QL SMEAR: NORMAL
BUN SERPL-MCNC: 5.4 MG/DL (ref 6–20)
BURR CELLS BLD QL SMEAR: NORMAL
CALCIUM SERPL-MCNC: 9.5 MG/DL (ref 8.6–10)
CHLORIDE SERPL-SCNC: 102 MMOL/L (ref 98–107)
COLOR UR AUTO: ABNORMAL
CREAT SERPL-MCNC: 0.76 MG/DL (ref 0.67–1.17)
DACRYOCYTES BLD QL SMEAR: NORMAL
DEPRECATED HCO3 PLAS-SCNC: 25 MMOL/L (ref 22–29)
EGFRCR SERPLBLD CKD-EPI 2021: >90 ML/MIN/1.73M2
ELLIPTOCYTES BLD QL SMEAR: NORMAL
EOSINOPHIL # BLD AUTO: 0 10E3/UL (ref 0–0.7)
EOSINOPHIL NFR BLD AUTO: 1 %
ERYTHROCYTE [DISTWIDTH] IN BLOOD BY AUTOMATED COUNT: 14.3 % (ref 10–15)
FLUAV RNA SPEC QL NAA+PROBE: NEGATIVE
FLUBV RNA RESP QL NAA+PROBE: NEGATIVE
FRAGMENTS BLD QL SMEAR: NORMAL
GLUCOSE SERPL-MCNC: 87 MG/DL (ref 70–99)
GLUCOSE UR STRIP-MCNC: NEGATIVE MG/DL
GROUP A STREP BY PCR: NOT DETECTED
HCT VFR BLD AUTO: 40.8 % (ref 40–53)
HGB BLD-MCNC: 13.5 G/DL (ref 13.3–17.7)
HGB C CRYSTALS: NORMAL
HGB UR QL STRIP: NEGATIVE
HOWELL-JOLLY BOD BLD QL SMEAR: NORMAL
IMM GRANULOCYTES # BLD: 0 10E3/UL
IMM GRANULOCYTES NFR BLD: 0 %
KETONES UR STRIP-MCNC: 20 MG/DL
LACTATE SERPL-SCNC: 0.7 MMOL/L (ref 0.7–2)
LEUKOCYTE ESTERASE UR QL STRIP: NEGATIVE
LYMPHOCYTES # BLD AUTO: 1.5 10E3/UL (ref 0.8–5.3)
LYMPHOCYTES NFR BLD AUTO: 30 %
MCH RBC QN AUTO: 28.4 PG (ref 26.5–33)
MCHC RBC AUTO-ENTMCNC: 33.1 G/DL (ref 31.5–36.5)
MCV RBC AUTO: 86 FL (ref 78–100)
MONOCYTES # BLD AUTO: 1.3 10E3/UL (ref 0–1.3)
MONOCYTES NFR BLD AUTO: 25 %
MUCOUS THREADS #/AREA URNS LPF: PRESENT /LPF
NEUTROPHILS # BLD AUTO: 2.3 10E3/UL (ref 1.6–8.3)
NEUTROPHILS NFR BLD AUTO: 44 %
NEUTS HYPERSEG BLD QL SMEAR: NORMAL
NITRATE UR QL: NEGATIVE
NRBC # BLD AUTO: 0 10E3/UL
NRBC BLD AUTO-RTO: 0 /100
PH UR STRIP: 5.5 [PH] (ref 5–7)
PLAT MORPH BLD: NORMAL
PLATELET # BLD AUTO: 172 10E3/UL (ref 150–450)
POLYCHROMASIA BLD QL SMEAR: NORMAL
POTASSIUM SERPL-SCNC: 4.1 MMOL/L (ref 3.4–5.3)
RBC # BLD AUTO: 4.75 10E6/UL (ref 4.4–5.9)
RBC AGGLUT BLD QL: NORMAL
RBC MORPH BLD: NORMAL
RBC URINE: <1 /HPF
ROULEAUX BLD QL SMEAR: NORMAL
RSV RNA SPEC NAA+PROBE: NEGATIVE
SARS-COV-2 RNA RESP QL NAA+PROBE: NEGATIVE
SICKLE CELLS BLD QL SMEAR: NORMAL
SMUDGE CELLS BLD QL SMEAR: NORMAL
SODIUM SERPL-SCNC: 137 MMOL/L (ref 135–145)
SP GR UR STRIP: 1.02 (ref 1–1.03)
SPHEROCYTES BLD QL SMEAR: NORMAL
STOMATOCYTES BLD QL SMEAR: NORMAL
TARGETS BLD QL SMEAR: NORMAL
TOXIC GRANULES BLD QL SMEAR: NORMAL
UROBILINOGEN UR STRIP-MCNC: NORMAL MG/DL
VARIANT LYMPHS BLD QL SMEAR: NORMAL
WBC # BLD AUTO: 5.1 10E3/UL (ref 4–11)
WBC URINE: 3 /HPF

## 2023-11-22 PROCEDURE — 83605 ASSAY OF LACTIC ACID: CPT

## 2023-11-22 PROCEDURE — 96375 TX/PRO/DX INJ NEW DRUG ADDON: CPT | Mod: 59

## 2023-11-22 PROCEDURE — 36415 COLL VENOUS BLD VENIPUNCTURE: CPT

## 2023-11-22 PROCEDURE — 87040 BLOOD CULTURE FOR BACTERIA: CPT

## 2023-11-22 PROCEDURE — 71046 X-RAY EXAM CHEST 2 VIEWS: CPT

## 2023-11-22 PROCEDURE — 70491 CT SOFT TISSUE NECK W/DYE: CPT

## 2023-11-22 PROCEDURE — 87651 STREP A DNA AMP PROBE: CPT

## 2023-11-22 PROCEDURE — 250N000011 HC RX IP 250 OP 636: Mod: JZ

## 2023-11-22 PROCEDURE — 250N000009 HC RX 250: Performed by: EMERGENCY MEDICINE

## 2023-11-22 PROCEDURE — 96374 THER/PROPH/DIAG INJ IV PUSH: CPT | Mod: 59

## 2023-11-22 PROCEDURE — 80048 BASIC METABOLIC PNL TOTAL CA: CPT

## 2023-11-22 PROCEDURE — 258N000003 HC RX IP 258 OP 636

## 2023-11-22 PROCEDURE — 96361 HYDRATE IV INFUSION ADD-ON: CPT

## 2023-11-22 PROCEDURE — 250N000011 HC RX IP 250 OP 636: Performed by: EMERGENCY MEDICINE

## 2023-11-22 PROCEDURE — 87637 SARSCOV2&INF A&B&RSV AMP PRB: CPT

## 2023-11-22 PROCEDURE — 81003 URINALYSIS AUTO W/O SCOPE: CPT

## 2023-11-22 PROCEDURE — 85025 COMPLETE CBC W/AUTO DIFF WBC: CPT

## 2023-11-22 PROCEDURE — 99285 EMERGENCY DEPT VISIT HI MDM: CPT | Mod: 25

## 2023-11-22 PROCEDURE — 96376 TX/PRO/DX INJ SAME DRUG ADON: CPT

## 2023-11-22 RX ORDER — IOPAMIDOL 755 MG/ML
90 INJECTION, SOLUTION INTRAVASCULAR ONCE
Status: COMPLETED | OUTPATIENT
Start: 2023-11-22 | End: 2023-11-22

## 2023-11-22 RX ORDER — HEPARIN SODIUM (PORCINE) LOCK FLUSH IV SOLN 100 UNIT/ML 100 UNIT/ML
500 SOLUTION INTRAVENOUS ONCE
Status: DISCONTINUED | OUTPATIENT
Start: 2023-11-22 | End: 2023-11-22

## 2023-11-22 RX ORDER — ONDANSETRON 2 MG/ML
4 INJECTION INTRAMUSCULAR; INTRAVENOUS EVERY 30 MIN PRN
Status: DISCONTINUED | OUTPATIENT
Start: 2023-11-22 | End: 2023-11-22 | Stop reason: HOSPADM

## 2023-11-22 RX ORDER — KETOROLAC TROMETHAMINE 15 MG/ML
15 INJECTION, SOLUTION INTRAMUSCULAR; INTRAVENOUS ONCE
Status: COMPLETED | OUTPATIENT
Start: 2023-11-22 | End: 2023-11-22

## 2023-11-22 RX ORDER — DEXAMETHASONE SODIUM PHOSPHATE 4 MG/ML
10 INJECTION, SOLUTION INTRA-ARTICULAR; INTRALESIONAL; INTRAMUSCULAR; INTRAVENOUS; SOFT TISSUE ONCE
Status: COMPLETED | OUTPATIENT
Start: 2023-11-22 | End: 2023-11-22

## 2023-11-22 RX ORDER — MORPHINE SULFATE 4 MG/ML
8 INJECTION, SOLUTION INTRAMUSCULAR; INTRAVENOUS ONCE
Status: COMPLETED | OUTPATIENT
Start: 2023-11-22 | End: 2023-11-22

## 2023-11-22 RX ORDER — HEPARIN SODIUM (PORCINE) LOCK FLUSH IV SOLN 100 UNIT/ML 100 UNIT/ML
5-10 SOLUTION INTRAVENOUS
Status: DISCONTINUED | OUTPATIENT
Start: 2023-11-22 | End: 2023-11-22 | Stop reason: HOSPADM

## 2023-11-22 RX ADMIN — SODIUM CHLORIDE 60 ML: 9 INJECTION, SOLUTION INTRAVENOUS at 15:20

## 2023-11-22 RX ADMIN — HEPARIN SODIUM (PORCINE) LOCK FLUSH IV SOLN 100 UNIT/ML 5 ML: 100 SOLUTION at 16:46

## 2023-11-22 RX ADMIN — IOPAMIDOL 90 ML: 755 INJECTION, SOLUTION INTRAVENOUS at 15:20

## 2023-11-22 RX ADMIN — DEXAMETHASONE SODIUM PHOSPHATE 10 MG: 4 INJECTION, SOLUTION INTRAMUSCULAR; INTRAVENOUS at 14:24

## 2023-11-22 RX ADMIN — SODIUM CHLORIDE 1000 ML: 9 INJECTION, SOLUTION INTRAVENOUS at 11:57

## 2023-11-22 RX ADMIN — KETOROLAC TROMETHAMINE 15 MG: 15 INJECTION, SOLUTION INTRAMUSCULAR; INTRAVENOUS at 14:25

## 2023-11-22 RX ADMIN — MORPHINE SULFATE 8 MG: 4 INJECTION, SOLUTION INTRAMUSCULAR; INTRAVENOUS at 15:33

## 2023-11-22 RX ADMIN — HYDROMORPHONE HYDROCHLORIDE 1 MG: 1 INJECTION, SOLUTION INTRAMUSCULAR; INTRAVENOUS; SUBCUTANEOUS at 12:07

## 2023-11-22 RX ADMIN — ONDANSETRON 4 MG: 2 INJECTION INTRAMUSCULAR; INTRAVENOUS at 15:33

## 2023-11-22 RX ADMIN — ONDANSETRON 4 MG: 2 INJECTION INTRAMUSCULAR; INTRAVENOUS at 12:07

## 2023-11-22 ASSESSMENT — ACTIVITIES OF DAILY LIVING (ADL)
ADLS_ACUITY_SCORE: 35
ADLS_ACUITY_SCORE: 35

## 2023-11-22 NOTE — DISCHARGE INSTRUCTIONS
-Follow-up with your oncologist today or tomorrow to discuss ongoing antibiotics and results in the ED  -Return to the ED for fevers, worsening pain, inability to swallow, vomiting, or any other new concerns.

## 2023-11-22 NOTE — TELEPHONE ENCOUNTER
Pt's mom is calling.  See also encounter from yesterday for further details.    Pt was seen in urgent care yesterday with sore throat.  Strep test was negative.    Preston Wiseman NP, also sent in Rx for levaquin for pt, which he started last night.    Pt's mom reports that pt woke up today around 8 am with a fever of 101.2 degrees F.  No shaking chills, shortness of breath, chest pain, or other emergent symptoms.  States that sore throat pain is severe making it difficult to eat or drink.    Pt's mom reports that they are in the car now on the way to the emergency room at [facility].    Will route update to care team.    Connie Molina RN on 11/22/2023 at 9:54 AM

## 2023-11-22 NOTE — ED PROVIDER NOTES
Emergency Department Attending Supervision Note  11/22/2023  11:45 AM      I evaluated this patient in conjunction with an Advanced Practice Clinician:    Briefly, the patient presented with a recent diagnosis of stage III testicular cancer with known metastatic disease to lung.  The patient is being seen by oncology.  He just finished, 1 week ago, his first round of chemotherapy.  Reportedly his white count was a little bit low when embarking on the last round but they proceeded.  The patient is now had a 2-day history of odynophagia/dysphagia, right slightly greater than left.  He does have some mild erythema in the posterior pharynx.  He was started on levofloxacin yesterday and took 1 pill yesterday and 1 pill today.  He had strep testing in urgent care done which was negative.  He did a home COVID test which was negative.  He did not have his viral markers repeated at urgent care.  The patient developed a fever this morning of approximately 101.  He has had decreased p.o. intake this morning secondary to throat pain and nausea.  He is on a number of medications for pain and for antiemetics.  The patient has had some increasing feelings of chest pain, but he notes that he has had chronic chest pain as well.  There is no vomiting today.  No diarrhea.  No dysuria or hematuria.  No significant head pain or neck stiffness.    Examination:   General: Resting uncomfortably on the gurney.  He does wince a bit when he swallows saliva.  Head:  The scalp, face, and head appear normal  Eyes:  The pupils are normal    Conjunctivae and sclera appear normal  ENT:    The nose is normal    Ears/pinnae are normal    The oropharynx reveals erythema to the palatoglossal arches bilaterally.  The tonsils are without exudates.  Overall the tonsils are small.  There is no evidence of peritonsillar abscess.  No evidence of oral thrush.  No evidence of definitive vesicular lesions in the posterior pharynx.  Lungs: Clear    No rales or  wheezing are appreciated.  CV:  Normal  Neck:  Normal range of motion  MS:  Normal  Skin:  No rash or lesions noted.  GI:  The abdomen is nonfocal, nontender, benign, and nonsurgical.  Neuro:  Speech is normal and fluent  Psych: Awake. Alert.  Normal affect.      Appropriate interactions          My impression/diagnosis is:    This patient presents with a new onset sore throat.  He is on chemotherapy for metastatic testicular cancer.  The mets are known to be to the lung.  The patient is on chronic pain medication with MS Contin.  The patient had a detailed physical exam in the emergency department and he had some mild erythema to the posterior pharynx with no evidence of peritonsillar cellulitis, peritonsillar abscess, and no evidence of exudative tonsillopharyngitis.  No obvious vesicles were appreciated.  The tongue lips and anterior mouth were normal.  No evidence of thrush.  The patient underwent laboratory testing which showed no evidence of leukopenia or neutropenia.  There is no evidence of leukocytosis.  Viral testing and strep testing are negative.  This likely represents a viral type etiology.  The patient underwent CT scan of the neck to look for metastatic focus or hidden abscess somewhere in the neck and this was negative.  He just had some mild reactive anterior cervical lymphadenopathy likely from infection.  The patient was given pain control and IV hydration in the emergency department.  No life-threatening etiologies have been detected.  The patient had been given Levaquin empirically, it does not appear that that was treating anything specifically.    MD Alvarado Vickers Michael P, MD  11/22/23 0972

## 2023-11-22 NOTE — ED PROVIDER NOTES
History     Chief Complaint:  Fever and Pharyngitis       HPI   Jayson Banegas is a 28 year old male with a history of stage III testicular cancer who presents for evaluation of a sore throat and fever. The patient states that last week he received his last dose of chemotherapy, although they were considering skipping this due to a low white blood cell count. Two days ago, he developed rhinorrhea and a sore throat, worse on the right side. He was evaluated at urgent care yesterday for this and had a negative strep swab.  He discussed this with his oncologist who prescribed the patient levofloxacin and started this medication yesterday.  He notes that he felt nauseous last night as well, but this is since resolved.  However, today he developed a fever of 101.2 at home and his sore throat became worse with right-sided anterior neck pain.  Patient has chronic pain in his chest and back secondary to pulmonary nodules and states this is also worse than normal.  He denies congestion, ear pain, headache, shortness of breath, cough, abdominal pain, or vomiting.      Independent Historian:   The patient and his mother provide history.    Review of External Notes:   11/8/23: Started chemotherapy on 11/1/2023 scheduled for cycle 2 on 11/27.  Plan to proceed with treatment despite neutropenia and subsequent cycles.    Medications:    acetaminophen (TYLENOL) 325 MG tablet  HYDROmorphone (DILAUDID) 2 MG tablet  levofloxacin (LEVAQUIN) 500 MG tablet  lidocaine-prilocaine (EMLA) 2.5-2.5 % external cream  LORazepam (ATIVAN) 0.5 MG tablet  medical cannabis (Patient's own supply)  morphine (MS CONTIN) 15 MG CR tablet  OLANZapine zydis (ZYPREXA) 5 MG ODT  ondansetron (ZOFRAN) 8 MG tablet  polyethylene glycol (MIRALAX) 17 GM/Dose powder  prochlorperazine (COMPAZINE) 10 MG tablet  senna-docusate (SENOKOT-S/PERICOLACE) 8.6-50 MG tablet  sertraline (ZOLOFT) 100 MG tablet        Past Medical History:    Past Medical History:  "  Diagnosis Date    Alcohol use disorder in remission     Generalized anxiety disorder     MDD (major depressive disorder)     Obesity     PTSD (post-traumatic stress disorder)     Testicular cancer (H)     Tobacco use disorder        Past Surgical History:    Past Surgical History:   Procedure Laterality Date    IR CHEST PORT PLACEMENT > 5 YRS OF AGE  10/27/2023        Physical Exam   Patient Vitals for the past 24 hrs:   BP Temp Temp src Pulse Resp SpO2 Height Weight   11/22/23 1600 109/68 -- -- 90 -- 96 % -- --   11/22/23 1533 -- -- -- -- -- 97 % -- --   11/22/23 1532 105/58 -- -- 87 -- -- -- --   11/22/23 1123 -- -- -- -- -- -- 1.778 m (5' 10\") 91.6 kg (202 lb)   11/22/23 1122 127/80 99.2  F (37.3  C) Temporal 107 18 100 % -- --        Physical Exam  General: Alert, appears well-developed and well-nourished. Cooperative.     In moderate distress.  Occasionally spitting into emesis bag, avoids talking secondary to sore throat.  HEENT:  Head:  Atraumatic  Ears:  External ears are normal.  TMs clear bilaterally.  Mouth/Throat: Erythema posterior pharynx with mild bilateral tonsillar enlargement.  No exudate. Tonsillar pillars symmetric, no uvular deviation. Mucous membranes are moist.   Eyes:   Conjunctivae normal and EOM are normal. No scleral icterus.    Pupils are equal, round, and reactive to light.   Neck:   Normal range of motion. Neck supple.  CV:  Tachycardic, regular rhythm, normal heart sounds and radial and dorsalis pedis pulses are 2+ and symmetric.  No murmur.  Resp:  Breath sounds are clear bilaterally    Non-labored, no retractions or accessory muscle use  GI:  Abdomen is soft, no distension, no tenderness. No rebound or guarding.  MS:  Normal range of motion. No edema.    Normal strength in all 4 extremities.     Back atraumatic.    No midline cervical, thoracic, or lumbar tenderness  Skin:  Warm and dry.  No rash or lesions noted.  Neuro:   Alert. Normal strength.  Sensation intact in all 4 " extremities.   Psych: Normal mood and affect.  Lymph: Anterior cervical lymphadenopathy, TTP over right.     Emergency Department Course   Imaging:  Soft tissue neck CT w contrast   Preliminary Result   IMPRESSION:   1. Mildly enlarged bilateral level 2A lymph nodes are favored to be   reactive.    2. No significant tonsillar swelling, peritonsillar abscess, or neck   mass.         XR Chest 2 Views   Final Result   IMPRESSION: Cardiac silhouette is within normal limits. Right chest   port with distal tip projecting at the lower superior vena cava.   Pulmonary metastatic disease is better demonstrated by prior CT. No   new airspace consolidation. No pleural effusion or pneumothorax.      JASON ZAPIEN MD            SYSTEM ID:  [id number]         Laboratory:  Labs Ordered and Resulted from Time of ED Arrival to Time of ED Departure   ROUTINE UA WITH MICROSCOPIC REFLEX TO CULTURE - Abnormal       Result Value    Color Urine Light Yellow      Appearance Urine Clear      Glucose Urine Negative      Bilirubin Urine Negative      Ketones Urine 20 (*)     Specific Gravity Urine 1.019      Blood Urine Negative      pH Urine 5.5      Protein Albumin Urine Negative      Urobilinogen Urine Normal      Nitrite Urine Negative      Leukocyte Esterase Urine Negative      Mucus Urine Present (*)     RBC Urine <1      WBC Urine 3     BASIC METABOLIC PANEL - Abnormal    Sodium 137      Potassium 4.1      Chloride 102      Carbon Dioxide (CO2) 25      Anion Gap 10      Urea Nitrogen 5.4 (*)     Creatinine 0.76      GFR Estimate >90      Calcium 9.5      Glucose 87     INFLUENZA A/B, RSV, & SARS-COV2 PCR - Normal    Influenza A PCR Negative      Influenza B PCR Negative      RSV PCR Negative      SARS CoV2 PCR Negative     LACTIC ACID WHOLE BLOOD - Normal    Lactic Acid 0.7     GROUP A STREPTOCOCCUS PCR THROAT SWAB - Normal    Group A strep by PCR Not Detected     CBC WITH PLATELETS AND DIFFERENTIAL    WBC Count 5.1      RBC Count  4.75      Hemoglobin 13.5      Hematocrit 40.8      MCV 86      MCH 28.4      MCHC 33.1      RDW 14.3      Platelet Count 172      % Neutrophils 44      % Lymphocytes 30      % Monocytes 25      % Eosinophils 1      % Basophils 0      % Immature Granulocytes 0      NRBCs per 100 WBC 0      Absolute Neutrophils 2.3      Absolute Lymphocytes 1.5      Absolute Monocytes 1.3      Absolute Eosinophils 0.0      Absolute Basophils 0.0      Absolute Immature Granulocytes 0.0      Absolute NRBCs 0.0     RBC AND PLATELET MORPHOLOGY    Platelet Assessment        Value: Automated Count Confirmed. Platelet morphology is normal.    Acanthocytes        Miller Rods        Basophilic Stippling        Bite Cells        Blister Cells        Coldiron Cells        Elliptocytes        Hgb C Crystals        Dawn-Jolly Bodies        Hypersegmented Neutrophils        Polychromasia        RBC agglutination        RBC Fragments        Reactive Lymphocytes        Rouleaux        Sickle Cells        Smudge Cells        Spherocytes        Stomatocytes        Target Cells        Teardrop Cells        Toxic Neutrophils        RBC Morphology Confirmed RBC Indices     BLOOD CULTURE   BLOOD CULTURE        Procedures   None    Emergency Department Course & Assessments:    Interventions:  Medications   ondansetron (ZOFRAN) injection 4 mg (4 mg Intravenous $Given 11/22/23 1533)   sodium chloride 0.9% BOLUS 1,000 mL (0 mLs Intravenous Stopped 11/22/23 1400)   HYDROmorphone (DILAUDID) injection 1 mg (1 mg Intravenous $Given 11/22/23 1207)   morphine (PF) injection 8 mg (8 mg Intravenous $Given 11/22/23 1533)   dexAMETHasone (DECADRON) injection 10 mg (10 mg Intravenous $Given 11/22/23 1424)   ketorolac (TORADOL) injection 15 mg (15 mg Intravenous $Given 11/22/23 1425)   iopamidol (ISOVUE-370) solution 90 mL (90 mLs Intravenous $Given 11/22/23 1520)   100mL Saline Flush (60 mLs Intravenous $Given 11/22/23 1520)        Assessments:  1129: Initial evaluation and  assessment  1610: Patient reassessed, symptoms improved, discussed discharge instructions and follow up recommendations. Return precautions advised. All questions were answered.    Independent Interpretation (X-rays, CTs, rhythm strip):  None    Consultations/Discussion of Management or Tests:  Patient was seen in conjunction with Dr. Childers.     Social Determinants of Health affecting care:   None    Disposition:  The patient was discharged to home.     Impression & Plan    CMS Diagnoses: None    Medical Decision Making:  Jayson Banegas is a 28 year old male with a history of stage III testicular cancer who presents for evaluation of a sore throat and fever.  On exam, the patient has erythema to the posterior pharynx with mild tonsillar enlargement bilaterally without asymmetry of tonsillar pillars or uvular deviation.  He has associated anterior cervical lymphadenopathy.  Patient is tachycardic on arrival without hypotension, fever, or hypoxia.  Tachycardia improved throughout ED course.  The patient recently finished chemotherapy last week and therefore we obtained blood work for further evaluation due to concern for possible neutropenic fever.  Blood work shows no evidence of leukocytosis, neutropenia, or concerning electrolyte abnormalities.  Strep swab is negative.  COVID, influenza, and RSV are also negative.  UA shows no evidence of infection.  Lactate is within normal limits.  Blood cultures are pending.  Chest x-ray shows pulmonary metastatic disease without any new consolidation, pleural effusion, or pneumothorax.  The patient sore throat persisted during ED course and therefore we provided him with IV fluids, Toradol, Decadron, and Dilaudid.  Given his history of metastatic cancer we also proceeded with soft tissue of the neck to rule out any abscesses or new metastatic lesions.  Fortunately, this shows no significant tonsillar swelling, abscess, or masses with mildly enlarged bilateral lymph nodes  favored to be reactive.  Upon reevaluation, the patient's symptoms had improved after interventions.  We explained the findings on lab work and imaging and felt patient was appropriate for discharge home with close oncology follow-up.  He had been prescribed Levaquin previously for sore throat and we recommended he discuss this with his oncologist if he should continue with this course of medication.  We advise he follow-up with them as soon as he is able to reevaluation.  He was advised to return to the ED for fevers, worsening pain, inability to swallow, vomiting, or any other new concerns.  The patient and his mother were in agreement with this plan and all questions were answered.    Diagnosis:    ICD-10-CM    1. Viral pharyngitis  J02.9       2. Anterior cervical lymphadenopathy  R59.0            Lindsey Dangelo PA-C  11/22/2023        Lindsey Dangelo PA-C  11/22/23 1739       Lindsey Dangelo PA-C  11/22/23 1740

## 2023-11-22 NOTE — ED TRIAGE NOTES
Pt brought in by mom for fever of unknown origin and sore throat, went to urgent care yesterday for strep test and was negative and at home covid test was negative too.     Pt has testicular cancer and chemo therapy.

## 2023-11-24 ENCOUNTER — VIRTUAL VISIT (OUTPATIENT)
Dept: ONCOLOGY | Facility: CLINIC | Age: 28
End: 2023-11-24
Attending: PHYSICIAN ASSISTANT
Payer: COMMERCIAL

## 2023-11-24 VITALS — BODY MASS INDEX: 28.92 KG/M2 | HEIGHT: 70 IN | WEIGHT: 202 LBS

## 2023-11-24 DIAGNOSIS — Z51.11 ADMISSION FOR CHEMOTHERAPY: ICD-10-CM

## 2023-11-24 DIAGNOSIS — K59.03 DRUG-INDUCED CONSTIPATION: ICD-10-CM

## 2023-11-24 DIAGNOSIS — C80.1 EMBRYONAL CARCINOMA (H): ICD-10-CM

## 2023-11-24 DIAGNOSIS — G89.3 CANCER ASSOCIATED PAIN: ICD-10-CM

## 2023-11-24 PROCEDURE — 99214 OFFICE O/P EST MOD 30 MIN: CPT | Mod: VID | Performed by: PHYSICIAN ASSISTANT

## 2023-11-24 RX ORDER — ALBUTEROL SULFATE 0.83 MG/ML
2.5 SOLUTION RESPIRATORY (INHALATION)
Status: CANCELLED | OUTPATIENT
Start: 2023-11-29

## 2023-11-24 RX ORDER — PALONOSETRON 0.05 MG/ML
0.25 INJECTION, SOLUTION INTRAVENOUS ONCE
Status: CANCELLED | OUTPATIENT
Start: 2023-11-30

## 2023-11-24 RX ORDER — LORAZEPAM 2 MG/ML
0.5 INJECTION INTRAMUSCULAR EVERY 4 HOURS PRN
Status: CANCELLED | OUTPATIENT
Start: 2023-11-28

## 2023-11-24 RX ORDER — ALBUTEROL SULFATE 90 UG/1
1-2 AEROSOL, METERED RESPIRATORY (INHALATION)
Status: CANCELLED
Start: 2023-11-28

## 2023-11-24 RX ORDER — HEPARIN SODIUM,PORCINE 10 UNIT/ML
5-20 VIAL (ML) INTRAVENOUS DAILY PRN
Status: CANCELLED | OUTPATIENT
Start: 2023-11-27

## 2023-11-24 RX ORDER — MEPERIDINE HYDROCHLORIDE 25 MG/ML
25 INJECTION INTRAMUSCULAR; INTRAVENOUS; SUBCUTANEOUS EVERY 30 MIN PRN
Status: CANCELLED | OUTPATIENT
Start: 2023-12-04

## 2023-11-24 RX ORDER — ALBUTEROL SULFATE 90 UG/1
1-2 AEROSOL, METERED RESPIRATORY (INHALATION)
Status: CANCELLED
Start: 2023-11-27

## 2023-11-24 RX ORDER — LORAZEPAM 2 MG/ML
0.5 INJECTION INTRAMUSCULAR EVERY 4 HOURS PRN
Status: CANCELLED | OUTPATIENT
Start: 2023-12-11

## 2023-11-24 RX ORDER — DIPHENHYDRAMINE HYDROCHLORIDE 50 MG/ML
50 INJECTION INTRAMUSCULAR; INTRAVENOUS
Status: CANCELLED
Start: 2023-11-30

## 2023-11-24 RX ORDER — HEPARIN SODIUM,PORCINE 10 UNIT/ML
5-20 VIAL (ML) INTRAVENOUS DAILY PRN
Status: CANCELLED | OUTPATIENT
Start: 2023-12-04

## 2023-11-24 RX ORDER — ALBUTEROL SULFATE 90 UG/1
1-2 AEROSOL, METERED RESPIRATORY (INHALATION)
Status: CANCELLED
Start: 2023-11-29

## 2023-11-24 RX ORDER — LORAZEPAM 0.5 MG/1
.5-1 TABLET ORAL EVERY 6 HOURS PRN
Qty: 20 TABLET | Refills: 0 | Status: SHIPPED | OUTPATIENT
Start: 2023-11-24 | End: 2023-12-20

## 2023-11-24 RX ORDER — OLANZAPINE 5 MG/1
5 TABLET, ORALLY DISINTEGRATING ORAL AT BEDTIME
Qty: 30 TABLET | Refills: 0 | Status: SHIPPED | OUTPATIENT
Start: 2023-11-24 | End: 2023-12-07

## 2023-11-24 RX ORDER — MEPERIDINE HYDROCHLORIDE 25 MG/ML
25 INJECTION INTRAMUSCULAR; INTRAVENOUS; SUBCUTANEOUS EVERY 30 MIN PRN
Status: CANCELLED | OUTPATIENT
Start: 2023-11-30

## 2023-11-24 RX ORDER — EPINEPHRINE 1 MG/ML
0.3 INJECTION, SOLUTION, CONCENTRATE INTRAVENOUS EVERY 5 MIN PRN
Status: CANCELLED | OUTPATIENT
Start: 2023-11-28

## 2023-11-24 RX ORDER — SODIUM CHLORIDE 9 MG/ML
1000 INJECTION, SOLUTION INTRAVENOUS ONCE
Status: CANCELLED
Start: 2023-11-28

## 2023-11-24 RX ORDER — HEPARIN SODIUM (PORCINE) LOCK FLUSH IV SOLN 100 UNIT/ML 100 UNIT/ML
5 SOLUTION INTRAVENOUS
Status: CANCELLED | OUTPATIENT
Start: 2023-12-04

## 2023-11-24 RX ORDER — HEPARIN SODIUM (PORCINE) LOCK FLUSH IV SOLN 100 UNIT/ML 100 UNIT/ML
5 SOLUTION INTRAVENOUS
Status: CANCELLED | OUTPATIENT
Start: 2023-11-30

## 2023-11-24 RX ORDER — EPINEPHRINE 1 MG/ML
0.3 INJECTION, SOLUTION, CONCENTRATE INTRAVENOUS EVERY 5 MIN PRN
Status: CANCELLED | OUTPATIENT
Start: 2023-11-30

## 2023-11-24 RX ORDER — MEPERIDINE HYDROCHLORIDE 25 MG/ML
25 INJECTION INTRAMUSCULAR; INTRAVENOUS; SUBCUTANEOUS EVERY 30 MIN PRN
Status: CANCELLED | OUTPATIENT
Start: 2023-11-28

## 2023-11-24 RX ORDER — DIPHENHYDRAMINE HYDROCHLORIDE 50 MG/ML
50 INJECTION INTRAMUSCULAR; INTRAVENOUS
Status: CANCELLED
Start: 2023-12-04

## 2023-11-24 RX ORDER — MEPERIDINE HYDROCHLORIDE 25 MG/ML
25 INJECTION INTRAMUSCULAR; INTRAVENOUS; SUBCUTANEOUS EVERY 30 MIN PRN
Status: CANCELLED | OUTPATIENT
Start: 2023-11-27

## 2023-11-24 RX ORDER — HEPARIN SODIUM (PORCINE) LOCK FLUSH IV SOLN 100 UNIT/ML 100 UNIT/ML
5 SOLUTION INTRAVENOUS
Status: CANCELLED | OUTPATIENT
Start: 2023-12-01

## 2023-11-24 RX ORDER — ALBUTEROL SULFATE 0.83 MG/ML
2.5 SOLUTION RESPIRATORY (INHALATION)
Status: CANCELLED | OUTPATIENT
Start: 2023-12-04

## 2023-11-24 RX ORDER — ALBUTEROL SULFATE 90 UG/1
1-2 AEROSOL, METERED RESPIRATORY (INHALATION)
Status: CANCELLED
Start: 2023-12-01

## 2023-11-24 RX ORDER — SODIUM CHLORIDE 9 MG/ML
1000 INJECTION, SOLUTION INTRAVENOUS ONCE
Status: CANCELLED
Start: 2023-11-30

## 2023-11-24 RX ORDER — LORAZEPAM 2 MG/ML
0.5 INJECTION INTRAMUSCULAR EVERY 4 HOURS PRN
Status: CANCELLED | OUTPATIENT
Start: 2023-12-01

## 2023-11-24 RX ORDER — ALBUTEROL SULFATE 0.83 MG/ML
2.5 SOLUTION RESPIRATORY (INHALATION)
Status: CANCELLED | OUTPATIENT
Start: 2023-12-11

## 2023-11-24 RX ORDER — ALBUTEROL SULFATE 90 UG/1
1-2 AEROSOL, METERED RESPIRATORY (INHALATION)
Status: CANCELLED
Start: 2023-12-04

## 2023-11-24 RX ORDER — EPINEPHRINE 1 MG/ML
0.3 INJECTION, SOLUTION, CONCENTRATE INTRAVENOUS EVERY 5 MIN PRN
Status: CANCELLED | OUTPATIENT
Start: 2023-12-11

## 2023-11-24 RX ORDER — HEPARIN SODIUM,PORCINE 10 UNIT/ML
5-20 VIAL (ML) INTRAVENOUS DAILY PRN
Status: CANCELLED | OUTPATIENT
Start: 2023-11-29

## 2023-11-24 RX ORDER — HEPARIN SODIUM,PORCINE 10 UNIT/ML
5-20 VIAL (ML) INTRAVENOUS DAILY PRN
Status: CANCELLED | OUTPATIENT
Start: 2023-11-30

## 2023-11-24 RX ORDER — HEPARIN SODIUM (PORCINE) LOCK FLUSH IV SOLN 100 UNIT/ML 100 UNIT/ML
5 SOLUTION INTRAVENOUS
Status: CANCELLED | OUTPATIENT
Start: 2023-12-11

## 2023-11-24 RX ORDER — EPINEPHRINE 1 MG/ML
0.3 INJECTION, SOLUTION, CONCENTRATE INTRAVENOUS EVERY 5 MIN PRN
Status: CANCELLED | OUTPATIENT
Start: 2023-11-27

## 2023-11-24 RX ORDER — EPINEPHRINE 1 MG/ML
0.3 INJECTION, SOLUTION, CONCENTRATE INTRAVENOUS EVERY 5 MIN PRN
Status: CANCELLED | OUTPATIENT
Start: 2023-12-04

## 2023-11-24 RX ORDER — MEPERIDINE HYDROCHLORIDE 25 MG/ML
25 INJECTION INTRAMUSCULAR; INTRAVENOUS; SUBCUTANEOUS EVERY 30 MIN PRN
Status: CANCELLED | OUTPATIENT
Start: 2023-12-11

## 2023-11-24 RX ORDER — PALONOSETRON 0.05 MG/ML
0.25 INJECTION, SOLUTION INTRAVENOUS ONCE
Status: CANCELLED | OUTPATIENT
Start: 2023-11-27

## 2023-11-24 RX ORDER — SODIUM CHLORIDE 9 MG/ML
1000 INJECTION, SOLUTION INTRAVENOUS ONCE
Status: CANCELLED
Start: 2023-11-29

## 2023-11-24 RX ORDER — HEPARIN SODIUM,PORCINE 10 UNIT/ML
5-20 VIAL (ML) INTRAVENOUS DAILY PRN
Status: CANCELLED | OUTPATIENT
Start: 2023-11-28

## 2023-11-24 RX ORDER — DIPHENHYDRAMINE HYDROCHLORIDE 50 MG/ML
50 INJECTION INTRAMUSCULAR; INTRAVENOUS
Status: CANCELLED
Start: 2023-11-29

## 2023-11-24 RX ORDER — ALBUTEROL SULFATE 0.83 MG/ML
2.5 SOLUTION RESPIRATORY (INHALATION)
Status: CANCELLED | OUTPATIENT
Start: 2023-11-28

## 2023-11-24 RX ORDER — ALBUTEROL SULFATE 90 UG/1
1-2 AEROSOL, METERED RESPIRATORY (INHALATION)
Status: CANCELLED
Start: 2023-11-30

## 2023-11-24 RX ORDER — DIPHENHYDRAMINE HYDROCHLORIDE 50 MG/ML
50 INJECTION INTRAMUSCULAR; INTRAVENOUS
Status: CANCELLED
Start: 2023-12-01

## 2023-11-24 RX ORDER — LORAZEPAM 2 MG/ML
0.5 INJECTION INTRAMUSCULAR EVERY 4 HOURS PRN
Status: CANCELLED | OUTPATIENT
Start: 2023-11-29

## 2023-11-24 RX ORDER — ALBUTEROL SULFATE 0.83 MG/ML
2.5 SOLUTION RESPIRATORY (INHALATION)
Status: CANCELLED | OUTPATIENT
Start: 2023-12-01

## 2023-11-24 RX ORDER — HEPARIN SODIUM (PORCINE) LOCK FLUSH IV SOLN 100 UNIT/ML 100 UNIT/ML
5 SOLUTION INTRAVENOUS
Status: CANCELLED | OUTPATIENT
Start: 2023-11-28

## 2023-11-24 RX ORDER — SODIUM CHLORIDE 9 MG/ML
1000 INJECTION, SOLUTION INTRAVENOUS ONCE
Status: CANCELLED
Start: 2023-11-27

## 2023-11-24 RX ORDER — LORAZEPAM 2 MG/ML
0.5 INJECTION INTRAMUSCULAR EVERY 4 HOURS PRN
Status: CANCELLED | OUTPATIENT
Start: 2023-11-27

## 2023-11-24 RX ORDER — HEPARIN SODIUM,PORCINE 10 UNIT/ML
5-20 VIAL (ML) INTRAVENOUS DAILY PRN
Status: CANCELLED | OUTPATIENT
Start: 2023-12-11

## 2023-11-24 RX ORDER — DIPHENHYDRAMINE HYDROCHLORIDE 50 MG/ML
50 INJECTION INTRAMUSCULAR; INTRAVENOUS
Status: CANCELLED
Start: 2023-11-27

## 2023-11-24 RX ORDER — EPINEPHRINE 1 MG/ML
0.3 INJECTION, SOLUTION, CONCENTRATE INTRAVENOUS EVERY 5 MIN PRN
Status: CANCELLED | OUTPATIENT
Start: 2023-12-01

## 2023-11-24 RX ORDER — HEPARIN SODIUM,PORCINE 10 UNIT/ML
5-20 VIAL (ML) INTRAVENOUS DAILY PRN
Status: CANCELLED | OUTPATIENT
Start: 2023-12-01

## 2023-11-24 RX ORDER — MEPERIDINE HYDROCHLORIDE 25 MG/ML
25 INJECTION INTRAMUSCULAR; INTRAVENOUS; SUBCUTANEOUS EVERY 30 MIN PRN
Status: CANCELLED | OUTPATIENT
Start: 2023-11-29

## 2023-11-24 RX ORDER — LORAZEPAM 2 MG/ML
0.5 INJECTION INTRAMUSCULAR EVERY 4 HOURS PRN
Status: CANCELLED | OUTPATIENT
Start: 2023-11-30

## 2023-11-24 RX ORDER — ALBUTEROL SULFATE 0.83 MG/ML
2.5 SOLUTION RESPIRATORY (INHALATION)
Status: CANCELLED | OUTPATIENT
Start: 2023-11-30

## 2023-11-24 RX ORDER — ALBUTEROL SULFATE 0.83 MG/ML
2.5 SOLUTION RESPIRATORY (INHALATION)
Status: CANCELLED | OUTPATIENT
Start: 2023-11-27

## 2023-11-24 RX ORDER — LORAZEPAM 2 MG/ML
0.5 INJECTION INTRAMUSCULAR EVERY 4 HOURS PRN
Status: CANCELLED | OUTPATIENT
Start: 2023-12-04

## 2023-11-24 RX ORDER — HEPARIN SODIUM (PORCINE) LOCK FLUSH IV SOLN 100 UNIT/ML 100 UNIT/ML
5 SOLUTION INTRAVENOUS
Status: CANCELLED | OUTPATIENT
Start: 2023-11-27

## 2023-11-24 RX ORDER — DIPHENHYDRAMINE HYDROCHLORIDE 50 MG/ML
50 INJECTION INTRAMUSCULAR; INTRAVENOUS
Status: CANCELLED
Start: 2023-12-11

## 2023-11-24 RX ORDER — MEPERIDINE HYDROCHLORIDE 25 MG/ML
25 INJECTION INTRAMUSCULAR; INTRAVENOUS; SUBCUTANEOUS EVERY 30 MIN PRN
Status: CANCELLED | OUTPATIENT
Start: 2023-12-01

## 2023-11-24 RX ORDER — ALBUTEROL SULFATE 90 UG/1
1-2 AEROSOL, METERED RESPIRATORY (INHALATION)
Status: CANCELLED
Start: 2023-12-11

## 2023-11-24 RX ORDER — AMOXICILLIN 250 MG
3-4 CAPSULE ORAL 2 TIMES DAILY
Qty: 100 TABLET | Refills: 0 | Status: SHIPPED | OUTPATIENT
Start: 2023-11-24 | End: 2024-05-01

## 2023-11-24 RX ORDER — SODIUM CHLORIDE 9 MG/ML
1000 INJECTION, SOLUTION INTRAVENOUS ONCE
Status: CANCELLED
Start: 2023-12-01

## 2023-11-24 RX ORDER — HEPARIN SODIUM (PORCINE) LOCK FLUSH IV SOLN 100 UNIT/ML 100 UNIT/ML
5 SOLUTION INTRAVENOUS
Status: CANCELLED | OUTPATIENT
Start: 2023-11-29

## 2023-11-24 RX ORDER — HYDROMORPHONE HYDROCHLORIDE 2 MG/1
2-4 TABLET ORAL EVERY 4 HOURS PRN
Qty: 60 TABLET | Refills: 0 | Status: SHIPPED | OUTPATIENT
Start: 2023-11-24 | End: 2023-12-04

## 2023-11-24 RX ORDER — MORPHINE SULFATE 15 MG/1
15 TABLET, FILM COATED, EXTENDED RELEASE ORAL EVERY 12 HOURS
Qty: 60 TABLET | Refills: 0 | Status: SHIPPED | OUTPATIENT
Start: 2023-11-24 | End: 2024-01-02

## 2023-11-24 RX ORDER — DIPHENHYDRAMINE HYDROCHLORIDE 50 MG/ML
50 INJECTION INTRAMUSCULAR; INTRAVENOUS
Status: CANCELLED
Start: 2023-11-28

## 2023-11-24 RX ORDER — EPINEPHRINE 1 MG/ML
0.3 INJECTION, SOLUTION, CONCENTRATE INTRAVENOUS EVERY 5 MIN PRN
Status: CANCELLED | OUTPATIENT
Start: 2023-11-29

## 2023-11-24 ASSESSMENT — PAIN SCALES - GENERAL: PAINLEVEL: MODERATE PAIN (4)

## 2023-11-24 NOTE — LETTER
"    11/24/2023         RE: Jayson Banegas  [address]  [address]  [place] [address]        Dear Colleague,    Thank you for referring your patient, Jayson Banegas, to the [facility] [place]. Please see a copy of my visit note below.    Virtual Visit Details    Type of service:  Video Visit     Originating Location (pt. Location): {video visit patient location:361026::\"Home\"}  {PROVIDER LOCATION On-site should be selected for visits conducted from your clinic location or adjoining [facility] hospital, academic office, or other nearby [facility] building. Off-site should be selected for all other provider locations, including home:[id number]}  Distant Location (provider location):  {virtual location provider:739987}  Platform used for Video Visit: {Virtual Visit Platforms:389706::\"Point.io\"}    Oncology/Hematology Visit Note  Nov 24, 2023    Reason for Visit: Follow up of Embryonal carcinoma    Primary Oncologist: Dr. Wick    Oncologic History:  Jayson Banegas is a 28 year old male with history of alcoholism and nicotine dependence in remission, depression, anxiety, and PTSD who presents with the following oncologic history:  1. 9/2023: Presented to PCP with left lower quadrant abdominal pain.  Given 2 weeks of antibiotics with no improvement.  2. 9/29/2023: CT abdomen/pelvis with contrast at [facility] showed new 10 mm noncalcified left lower lobe lung nodule; no effusion; diffuse mild/moderate abdominal/pelvic retroperitoneal lymphadenopathy, largest nodes 3.0 x 1.8 cm   retrocrural, 3.0 x 1.8 cm left periaortic, 2.5 x 2.3 cm left   periaortic, 4.2 x 2.2 cm left external iliac, 2.0 x 1.4 cm right internal iliac, 2.7 x 2.2 cm left internal iliac, and 2.8 x 1.7 cm  left inguinal. No right inguinal lymphadenopathy. Multiple small, mildly enlarged mesenteric root lymph nodes. Mild haziness mesenteric root and retroperitoneal fat. Right testicle now located in scrotum; left " testicle not included in field of view; no bone lesions.  3. 9/29/2023: U/S of scrotum with Doppler showed no evidence for intratesticular mass; small benign right epididymal head cyst; small left varicocele. CBC normal except monocytes slightly elevated at 1.1; normal BMP; CRP slightly elevated at 1.04.  4. 10/02/2023: CT chest with contrast showed multiple indeterminate well-circumscribed solid pulmonary nodules in the right lung; 3 mm nodule anterior right upper lobe; 8 mm solid pulmonary nodule in right middle lobe; 6 mm nodule in lateral right lower lobe; additional 10 mm right lower lobe lung nodule; no infiltrates or effusions; unchanged upper abdominal lymphadenopathy.  5. 10/11/2023: CT-guided biopsy of retroperitoneal lymph node at [facility] showed embryonal carcinoma, cannot rule out mixed type. Biopsy of 6 mm of right lung biopsy was negative for malignancy. LDH elevated at 1643. Beta-HCG = 856, AFP = 34. Biopsy specimen sent to [facility] for review of pathology.  6. 10/23/2023: Presented with chest pain.  CT chest at [facility] showed no PE. Multiple pulmonary nodules enlarged since 10/02/2023. Prior right lower lobe lung nodule 7 mm, now 13 mm, prior 8 mm right middle lobe nodule now 12 mm; prior 6 mm right lower lobe lung nodule now 12 mm; new 2 mm right lower lobe lung nodule; enlarging left upper quadrant mass arising from or engulfing left adrenal gland 2.2 x 4.6 cm; upper abdominal lymphadenopathy have enlarged.  7. 10/26/2023: PET/CT scan showed multiple hypermetabolic pulmonary nodules most likely reflecting   metastatic disease. Abnormal hypermetabolic left retrocrural lymph node. Otherwise no abnormal adenopathy in the neck or chest. Diffuse hypermetabolic lymphadenopathy throughout the retroperitoneum, mesentery, and bilateral iliac chains left greater   than right as well as in the left groin region.      Interval History:  Doing well today. Was seen in ED 2 days ago with  "severely painful pharyngitis. Received pain meds, steroids, and continued Levaquin. ID work-up negative. He is doing better now, able to swallow. Some improvement in dyspnea and pain. Some nausea, responsive to antiemetics.     Review of Systems:  A complete review of systems was negative except as noted in the HPI.     Past medical, Surgical, and social history:  Reviewed    Physical Examination:  Ht 1.778 m (5' 10\")   Wt 91.6 kg (202 lb)   BMI 28.98 kg/m    Wt Readings from Last 10 Encounters:   11/24/23 91.6 kg (202 lb)   11/22/23 91.6 kg (202 lb)   11/16/23 92.1 kg (203 lb)   11/15/23 92.1 kg (203 lb)   11/08/23 89.8 kg (198 lb)   11/08/23 89.8 kg (198 lb)   11/01/23 91 kg (200 lb 9.9 oz)   10/31/23 92.5 kg (204 lb)   10/27/23 92.1 kg (203 lb)   10/24/23 92.5 kg (204 lb)     Video Visit - Exam Limited  General: Pleasant patient in no acute distress. Anicteric and no jaundice. No obvious skin abnormalities or bleeding. Normal work of breathing. Alert and oriented. Cranial nerves grossly intact.     Laboratory Data:  Will obtain on Monday with chemo, but labs from ED 11/22 reviewed, no neutropenia.      Assessment and Plan:  Jayson Banegas is a 28 year old male with the following issues:    1. Embryonal carcinoma, clinical stage IIIB, [id number], S2 (pre-orchiectomy), intermediate risk  2. Probable metastases to bilateral lungs  3. Diffuse abdominal, pelvic, and retroperitoneal lymphadenopathy  4. Fertility preservation  --Continue BEP chemotherpy with plan for 4 if tolerated  --Okay to proceed with cycle 2 Monday pending labs  --No growth factor indicated with this situation  --Repeat CT planned following cycle 2 - if persistent nodes > 2 cm will refer back to urology for consideration of RPLND   --May consider omission of bleomycin with cycle 4 pending tolerance and response  --No plan for orchiectomy per urology as no involvement  --Overall good prognosis, 5-year overall survival around 75%    5. " Pharyngitis  --Seen in ED 11/22 with severe pharyngitis - ID testing without specific agent, improved with IV steroids and pain meds  --Due to prior cytopenia and fever, currently completing Augment/Levaquin course    6. Pain Cancer Related  --Follows with Dr. Monahan  --Refills provided today  --He does have history or AUD so caution    25 minutes spent on the date of the encounter doing chart review, review of test results, interpretation of tests, patient visit, documentation, and discussion with other provider(s)     Zeina Verdin PA-C  [facility]   [phone]        Again, thank you for allowing me to participate in the care of your patient.        Sincerely,        ZEINA VERDIN PA-C

## 2023-11-24 NOTE — LETTER
"    11/24/2023         RE: Jayson Banegas  [address]  [address]  [facility] [address]        Dear Colleague,    Thank you for referring your patient, Jayson Banegas, to the [facility] [place]. Please see a copy of my visit note below.    Virtual Visit Details    Type of service:  Video Visit     Originating Location (pt. Location): {video visit patient location:917596::\"Home\"}  {PROVIDER LOCATION On-site should be selected for visits conducted from your clinic location or adjoining [facility] hospital, academic office, or other nearby [facility] building. Off-site should be selected for all other provider locations, including home:14[address]}  Distant Location (provider location):  {virtual location provider:511778}  Platform used for Video Visit: {Virtual Visit Platforms:771512::\"Evoleen\"}    Oncology/Hematology Visit Note  Nov 24, 2023    Reason for Visit: Follow up of Embryonal carcinoma    Primary Oncologist: Dr. Wick    Oncologic History:  Jayson Banegas is a 28 year old male with history of alcoholism and nicotine dependence in remission, depression, anxiety, and PTSD who presents with the following oncologic history:  1. 9/2023: Presented to PCP with left lower quadrant abdominal pain.  Given 2 weeks of antibiotics with no improvement.  2. 9/29/2023: CT abdomen/pelvis with contrast at [facility] showed new 10 mm noncalcified left lower lobe lung nodule; no effusion; diffuse mild/moderate abdominal/pelvic retroperitoneal lymphadenopathy, largest nodes 3.0 x 1.8 cm   retrocrural, 3.0 x 1.8 cm left periaortic, 2.5 x 2.3 cm left   periaortic, 4.2 x 2.2 cm left external iliac, 2.0 x 1.4 cm right internal iliac, 2.7 x 2.2 cm left internal iliac, and 2.8 x 1.7 cm  left inguinal. No right inguinal lymphadenopathy. Multiple small, mildly enlarged mesenteric root lymph nodes. Mild haziness mesenteric root and retroperitoneal fat. Right testicle now located in scrotum; left " testicle not included in field of view; no bone lesions.  3. 9/29/2023: U/S of scrotum with Doppler showed no evidence for intratesticular mass; small benign right epididymal head cyst; small left varicocele. CBC normal except monocytes slightly elevated at 1.1; normal BMP; CRP slightly elevated at 1.04.  4. 10/02/2023: CT chest with contrast showed multiple indeterminate well-circumscribed solid pulmonary nodules in the right lung; 3 mm nodule anterior right upper lobe; 8 mm solid pulmonary nodule in right middle lobe; 6 mm nodule in lateral right lower lobe; additional 10 mm right lower lobe lung nodule; no infiltrates or effusions; unchanged upper abdominal lymphadenopathy.  5. 10/11/2023: CT-guided biopsy of retroperitoneal lymph node at [facility] showed embryonal carcinoma, cannot rule out mixed type. Biopsy of 6 mm of right lung biopsy was negative for malignancy. LDH elevated at 1643. Beta-HCG = 856, AFP = 34. Biopsy specimen sent to [facility] for review of pathology.  6. 10/23/2023: Presented with chest pain.  CT chest at [facility] showed no PE. Multiple pulmonary nodules enlarged since 10/02/2023. Prior right lower lobe lung nodule 7 mm, now 13 mm, prior 8 mm right middle lobe nodule now 12 mm; prior 6 mm right lower lobe lung nodule now 12 mm; new 2 mm right lower lobe lung nodule; enlarging left upper quadrant mass arising from or engulfing left adrenal gland 2.2 x 4.6 cm; upper abdominal lymphadenopathy have enlarged.  7. 10/26/2023: PET/CT scan showed multiple hypermetabolic pulmonary nodules most likely reflecting   metastatic disease. Abnormal hypermetabolic left retrocrural lymph node. Otherwise no abnormal adenopathy in the neck or chest. Diffuse hypermetabolic lymphadenopathy throughout the retroperitoneum, mesentery, and bilateral iliac chains left greater   than right as well as in the left groin region.      Interval History:  Doing well today. Was seen in ED 2 days ago with  "severely painful pharyngitis. Received pain meds, steroids, and continued Levaquin. ID work-up negative. He is doing better now, able to swallow. Some improvement in dyspnea and pain. Some nausea, responsive to antiemetics.     Review of Systems:  A complete review of systems was negative except as noted in the HPI.     Past medical, Surgical, and social history:  Reviewed    Physical Examination:  Ht 1.778 m (5' 10\")   Wt 91.6 kg (202 lb)   BMI 28.98 kg/m    Wt Readings from Last 10 Encounters:   11/24/23 91.6 kg (202 lb)   11/22/23 91.6 kg (202 lb)   11/16/23 92.1 kg (203 lb)   11/15/23 92.1 kg (203 lb)   11/08/23 89.8 kg (198 lb)   11/08/23 89.8 kg (198 lb)   11/01/23 91 kg (200 lb 9.9 oz)   10/31/23 92.5 kg (204 lb)   10/27/23 92.1 kg (203 lb)   10/24/23 92.5 kg (204 lb)     Video Visit - Exam Limited  General: Pleasant patient in no acute distress. Anicteric and no jaundice. No obvious skin abnormalities or bleeding. Normal work of breathing. Alert and oriented. Cranial nerves grossly intact.     Laboratory Data:  Will obtain on Monday with chemo, but labs from ED 11/22 reviewed, no neutropenia.      Assessment and Plan:  Jayson Banegas is a 28 year old male with the following issues:    1. Embryonal carcinoma, clinical stage IIIB, [id number], S2 (pre-orchiectomy), intermediate risk  2. Probable metastases to bilateral lungs  3. Diffuse abdominal, pelvic, and retroperitoneal lymphadenopathy  4. Fertility preservation  --Continue BEP chemotherpy with plan for 4 if tolerated  --Okay to proceed with cycle 2 Monday pending labs  --No growth factor indicated with this situation  --Repeat CT planned following cycle 2 - if persistent nodes > 2 cm will refer back to urology for consideration of RPLND   --May consider omission of bleomycin with cycle 4 pending tolerance and response  --No plan for orchiectomy per urology as no involvement  --Overall good prognosis, 5-year overall survival around 75%    5. " Pharyngitis  --Seen in ED 11/22 with severe pharyngitis - ID testing without specific agent, improved with IV steroids and pain meds  --Due to prior cytopenia and fever, currently completing Augment/Levaquin course    6. Pain Cancer Related  --Follows with Dr. Monahan  --Refills provided today  --He does have history or AUD so caution    25 minutes spent on the date of the encounter doing chart review, review of test results, interpretation of tests, patient visit, documentation, and discussion with other provider(s)     Zeina Verdin PA-C  [facility]   [phone]        Again, thank you for allowing me to participate in the care of your patient.        Sincerely,        ZEINA VERDIN PA-C

## 2023-11-24 NOTE — PROGRESS NOTES
Virtual Visit Details    Type of service:  Video Visit     Originating Location (pt. Location): Home    Distant Location (provider location):  On-site  Platform used for Video Visit: Olinda

## 2023-11-24 NOTE — NURSING NOTE
Is the patient currently in the state of [place]? YES    Visit mode:VIDEO    If the visit is dropped, the patient can be reconnected by: VIDEO VISIT: Text to cell phone:   Telephone Information:   Mobile [phone]       Will anyone else be joining the visit? NO  (If patient encounters technical issues they should call [phone]    How would you like to obtain your AVS? MyChart    Are changes needed to the allergy or medication list?  Pt states his allergies and medications were up-to-date during his echeck-in.    Reason for visit: RECHECK    Domingo HERNANDEZ

## 2023-11-24 NOTE — PROGRESS NOTES
Oncology/Hematology Visit Note  Nov 24, 2023    Reason for Visit: Follow up of Embryonal carcinoma    Primary Oncologist: Dr. Wick    Oncologic History:  Jayson Banegas is a 28 year old male with history of alcoholism and nicotine dependence in remission, depression, anxiety, and PTSD who presents with the following oncologic history:  1. 9/2023: Presented to PCP with left lower quadrant abdominal pain.  Given 2 weeks of antibiotics with no improvement.  2. 9/29/2023: CT abdomen/pelvis with contrast at [facility] showed new 10 mm noncalcified left lower lobe lung nodule; no effusion; diffuse mild/moderate abdominal/pelvic retroperitoneal lymphadenopathy, largest nodes 3.0 x 1.8 cm   retrocrural, 3.0 x 1.8 cm left periaortic, 2.5 x 2.3 cm left   periaortic, 4.2 x 2.2 cm left external iliac, 2.0 x 1.4 cm right internal iliac, 2.7 x 2.2 cm left internal iliac, and 2.8 x 1.7 cm  left inguinal. No right inguinal lymphadenopathy. Multiple small, mildly enlarged mesenteric root lymph nodes. Mild haziness mesenteric root and retroperitoneal fat. Right testicle now located in scrotum; left testicle not included in field of view; no bone lesions.  3. 9/29/2023: U/S of scrotum with Doppler showed no evidence for intratesticular mass; small benign right epididymal head cyst; small left varicocele. CBC normal except monocytes slightly elevated at 1.1; normal BMP; CRP slightly elevated at 1.04.  4. 10/02/2023: CT chest with contrast showed multiple indeterminate well-circumscribed solid pulmonary nodules in the right lung; 3 mm nodule anterior right upper lobe; 8 mm solid pulmonary nodule in right middle lobe; 6 mm nodule in lateral right lower lobe; additional 10 mm right lower lobe lung nodule; no infiltrates or effusions; unchanged upper abdominal lymphadenopathy.  5. 10/11/2023: CT-guided biopsy of retroperitoneal lymph node at [facility] showed embryonal carcinoma, cannot rule out mixed type. Biopsy  "of 6 mm of right lung biopsy was negative for malignancy. LDH elevated at 1643. Beta-HCG = 856, AFP = 34. Biopsy specimen sent to [facility] for review of pathology.  6. 10/23/2023: Presented with chest pain.  CT chest at [facility] showed no PE. Multiple pulmonary nodules enlarged since 10/02/2023. Prior right lower lobe lung nodule 7 mm, now 13 mm, prior 8 mm right middle lobe nodule now 12 mm; prior 6 mm right lower lobe lung nodule now 12 mm; new 2 mm right lower lobe lung nodule; enlarging left upper quadrant mass arising from or engulfing left adrenal gland 2.2 x 4.6 cm; upper abdominal lymphadenopathy have enlarged.  7. 10/26/2023: PET/CT scan showed multiple hypermetabolic pulmonary nodules most likely reflecting   metastatic disease. Abnormal hypermetabolic left retrocrural lymph node. Otherwise no abnormal adenopathy in the neck or chest. Diffuse hypermetabolic lymphadenopathy throughout the retroperitoneum, mesentery, and bilateral iliac chains left greater   than right as well as in the left groin region.      Interval History:  Doing well today. Was seen in ED 2 days ago with severely painful pharyngitis. Received pain meds, steroids, and continued Levaquin. ID work-up negative. He is doing better now, able to swallow. Some improvement in dyspnea and pain. Some nausea, responsive to antiemetics.     Review of Systems:  A complete review of systems was negative except as noted in the HPI.     Past medical, Surgical, and social history:  Reviewed    Physical Examination:  Ht 1.778 m (5' 10\")   Wt 91.6 kg (202 lb)   BMI 28.98 kg/m    Wt Readings from Last 10 Encounters:   11/24/23 91.6 kg (202 lb)   11/22/23 91.6 kg (202 lb)   11/16/23 92.1 kg (203 lb)   11/15/23 92.1 kg (203 lb)   11/08/23 89.8 kg (198 lb)   11/08/23 89.8 kg (198 lb)   11/01/23 91 kg (200 lb 9.9 oz)   10/31/23 92.5 kg (204 lb)   10/27/23 92.1 kg (203 lb)   10/24/23 92.5 kg (204 lb)     Video Visit - Exam Limited  General: " Pleasant patient in no acute distress. Anicteric and no jaundice. No obvious skin abnormalities or bleeding. Normal work of breathing. Alert and oriented. Cranial nerves grossly intact.     Laboratory Data:  Will obtain on Monday with chemo, but labs from ED 11/22 reviewed, no neutropenia.      Assessment and Plan:  Jayson Banegas is a 28 year old male with the following issues:    1. Embryonal carcinoma, clinical stage IIIB, [id number], S2 (pre-orchiectomy), intermediate risk  2. Probable metastases to bilateral lungs  3. Diffuse abdominal, pelvic, and retroperitoneal lymphadenopathy  4. Fertility preservation  --Continue BEP chemotherpy with plan for 4 if tolerated  --Okay to proceed with cycle 2 Monday pending labs  --No growth factor indicated with this situation  --Repeat CT planned following cycle 2 - if persistent nodes > 2 cm will refer back to urology for consideration of RPLND   --May consider omission of bleomycin with cycle 4 pending tolerance and response  --No plan for orchiectomy per urology as no involvement  --Overall good prognosis, 5-year overall survival around 75%    5. Pharyngitis  --Seen in ED 11/22 with severe pharyngitis - ID testing without specific agent, improved with IV steroids and pain meds  --Due to prior cytopenia and fever, currently completing Augment/Levaquin course    6. Pain Cancer Related  7. Nausea  --Follows with Dr. Monahan  --Refills provided today  --He does have history or AUD so caution  --Continue aggressive bowel regimen    25 minutes spent on the date of the encounter doing chart review, review of test results, interpretation of tests, patient visit, documentation, and discussion with other provider(s)     Zeina Verdin PA-C  [facility]   [phone]

## 2023-11-27 ENCOUNTER — INFUSION THERAPY VISIT (OUTPATIENT)
Dept: INFUSION THERAPY | Facility: CLINIC | Age: 28
End: 2023-11-27
Attending: INTERNAL MEDICINE
Payer: COMMERCIAL

## 2023-11-27 VITALS
HEART RATE: 59 BPM | TEMPERATURE: 98.6 F | SYSTOLIC BLOOD PRESSURE: 131 MMHG | OXYGEN SATURATION: 95 % | DIASTOLIC BLOOD PRESSURE: 73 MMHG | WEIGHT: 201 LBS | BODY MASS INDEX: 28.77 KG/M2 | RESPIRATION RATE: 18 BRPM | HEIGHT: 70 IN

## 2023-11-27 DIAGNOSIS — C80.1 EMBRYONAL CARCINOMA (H): Primary | ICD-10-CM

## 2023-11-27 LAB
BACTERIA BLD CULT: NO GROWTH
BACTERIA BLD CULT: NO GROWTH

## 2023-11-27 PROCEDURE — 96367 TX/PROPH/DG ADDL SEQ IV INF: CPT

## 2023-11-27 PROCEDURE — 36593 DECLOT VASCULAR DEVICE: CPT

## 2023-11-27 PROCEDURE — 96417 CHEMO IV INFUS EACH ADDL SEQ: CPT

## 2023-11-27 PROCEDURE — 96375 TX/PRO/DX INJ NEW DRUG ADDON: CPT

## 2023-11-27 PROCEDURE — 258N000003 HC RX IP 258 OP 636: Performed by: INTERNAL MEDICINE

## 2023-11-27 PROCEDURE — 250N000011 HC RX IP 250 OP 636: Mod: JZ | Performed by: INTERNAL MEDICINE

## 2023-11-27 PROCEDURE — 96413 CHEMO IV INFUSION 1 HR: CPT

## 2023-11-27 RX ORDER — HEPARIN SODIUM (PORCINE) LOCK FLUSH IV SOLN 100 UNIT/ML 100 UNIT/ML
5 SOLUTION INTRAVENOUS
Status: DISCONTINUED | OUTPATIENT
Start: 2023-11-27 | End: 2023-11-27 | Stop reason: HOSPADM

## 2023-11-27 RX ORDER — SODIUM CHLORIDE 9 MG/ML
1000 INJECTION, SOLUTION INTRAVENOUS ONCE
Status: COMPLETED | OUTPATIENT
Start: 2023-11-27 | End: 2023-11-27

## 2023-11-27 RX ORDER — PALONOSETRON 0.05 MG/ML
0.25 INJECTION, SOLUTION INTRAVENOUS ONCE
Status: COMPLETED | OUTPATIENT
Start: 2023-11-27 | End: 2023-11-27

## 2023-11-27 RX ADMIN — ETOPOSIDE 210 MG: 20 INJECTION INTRAVENOUS at 09:40

## 2023-11-27 RX ADMIN — Medication 5 ML: at 12:22

## 2023-11-27 RX ADMIN — ALTEPLASE 2 MG: 2.2 INJECTION, POWDER, LYOPHILIZED, FOR SOLUTION INTRAVENOUS at 08:50

## 2023-11-27 RX ADMIN — PALONOSETRON HYDROCHLORIDE 0.25 MG: 0.25 INJECTION INTRAVENOUS at 09:05

## 2023-11-27 RX ADMIN — BLEOMYCIN SULFATE 30 UNITS: 30 INJECTION, POWDER, LYOPHILIZED, FOR SOLUTION INTRAMUSCULAR; INTRAPLEURAL; INTRAVENOUS; SUBCUTANEOUS at 11:58

## 2023-11-27 RX ADMIN — CISPLATIN 40 MG: 1 INJECTION, SOLUTION INTRAVENOUS at 10:56

## 2023-11-27 RX ADMIN — SODIUM CHLORIDE 1000 ML: 9 INJECTION, SOLUTION INTRAVENOUS at 08:50

## 2023-11-27 RX ADMIN — FOSAPREPITANT: 150 INJECTION, POWDER, LYOPHILIZED, FOR SOLUTION INTRAVENOUS at 09:07

## 2023-11-27 NOTE — PROGRESS NOTES
Infusion Nursing Note:  Jayson Banegas presents today for etoposide, cisplatin, bleo.    Patient seen by provider: Yes: Zeina Friday, denies changes since then   present during visit today: Not Applicable.    Note: N/A.      Intravenous Access:  Peripheral IV placed. gave  Implanted Port. Needed TPA.     Treatment Conditions:  Results reviewed, labs MET treatment parameters, ok to proceed with treatment.      Post Infusion Assessment:  Patient tolerated infusion without incident.  Blood return noted pre and post infusion.  Site patent and intact, free from redness, edema or discomfort.  No evidence of extravasations.  Access discontinued per protocol.       Discharge Plan:   Discharge instructions reviewed with: Patient and Family.  Patient and/or family verbalized understanding of discharge instructions and all questions answered.  Copy of AVS reviewed with patient and/or family.  Patient will return tomorrow for next appointment.  Patient discharged in stable condition accompanied by: self and family.  Departure Mode: Ambulatory.      Rebeka Ramirez RN

## 2023-11-28 ENCOUNTER — INFUSION THERAPY VISIT (OUTPATIENT)
Dept: INFUSION THERAPY | Facility: CLINIC | Age: 28
End: 2023-11-28
Attending: INTERNAL MEDICINE
Payer: COMMERCIAL

## 2023-11-28 VITALS
TEMPERATURE: 97.7 F | SYSTOLIC BLOOD PRESSURE: 132 MMHG | HEART RATE: 101 BPM | RESPIRATION RATE: 20 BRPM | DIASTOLIC BLOOD PRESSURE: 80 MMHG | OXYGEN SATURATION: 99 %

## 2023-11-28 DIAGNOSIS — C80.1 EMBRYONAL CARCINOMA (H): Primary | ICD-10-CM

## 2023-11-28 PROCEDURE — 250N000011 HC RX IP 250 OP 636: Performed by: INTERNAL MEDICINE

## 2023-11-28 PROCEDURE — 96361 HYDRATE IV INFUSION ADD-ON: CPT

## 2023-11-28 PROCEDURE — 96367 TX/PROPH/DG ADDL SEQ IV INF: CPT

## 2023-11-28 PROCEDURE — 96417 CHEMO IV INFUS EACH ADDL SEQ: CPT

## 2023-11-28 PROCEDURE — 96413 CHEMO IV INFUSION 1 HR: CPT

## 2023-11-28 PROCEDURE — 258N000003 HC RX IP 258 OP 636: Performed by: INTERNAL MEDICINE

## 2023-11-28 RX ORDER — HEPARIN SODIUM,PORCINE 10 UNIT/ML
5-20 VIAL (ML) INTRAVENOUS DAILY PRN
Status: DISCONTINUED | OUTPATIENT
Start: 2023-11-28 | End: 2023-11-28 | Stop reason: HOSPADM

## 2023-11-28 RX ORDER — SODIUM CHLORIDE 9 MG/ML
1000 INJECTION, SOLUTION INTRAVENOUS ONCE
Status: COMPLETED | OUTPATIENT
Start: 2023-11-28 | End: 2023-11-28

## 2023-11-28 RX ORDER — HEPARIN SODIUM (PORCINE) LOCK FLUSH IV SOLN 100 UNIT/ML 100 UNIT/ML
5 SOLUTION INTRAVENOUS
Status: DISCONTINUED | OUTPATIENT
Start: 2023-11-28 | End: 2023-11-28 | Stop reason: HOSPADM

## 2023-11-28 RX ADMIN — HEPARIN, PORCINE (PF) 10 UNIT/ML INTRAVENOUS SYRINGE 5 ML: at 11:08

## 2023-11-28 RX ADMIN — CISPLATIN 40 MG: 1 INJECTION, SOLUTION INTRAVENOUS at 10:52

## 2023-11-28 RX ADMIN — ETOPOSIDE 210 MG: 20 INJECTION INTRAVENOUS at 09:49

## 2023-11-28 RX ADMIN — SODIUM CHLORIDE 1000 ML: 9 INJECTION, SOLUTION INTRAVENOUS at 08:36

## 2023-11-28 RX ADMIN — DEXAMETHASONE SODIUM PHOSPHATE 12 MG: 10 INJECTION, SOLUTION INTRAMUSCULAR; INTRAVENOUS at 09:31

## 2023-11-28 NOTE — PROGRESS NOTES
Infusion Nursing Note:  Jayson Banegas presents today for C2D2 Toposar/Cisplatin.    Patient seen by provider today: No   present during visit today: Not Applicable.    Note: Heart Rate elevated but regular, consulted PATRICK Sorenson and pharmacy, most likely from Dexamethasone.      Intravenous Access:  Implanted Port.    Treatment Conditions:  Not Applicable.      Post Infusion Assessment:  Patient tolerated infusion without incident.  Blood return noted pre and post infusion.  Site patent and intact, free from redness, edema or discomfort.  No evidence of extravasations.       Discharge Plan:   Patient declined prescription refills.  Discharge instructions reviewed with: Patient.  Patient and/or family verbalized understanding of discharge instructions and all questions answered.  AVS to patient via CraigsBlueBookHART.  Patient will return 11/29 for next appointment.   Patient discharged in stable condition accompanied by: self and mother.  Departure Mode: Ambulatory.      Tomás Velez RN

## 2023-11-29 ENCOUNTER — INFUSION THERAPY VISIT (OUTPATIENT)
Dept: INFUSION THERAPY | Facility: CLINIC | Age: 28
End: 2023-11-29
Attending: INTERNAL MEDICINE
Payer: COMMERCIAL

## 2023-11-29 VITALS
OXYGEN SATURATION: 98 % | RESPIRATION RATE: 18 BRPM | TEMPERATURE: 97.6 F | HEART RATE: 83 BPM | DIASTOLIC BLOOD PRESSURE: 85 MMHG | SYSTOLIC BLOOD PRESSURE: 130 MMHG

## 2023-11-29 DIAGNOSIS — C80.1 EMBRYONAL CARCINOMA (H): Primary | ICD-10-CM

## 2023-11-29 PROCEDURE — 96361 HYDRATE IV INFUSION ADD-ON: CPT

## 2023-11-29 PROCEDURE — 258N000003 HC RX IP 258 OP 636: Performed by: INTERNAL MEDICINE

## 2023-11-29 PROCEDURE — 250N000011 HC RX IP 250 OP 636: Performed by: INTERNAL MEDICINE

## 2023-11-29 PROCEDURE — 96413 CHEMO IV INFUSION 1 HR: CPT

## 2023-11-29 PROCEDURE — 96417 CHEMO IV INFUS EACH ADDL SEQ: CPT

## 2023-11-29 PROCEDURE — 96367 TX/PROPH/DG ADDL SEQ IV INF: CPT

## 2023-11-29 RX ORDER — HEPARIN SODIUM,PORCINE 10 UNIT/ML
5-20 VIAL (ML) INTRAVENOUS DAILY PRN
Status: DISCONTINUED | OUTPATIENT
Start: 2023-11-29 | End: 2023-11-29 | Stop reason: HOSPADM

## 2023-11-29 RX ORDER — SODIUM CHLORIDE 9 MG/ML
1000 INJECTION, SOLUTION INTRAVENOUS ONCE
Status: COMPLETED | OUTPATIENT
Start: 2023-11-29 | End: 2023-11-29

## 2023-11-29 RX ADMIN — SODIUM CHLORIDE, PRESERVATIVE FREE 5 ML: 5 INJECTION INTRAVENOUS at 12:32

## 2023-11-29 RX ADMIN — SODIUM CHLORIDE 1000 ML: 9 INJECTION, SOLUTION INTRAVENOUS at 09:11

## 2023-11-29 RX ADMIN — ETOPOSIDE 210 MG: 20 INJECTION INTRAVENOUS at 10:04

## 2023-11-29 RX ADMIN — DEXAMETHASONE SODIUM PHOSPHATE 12 MG: 10 INJECTION, SOLUTION INTRAMUSCULAR; INTRAVENOUS at 09:45

## 2023-11-29 RX ADMIN — CISPLATIN 40 MG: 1 INJECTION, SOLUTION INTRAVENOUS at 11:19

## 2023-11-29 NOTE — PROGRESS NOTES
Infusion Nursing Note:  Jayson Banegas presents today for C2D3 etoposide and cisplatin.    Patient seen by provider today: No   present during visit today: Not Applicable.    Note: 15 minutes into cisplatin patient accidentally pulled port needle out, minimal cisplatin on his tshirt. Patient washed area with soap and water, re accessed port with good blood return.      Intravenous Access:  Implanted Port.    Treatment Conditions:  Lab Results   Component Value Date    HGB 13.5 11/22/2023    WBC 5.1 11/22/2023    ANEUTAUTO 2.3 11/22/2023     11/22/2023        Lab Results   Component Value Date     11/22/2023    POTASSIUM 4.1 11/22/2023    MAG 2.1 11/08/2023    CR 0.76 11/22/2023    ALEX 9.5 11/22/2023    BILITOTAL 0.3 11/01/2023    ALBUMIN 4.0 11/01/2023    ALT 23 11/01/2023    AST 35 11/01/2023       Results reviewed, labs MET treatment parameters, ok to proceed with treatment.      Post Infusion Assessment:  Patient tolerated infusion without incident.  Blood return noted pre and post infusion.  Site patent and intact, free from redness, edema or discomfort.  No evidence of extravasations.       Discharge Plan:   AVS to patient via MYCHART.  Patient will return 11/30 for next appointment.   Patient discharged in stable condition accompanied by: mother.  Departure Mode: Ambulatory.      Shahid Dickey RN

## 2023-11-30 ENCOUNTER — INFUSION THERAPY VISIT (OUTPATIENT)
Dept: INFUSION THERAPY | Facility: CLINIC | Age: 28
End: 2023-11-30
Attending: INTERNAL MEDICINE
Payer: COMMERCIAL

## 2023-11-30 VITALS
HEART RATE: 69 BPM | DIASTOLIC BLOOD PRESSURE: 80 MMHG | OXYGEN SATURATION: 97 % | RESPIRATION RATE: 20 BRPM | SYSTOLIC BLOOD PRESSURE: 125 MMHG | TEMPERATURE: 97.8 F

## 2023-11-30 DIAGNOSIS — C80.1 EMBRYONAL CARCINOMA (H): Primary | ICD-10-CM

## 2023-11-30 PROCEDURE — 96417 CHEMO IV INFUS EACH ADDL SEQ: CPT

## 2023-11-30 PROCEDURE — 96413 CHEMO IV INFUSION 1 HR: CPT

## 2023-11-30 PROCEDURE — 250N000011 HC RX IP 250 OP 636: Mod: JZ | Performed by: INTERNAL MEDICINE

## 2023-11-30 PROCEDURE — 96375 TX/PRO/DX INJ NEW DRUG ADDON: CPT

## 2023-11-30 PROCEDURE — 258N000003 HC RX IP 258 OP 636: Performed by: INTERNAL MEDICINE

## 2023-11-30 PROCEDURE — 96367 TX/PROPH/DG ADDL SEQ IV INF: CPT

## 2023-11-30 RX ORDER — PALONOSETRON 0.05 MG/ML
0.25 INJECTION, SOLUTION INTRAVENOUS ONCE
Status: COMPLETED | OUTPATIENT
Start: 2023-11-30 | End: 2023-11-30

## 2023-11-30 RX ORDER — SODIUM CHLORIDE 9 MG/ML
1000 INJECTION, SOLUTION INTRAVENOUS ONCE
Status: COMPLETED | OUTPATIENT
Start: 2023-11-30 | End: 2023-11-30

## 2023-11-30 RX ORDER — HEPARIN SODIUM,PORCINE 10 UNIT/ML
5-20 VIAL (ML) INTRAVENOUS DAILY PRN
Status: DISCONTINUED | OUTPATIENT
Start: 2023-11-30 | End: 2023-11-30 | Stop reason: HOSPADM

## 2023-11-30 RX ADMIN — FOSAPREPITANT: 150 INJECTION, POWDER, LYOPHILIZED, FOR SOLUTION INTRAVENOUS at 09:44

## 2023-11-30 RX ADMIN — ETOPOSIDE 210 MG: 20 INJECTION INTRAVENOUS at 10:09

## 2023-11-30 RX ADMIN — PALONOSETRON HYDROCHLORIDE 0.25 MG: 0.25 INJECTION INTRAVENOUS at 09:41

## 2023-11-30 RX ADMIN — SODIUM CHLORIDE 1000 ML: 9 INJECTION, SOLUTION INTRAVENOUS at 09:41

## 2023-11-30 RX ADMIN — CISPLATIN 40 MG: 1 INJECTION, SOLUTION INTRAVENOUS at 11:42

## 2023-11-30 ASSESSMENT — PAIN SCALES - GENERAL: PAINLEVEL: SEVERE PAIN (6)

## 2023-11-30 NOTE — PROGRESS NOTES
Infusion Nursing Note:  Jayson Banegas presents today for Cycle 2 Day 4 Etoposide, Cispplatin.    Patient seen by provider today: No   present during visit today: Not Applicable.    Note: N/A.      Intravenous Access:  Implanted Port.    Treatment Conditions:  See labs from 11/27/23.      Post Infusion Assessment:  Patient tolerated infusion without incident.  Blood return noted pre and post infusion.  Site patent and intact, free from redness, edema or discomfort.  No evidence of extravasations.  Port left accessed for tomorrow's infusion.       Discharge Plan:   Patient declined prescription refills.  Discharge instructions reviewed with: Patient.  Patient verbalized understanding of discharge instructions and all questions answered.  AVS to patient via DOOMOROT.  Patient will return 12/1/23 for next appointment.   Patient discharged in stable condition accompanied by: self.  Departure Mode: Ambulatory.      Randa Raymond RN

## 2023-12-01 ENCOUNTER — INFUSION THERAPY VISIT (OUTPATIENT)
Dept: INFUSION THERAPY | Facility: CLINIC | Age: 28
End: 2023-12-01
Attending: INTERNAL MEDICINE
Payer: COMMERCIAL

## 2023-12-01 VITALS
HEART RATE: 73 BPM | DIASTOLIC BLOOD PRESSURE: 76 MMHG | SYSTOLIC BLOOD PRESSURE: 139 MMHG | OXYGEN SATURATION: 95 % | TEMPERATURE: 97.5 F

## 2023-12-01 DIAGNOSIS — C80.1 EMBRYONAL CARCINOMA (H): Primary | ICD-10-CM

## 2023-12-01 PROCEDURE — 96367 TX/PROPH/DG ADDL SEQ IV INF: CPT

## 2023-12-01 PROCEDURE — 96417 CHEMO IV INFUS EACH ADDL SEQ: CPT

## 2023-12-01 PROCEDURE — 96413 CHEMO IV INFUSION 1 HR: CPT

## 2023-12-01 PROCEDURE — 250N000011 HC RX IP 250 OP 636: Mod: JZ | Performed by: INTERNAL MEDICINE

## 2023-12-01 PROCEDURE — 258N000003 HC RX IP 258 OP 636: Performed by: INTERNAL MEDICINE

## 2023-12-01 PROCEDURE — 96375 TX/PRO/DX INJ NEW DRUG ADDON: CPT

## 2023-12-01 PROCEDURE — 96415 CHEMO IV INFUSION ADDL HR: CPT

## 2023-12-01 RX ORDER — HEPARIN SODIUM (PORCINE) LOCK FLUSH IV SOLN 100 UNIT/ML 100 UNIT/ML
5 SOLUTION INTRAVENOUS
Status: DISCONTINUED | OUTPATIENT
Start: 2023-12-01 | End: 2023-12-01 | Stop reason: HOSPADM

## 2023-12-01 RX ORDER — LORAZEPAM 2 MG/ML
0.5 INJECTION INTRAMUSCULAR EVERY 4 HOURS PRN
Status: DISCONTINUED | OUTPATIENT
Start: 2023-12-01 | End: 2023-12-01 | Stop reason: HOSPADM

## 2023-12-01 RX ORDER — SODIUM CHLORIDE 9 MG/ML
1000 INJECTION, SOLUTION INTRAVENOUS ONCE
Status: COMPLETED | OUTPATIENT
Start: 2023-12-01 | End: 2023-12-01

## 2023-12-01 RX ADMIN — SODIUM CHLORIDE 1000 ML: 9 INJECTION, SOLUTION INTRAVENOUS at 09:18

## 2023-12-01 RX ADMIN — Medication 5 ML: at 12:46

## 2023-12-01 RX ADMIN — DEXAMETHASONE SODIUM PHOSPHATE 12 MG: 10 INJECTION, SOLUTION INTRAMUSCULAR; INTRAVENOUS at 09:29

## 2023-12-01 RX ADMIN — ETOPOSIDE 210 MG: 20 INJECTION INTRAVENOUS at 09:59

## 2023-12-01 RX ADMIN — LORAZEPAM 0.5 MG: 2 INJECTION INTRAMUSCULAR; INTRAVENOUS at 09:26

## 2023-12-01 RX ADMIN — CISPLATIN 40 MG: 1 INJECTION, SOLUTION INTRAVENOUS at 11:41

## 2023-12-01 NOTE — PROGRESS NOTES
Infusion Nursing Note:  Jayson Hoffman Bassam presents today for Cisplatin and Etoposide.    Patient seen by provider today: No   present during visit today: Not Applicable.    Note: Pt seen for C2D5 of cisplatin and etoposide.  Pt c/o nausea this morning, forgot to take his compazine which he says helps.  Pt was given a PRN dose of lorazepam with relief.  Checked in with Mani in pharmacy, no need for more recent lab draw to proceed today.  Tolerated infusion well, port de-accessed this visit.        Intravenous Access:  Implanted Port.    Treatment Conditions:  Not Applicable.      Post Infusion Assessment:  Patient tolerated infusion without incident.  Blood return noted pre and post infusion.  Site patent and intact, free from redness, edema or discomfort.  No evidence of extravasations.  Access discontinued per protocol.       Discharge Plan:   Patient declined prescription refills.  Discharge instructions reviewed with: Patient.  Patient and/or family verbalized understanding of discharge instructions and all questions answered.  AVS to patient via AutoquakeT.  Patient will return 12/4/23 for next appointment.   Patient discharged in stable condition accompanied by: self.  Departure Mode: Ambulatory.      Jennifer Ann RN

## 2023-12-04 ENCOUNTER — INFUSION THERAPY VISIT (OUTPATIENT)
Dept: INFUSION THERAPY | Facility: CLINIC | Age: 28
End: 2023-12-04
Attending: INTERNAL MEDICINE
Payer: COMMERCIAL

## 2023-12-04 ENCOUNTER — TELEPHONE (OUTPATIENT)
Dept: ONCOLOGY | Facility: CLINIC | Age: 28
End: 2023-12-04

## 2023-12-04 VITALS
TEMPERATURE: 97.4 F | HEART RATE: 81 BPM | OXYGEN SATURATION: 98 % | RESPIRATION RATE: 16 BRPM | WEIGHT: 202.2 LBS | BODY MASS INDEX: 28.95 KG/M2 | SYSTOLIC BLOOD PRESSURE: 116 MMHG | HEIGHT: 70 IN | DIASTOLIC BLOOD PRESSURE: 73 MMHG

## 2023-12-04 DIAGNOSIS — G89.3 CANCER ASSOCIATED PAIN: ICD-10-CM

## 2023-12-04 DIAGNOSIS — C80.1 EMBRYONAL CARCINOMA (H): ICD-10-CM

## 2023-12-04 DIAGNOSIS — C80.1 EMBRYONAL CARCINOMA (H): Primary | ICD-10-CM

## 2023-12-04 LAB
ALBUMIN SERPL BCG-MCNC: 4.3 G/DL (ref 3.5–5.2)
ALP SERPL-CCNC: 39 U/L (ref 40–150)
ALT SERPL W P-5'-P-CCNC: 27 U/L (ref 0–70)
ANION GAP SERPL CALCULATED.3IONS-SCNC: 10 MMOL/L (ref 7–15)
AST SERPL W P-5'-P-CCNC: 23 U/L (ref 0–45)
BASOPHILS # BLD AUTO: 0 10E3/UL (ref 0–0.2)
BASOPHILS NFR BLD AUTO: 1 %
BILIRUB SERPL-MCNC: 0.3 MG/DL
BUN SERPL-MCNC: 12.9 MG/DL (ref 6–20)
CALCIUM SERPL-MCNC: 9 MG/DL (ref 8.6–10)
CHLORIDE SERPL-SCNC: 96 MMOL/L (ref 98–107)
CREAT SERPL-MCNC: 0.64 MG/DL (ref 0.67–1.17)
DEPRECATED HCO3 PLAS-SCNC: 27 MMOL/L (ref 22–29)
EGFRCR SERPLBLD CKD-EPI 2021: >90 ML/MIN/1.73M2
EOSINOPHIL # BLD AUTO: 0 10E3/UL (ref 0–0.7)
EOSINOPHIL NFR BLD AUTO: 0 %
ERYTHROCYTE [DISTWIDTH] IN BLOOD BY AUTOMATED COUNT: 15.1 % (ref 10–15)
GLUCOSE SERPL-MCNC: 103 MG/DL (ref 70–99)
HCT VFR BLD AUTO: 40.8 % (ref 40–53)
HGB BLD-MCNC: 13.5 G/DL (ref 13.3–17.7)
IMM GRANULOCYTES # BLD: 0 10E3/UL
IMM GRANULOCYTES NFR BLD: 1 %
LYMPHOCYTES # BLD AUTO: 1.4 10E3/UL (ref 0.8–5.3)
LYMPHOCYTES NFR BLD AUTO: 25 %
MAGNESIUM SERPL-MCNC: 2.1 MG/DL (ref 1.7–2.3)
MCH RBC QN AUTO: 28.4 PG (ref 26.5–33)
MCHC RBC AUTO-ENTMCNC: 33.1 G/DL (ref 31.5–36.5)
MCV RBC AUTO: 86 FL (ref 78–100)
MONOCYTES # BLD AUTO: 0.1 10E3/UL (ref 0–1.3)
MONOCYTES NFR BLD AUTO: 1 %
NEUTROPHILS # BLD AUTO: 4.2 10E3/UL (ref 1.6–8.3)
NEUTROPHILS NFR BLD AUTO: 72 %
NRBC # BLD AUTO: 0 10E3/UL
NRBC BLD AUTO-RTO: 0 /100
PLATELET # BLD AUTO: 351 10E3/UL (ref 150–450)
POTASSIUM SERPL-SCNC: 3.9 MMOL/L (ref 3.4–5.3)
PROT SERPL-MCNC: 7.2 G/DL (ref 6.4–8.3)
RBC # BLD AUTO: 4.76 10E6/UL (ref 4.4–5.9)
SODIUM SERPL-SCNC: 133 MMOL/L (ref 135–145)
WBC # BLD AUTO: 5.8 10E3/UL (ref 4–11)

## 2023-12-04 PROCEDURE — 96413 CHEMO IV INFUSION 1 HR: CPT

## 2023-12-04 PROCEDURE — 36591 DRAW BLOOD OFF VENOUS DEVICE: CPT

## 2023-12-04 PROCEDURE — 258N000003 HC RX IP 258 OP 636: Performed by: NURSE PRACTITIONER

## 2023-12-04 PROCEDURE — 83735 ASSAY OF MAGNESIUM: CPT | Performed by: INTERNAL MEDICINE

## 2023-12-04 PROCEDURE — 96361 HYDRATE IV INFUSION ADD-ON: CPT

## 2023-12-04 PROCEDURE — 258N000003 HC RX IP 258 OP 636: Performed by: INTERNAL MEDICINE

## 2023-12-04 PROCEDURE — 82310 ASSAY OF CALCIUM: CPT | Performed by: INTERNAL MEDICINE

## 2023-12-04 PROCEDURE — 84450 TRANSFERASE (AST) (SGOT): CPT | Performed by: INTERNAL MEDICINE

## 2023-12-04 PROCEDURE — 85025 COMPLETE CBC W/AUTO DIFF WBC: CPT | Performed by: INTERNAL MEDICINE

## 2023-12-04 PROCEDURE — 250N000011 HC RX IP 250 OP 636: Mod: JZ | Performed by: INTERNAL MEDICINE

## 2023-12-04 RX ORDER — HYDROMORPHONE HYDROCHLORIDE 2 MG/1
2-4 TABLET ORAL EVERY 4 HOURS PRN
Qty: 60 TABLET | Refills: 0 | Status: CANCELLED | OUTPATIENT
Start: 2023-12-04

## 2023-12-04 RX ORDER — HEPARIN SODIUM (PORCINE) LOCK FLUSH IV SOLN 100 UNIT/ML 100 UNIT/ML
5 SOLUTION INTRAVENOUS
Status: DISCONTINUED | OUTPATIENT
Start: 2023-12-04 | End: 2023-12-04 | Stop reason: HOSPADM

## 2023-12-04 RX ORDER — HYDROMORPHONE HYDROCHLORIDE 2 MG/1
2-4 TABLET ORAL EVERY 4 HOURS PRN
Qty: 60 TABLET | Refills: 0 | Status: SHIPPED | OUTPATIENT
Start: 2023-12-04 | End: 2023-12-18

## 2023-12-04 RX ADMIN — Medication 5 ML: at 12:33

## 2023-12-04 RX ADMIN — BLEOMYCIN 30 UNITS: 30 POWDER, FOR SOLUTION INTRAMUSCULAR; INTRAPLEURAL; INTRAVENOUS; SUBCUTANEOUS at 12:13

## 2023-12-04 RX ADMIN — SODIUM CHLORIDE 1000 ML: 9 INJECTION, SOLUTION INTRAVENOUS at 11:08

## 2023-12-04 ASSESSMENT — PAIN SCALES - GENERAL: PAINLEVEL: NO PAIN (0)

## 2023-12-04 NOTE — PROGRESS NOTES
"Infusion Nursing Note:  Jayson Banegas presents today for C2D8 Bleomycin.    Patient seen by provider today: No   present during visit today: Not Applicable.    Note: Prior to arrival to infusion, patient reported diarrhea to lab RN and orders were obtained by clinic RN for IV fluids today. Per Tierney, clinic RN/Preston Wiseman NP, give 1L NS IV with treatment today.    Patient states he had a \"tough day yesterday,\" had about 5 episodes of diarrhea, however none this morning. Reports increase in nausea and no appetite over the past few days, denies vomiting. Has ongoing reflux for which he occasionally takes pepcid. Patient has only been taking lorazepam for nausea. Reviewed all antiemetics with patient, and encouraged to take either zofran or compazine on a scheduled basis until nausea resolves. He also has olanzapine to take at night. Educated patient on need to hold senokot when having diarrhea, and that he may use OTC imodium as directed if diarrhea returns. Patient verbalized understanding of above. Denies fevers or any other new concerns. He also states he has noticed ringing in his ears, not constant but is new. Instructed patient to discuss this with Dr. Wick at his exam on 12/18 as that is when he is next due for cisplatin. No baseline PFTs noted for patient, no notes on treatment plan or in provider notes. Discussed with Dr. Wick, who stated baseline PFTs not needed on this patient, and will investigate further if patient becomes symptomatic. Notes added to springboard. Sodium 133 noted, patient received 1L NS today.      Intravenous Access:  Implanted Port in place on arrival, excellent blood return noted.    Treatment Conditions:  Lab Results   Component Value Date    HGB 13.5 12/04/2023    WBC 5.8 12/04/2023    ANEUTAUTO 4.2 12/04/2023     12/04/2023        Lab Results   Component Value Date     (L) 12/04/2023    POTASSIUM 3.9 12/04/2023    MAG 2.1 12/04/2023    CR 0.64 (L) " 12/04/2023    ALEX 9.0 12/04/2023    BILITOTAL 0.3 12/04/2023    ALBUMIN 4.3 12/04/2023    ALT 27 12/04/2023    AST 23 12/04/2023     Results reviewed, labs MET treatment parameters, ok to proceed with treatment.      Post Infusion Assessment:  Patient tolerated infusion without incident.  Blood return noted pre and post infusion.  Site patent and intact, free from redness, edema or discomfort.  No evidence of extravasations.  Access discontinued per protocol.       Discharge Plan:   Discharge instructions reviewed with: Patient.  Patient and/or family verbalized understanding of discharge instructions and all questions answered.  AVS to patient via DialsHART.  Patient will return 12/11/23 for next appointment.   Patient discharged in stable condition accompanied by: aunt.  Departure Mode: Ambulatory.  Face to Face time: 30 minutes.      Daniella Angel RN

## 2023-12-04 NOTE — PROGRESS NOTES
"Nursing Note:  Jayson Banegas presents today for Port labs.    Patient seen by provider today: No   present during visit today: Not Applicable.    Note: Patient arrives for labs draw reporting a \"rough day\" yesterday with diarrhea.  Denies any diarrhea this morning but reports nausea and some dizziness.  Is also asking for refills on MS Contin and Dilaudid prescriptions. Tierney Santo RN made aware of patient 's refill requests and will work on those orders.  Tierney is also working on obtaining IVF orders today for patient to get hydration with his scheduled Bleomycin.  Patient's infusion nurse, Daniella Angel RN updated on patient's status.     Intravenous Access:  Implanted Port.    Discharge Plan:   Patient was sent to [facility] for infusion appointment.    Radha Montemayor RN         "

## 2023-12-07 ENCOUNTER — VIRTUAL VISIT (OUTPATIENT)
Dept: PALLIATIVE CARE | Facility: CLINIC | Age: 28
End: 2023-12-07
Attending: INTERNAL MEDICINE
Payer: COMMERCIAL

## 2023-12-07 VITALS — HEIGHT: 70 IN | WEIGHT: 202 LBS | BODY MASS INDEX: 28.92 KG/M2

## 2023-12-07 DIAGNOSIS — T45.1X5A CINV (CHEMOTHERAPY-INDUCED NAUSEA AND VOMITING): ICD-10-CM

## 2023-12-07 DIAGNOSIS — C80.1 EMBRYONAL CARCINOMA (H): Primary | ICD-10-CM

## 2023-12-07 DIAGNOSIS — R11.2 CINV (CHEMOTHERAPY-INDUCED NAUSEA AND VOMITING): ICD-10-CM

## 2023-12-07 DIAGNOSIS — G89.3 NEOPLASM RELATED PAIN: ICD-10-CM

## 2023-12-07 PROCEDURE — 99215 OFFICE O/P EST HI 40 MIN: CPT | Mod: VID | Performed by: INTERNAL MEDICINE

## 2023-12-07 RX ORDER — OLANZAPINE 5 MG/1
5-10 TABLET, ORALLY DISINTEGRATING ORAL 2 TIMES DAILY PRN
COMMUNITY
Start: 2023-12-07 | End: 2024-01-02

## 2023-12-07 ASSESSMENT — PAIN SCALES - GENERAL: PAINLEVEL: NO PAIN (0)

## 2023-12-07 NOTE — NURSING NOTE
Is the patient currently in the state of [place]? YES    Visit mode:VIDEO    If the visit is dropped, the patient can be reconnected by: VIDEO VISIT: Send to e-mail at: [email or web address]    Will anyone else be joining the visit? Yes   (If patient encounters technical issues they should call [phone]    How would you like to obtain your AVS? MyChart    Are changes needed to the allergy or medication list? No    Reason for visit: Video Visit (Follow Up )    Josee HERNANDEZ

## 2023-12-07 NOTE — LETTER
12/7/2023       RE: Jayson Banegas  [address]  [address]  [facility] [address]     Dear Colleague,    Thank you for referring your patient, [facility]ayson Banegas, [facility]. Please see a copy of my visit note below.    Palliative Care Outpatient Clinic      Patient ID:  Medical - He has metastatic testicular cancer (embryonal carcinoma), presenting late Sept 2023 (mets to lung, intraabd & retroperitoneal lymph nodes).   11/2023 initiated BEP in the hospital     He has OBED; MDD since 13 years old, started ADs when 18; PTSD; alcohol use disorder--went through chem dep treatment 6 years ago, been in recovery since.     Social - Lives with girlfriend. Parents closely involved. Mom Deb. Stays with parents sometimes. Grandma stays with him during the day sometimes. 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     Care Planning - No HCD  10/2023 Dr Wick told him 73% 5 year survival. Cancer is curable.      Opioid Safety -   Hx of AUD in recovery x 6 years  Martin discussion with him about elevated addiction risk with opioids and bzds 11/2023.   11/2023 asked him not to drive until specifically told it's ok  D/w him plan to fully taper opioids off, assuming he responds well to his definitive anticancer tx, even if he has some residual pain.          History:  History gathered today from: patient, family/loved ones, medical chart  Deb is with him    Pain up and down but overall a bit better than a couple months ago overall.  Needing less dilaudid--4-6 a day now; 2 mg tabs  Using cannabis gummies regularly for pain and sleep  Tolerating well except OIC  OIC--takes senna 3-4 a day and it helps; but he doesn't always take this even though it works; cramps if more than 4    Nausea bad after chemo  Maintaining weight though  Takes lorazepam for severe nausea  Takes olanzapine 5-10 mg prn; 'really  "helps.'  We discussed that it's a powerful antiemetic and I'm glad it helps; it can make people tired    Mood  More irritable than baseline; but Deb notes he's overall improved from early in diagnosis      PE: Ht 1.778 m (5' 10\")   Wt 91.6 kg (202 lb)   BMI 28.98 kg/m     Wt Readings from Last 3 Encounters:   12/07/23 91.6 kg (202 lb)   12/04/23 91.7 kg (202 lb 3.2 oz)   11/27/23 91.2 kg (201 lb)     Alert NAD    Data reviewed:  I reviewed recent labs and imaging, my comments:  Na 133  Cr 0.6  CBC nl     database reviewed: y      Impression & Recommendations:    29 yo with metastatic testicular (embryonal) carcinoma on BEP    Cancer related pain  Improving--I assume 2/2 treatment response  Continue MSContin 15 mg bid  Continue 2-4 mg hydromorphone prn; he's reducing his doses as his pain improves    CINV  D/w him the natural hx of this with chemo  He took 10 mg olanzapine yesterday and it really helped and he tolerated it well; d/w him ok for him to use 5-10 mg bid prn; he will likely only need it short term with each cycle    Mood and spirits are doing good  Scans next week and then sees Dr Wick.    Follow-up 1 month  If he continues to do well from a symptom standpoint he may be fine no longer following with us    Over 40 minutes spent on the date of the encounter doing chart review, history and exam, patient education & counseling, documentation and other activities as noted above.    Thank you for involving us in the patient's care.   Jayesh Monahan MD / Palliative Medicine / Text me via [employer]  This note may have been composed with voice recognition software and there may be mistranscriptions.          Again, thank you for allowing me to participate in the care of your patient.      Sincerely,    Jayesh Monahan MD    "

## 2023-12-07 NOTE — PROGRESS NOTES
"Palliative Care Outpatient Clinic      Patient ID:  Medical - He has metastatic testicular cancer (embryonal carcinoma), presenting late Sept 2023 (mets to lung, intraabd & retroperitoneal lymph nodes).   11/2023 initiated BEP in the hospital     He has OBED; MDD since 13 years old, started ADs when 18; PTSD; alcohol use disorder--went through chem dep treatment 6 years ago, been in recovery since.     Social - Lives with girlfriend. Parents closely involved. Mom Deb. Stays with parents sometimes. Grandma stays with him during the day sometimes. 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     Care Planning - No HCD  10/2023 Dr Wick told him 73% 5 year survival. Cancer is curable.      Opioid Safety -   Hx of AUD in recovery x 6 years  Martin discussion with him about elevated addiction risk with opioids and bzds 11/2023.   11/2023 asked him not to drive until specifically told it's ok  D/w him plan to fully taper opioids off, assuming he responds well to his definitive anticancer tx, even if he has some residual pain.          History:  History gathered today from: patient, family/loved ones, medical chart  Deb is with him    Pain up and down but overall a bit better than a couple months ago overall.  Needing less dilaudid--4-6 a day now; 2 mg tabs  Using cannabis gummies regularly for pain and sleep  Tolerating well except OIC  OIC--takes senna 3-4 a day and it helps; but he doesn't always take this even though it works; cramps if more than 4    Nausea bad after chemo  Maintaining weight though  Takes lorazepam for severe nausea  Takes olanzapine 5-10 mg prn; 'really helps.'  We discussed that it's a powerful antiemetic and I'm glad it helps; it can make people tired    Mood  More irritable than baseline; but Deb notes he's overall improved from early in diagnosis      PE: Ht 1.778 m (5' 10\")   Wt 91.6 kg (202 lb)   BMI 28.98 kg/m     Wt Readings from Last 3 Encounters:   12/07/23 " 91.6 kg (202 lb)   12/04/23 91.7 kg (202 lb 3.2 oz)   11/27/23 91.2 kg (201 lb)     Alert NAD    Data reviewed:  I reviewed recent labs and imaging, my comments:  Na 133  Cr 0.6  CBC nl     database reviewed: y      Impression & Recommendations:    27 yo with metastatic testicular (embryonal) carcinoma on BEP    Cancer related pain  Improving--I assume 2/2 treatment response  Continue MSContin 15 mg bid  Continue 2-4 mg hydromorphone prn; he's reducing his doses as his pain improves    CINV  D/w him the natural hx of this with chemo  He took 10 mg olanzapine yesterday and it really helped and he tolerated it well; d/w him ok for him to use 5-10 mg bid prn; he will likely only need it short term with each cycle    Mood and spirits are doing good  Scans next week and then sees Dr Wick.    Follow-up 1 month  If he continues to do well from a symptom standpoint he may be fine no longer following with us    Over 40 minutes spent on the date of the encounter doing chart review, history and exam, patient education & counseling, documentation and other activities as noted above.    Thank you for involving us in the patient's care.   Jayesh Monahan MD / Palliative Medicine / Estephanie maya via [employer]  This note may have been composed with voice recognition software and there may be mistranscriptions.        Virtual Visit Details  Type of service:  Video Visit   Originating Location (pt. Location): Home  Distant Location (provider location):  Off-site  Platform used for Video Visit: Olinda

## 2023-12-11 ENCOUNTER — LAB (OUTPATIENT)
Dept: INFUSION THERAPY | Facility: CLINIC | Age: 28
End: 2023-12-11
Attending: INTERNAL MEDICINE
Payer: COMMERCIAL

## 2023-12-11 VITALS
SYSTOLIC BLOOD PRESSURE: 109 MMHG | OXYGEN SATURATION: 96 % | WEIGHT: 208 LBS | BODY MASS INDEX: 29.84 KG/M2 | RESPIRATION RATE: 16 BRPM | TEMPERATURE: 97.9 F | HEART RATE: 82 BPM | DIASTOLIC BLOOD PRESSURE: 70 MMHG

## 2023-12-11 DIAGNOSIS — C80.1 EMBRYONAL CARCINOMA (H): Primary | ICD-10-CM

## 2023-12-11 LAB
ANION GAP SERPL CALCULATED.3IONS-SCNC: 9 MMOL/L (ref 7–15)
BASOPHILS # BLD AUTO: 0 10E3/UL (ref 0–0.2)
BASOPHILS NFR BLD AUTO: 1 %
BUN SERPL-MCNC: 9.5 MG/DL (ref 6–20)
CALCIUM SERPL-MCNC: 9.2 MG/DL (ref 8.6–10)
CHLORIDE SERPL-SCNC: 100 MMOL/L (ref 98–107)
CREAT SERPL-MCNC: 0.67 MG/DL (ref 0.67–1.17)
DEPRECATED HCO3 PLAS-SCNC: 27 MMOL/L (ref 22–29)
EGFRCR SERPLBLD CKD-EPI 2021: >90 ML/MIN/1.73M2
EOSINOPHIL # BLD AUTO: 0 10E3/UL (ref 0–0.7)
EOSINOPHIL NFR BLD AUTO: 0 %
ERYTHROCYTE [DISTWIDTH] IN BLOOD BY AUTOMATED COUNT: 14.8 % (ref 10–15)
GLUCOSE SERPL-MCNC: 80 MG/DL (ref 70–99)
HCT VFR BLD AUTO: 38.2 % (ref 40–53)
HGB BLD-MCNC: 12.5 G/DL (ref 13.3–17.7)
IMM GRANULOCYTES # BLD: 0 10E3/UL
IMM GRANULOCYTES NFR BLD: 0 %
LYMPHOCYTES # BLD AUTO: 1.1 10E3/UL (ref 0.8–5.3)
LYMPHOCYTES NFR BLD AUTO: 25 %
MCH RBC QN AUTO: 28.4 PG (ref 26.5–33)
MCHC RBC AUTO-ENTMCNC: 32.7 G/DL (ref 31.5–36.5)
MCV RBC AUTO: 87 FL (ref 78–100)
MONOCYTES # BLD AUTO: 0.6 10E3/UL (ref 0–1.3)
MONOCYTES NFR BLD AUTO: 14 %
NEUTROPHILS # BLD AUTO: 2.7 10E3/UL (ref 1.6–8.3)
NEUTROPHILS NFR BLD AUTO: 60 %
NRBC # BLD AUTO: 0 10E3/UL
NRBC BLD AUTO-RTO: 0 /100
PLATELET # BLD AUTO: 203 10E3/UL (ref 150–450)
POTASSIUM SERPL-SCNC: 4.5 MMOL/L (ref 3.4–5.3)
RBC # BLD AUTO: 4.4 10E6/UL (ref 4.4–5.9)
SODIUM SERPL-SCNC: 136 MMOL/L (ref 135–145)
WBC # BLD AUTO: 4.5 10E3/UL (ref 4–11)

## 2023-12-11 PROCEDURE — 258N000003 HC RX IP 258 OP 636: Performed by: INTERNAL MEDICINE

## 2023-12-11 PROCEDURE — 80048 BASIC METABOLIC PNL TOTAL CA: CPT | Performed by: INTERNAL MEDICINE

## 2023-12-11 PROCEDURE — 36591 DRAW BLOOD OFF VENOUS DEVICE: CPT | Performed by: INTERNAL MEDICINE

## 2023-12-11 PROCEDURE — 96413 CHEMO IV INFUSION 1 HR: CPT

## 2023-12-11 PROCEDURE — 250N000011 HC RX IP 250 OP 636: Mod: JZ | Performed by: INTERNAL MEDICINE

## 2023-12-11 PROCEDURE — 85004 AUTOMATED DIFF WBC COUNT: CPT | Performed by: INTERNAL MEDICINE

## 2023-12-11 RX ORDER — HEPARIN SODIUM (PORCINE) LOCK FLUSH IV SOLN 100 UNIT/ML 100 UNIT/ML
5 SOLUTION INTRAVENOUS
Status: DISCONTINUED | OUTPATIENT
Start: 2023-12-11 | End: 2023-12-11 | Stop reason: HOSPADM

## 2023-12-11 RX ADMIN — Medication 5 ML: at 13:26

## 2023-12-11 RX ADMIN — SODIUM CHLORIDE 250 ML: 9 INJECTION, SOLUTION INTRAVENOUS at 13:03

## 2023-12-11 RX ADMIN — BLEOMYCIN SULFATE 30 UNITS: 30 INJECTION, POWDER, LYOPHILIZED, FOR SOLUTION INTRAMUSCULAR; INTRAPLEURAL; INTRAVENOUS; SUBCUTANEOUS at 13:05

## 2023-12-11 NOTE — PROGRESS NOTES
Nursing Note:  Jayson Banegas presents today for Port Labs.    Patient seen by provider today: No   present during visit today: Not Applicable.    Note: N/A.    Intravenous Access:  Labs drawn without difficulty.  Implanted Port.    Discharge Plan:   Patient was sent to [facility] for infusion appointment.    Ana Alexandra RN

## 2023-12-11 NOTE — PROGRESS NOTES
Infusion Nursing Note:  Jayson Banegas presents today for C2D15: Bleomycin.    Patient seen by provider today: No   present during visit today: Not Applicable.    Note: Patient reports to feeling better this past weekend with no diarrhea and good ability to eat and drink. Also denies nausea.  Patient reports that he had had intermittent pain in left lower lung.  Pain at rate of 6/10 and comes and goes.  Patient reports to having had this pain before and seems to be controlled with his prn pain medications.  Patient also reports some increase in ringing of his ears.  Has noticed that both of this ears have been ringing intermittently but the seems to have increased in how often the ringing occurs.  Patient is due to see Dr. Wick next Monday after infusion (scheduled for C4D1 BEP).  [employer] message sent to Dr Wick to alert her in this new symptom so she can address with patient.     Intravenous Access:  Implanted Port.    Treatment Conditions:  Lab Results   Component Value Date    HGB 12.5 (L) 12/11/2023    WBC 4.5 12/11/2023    ANEUTAUTO 2.7 12/11/2023     12/11/2023        Lab Results   Component Value Date     12/11/2023    POTASSIUM 4.5 12/11/2023    MAG 2.1 12/04/2023    CR 0.67 12/11/2023    ALEX 9.2 12/11/2023    BILITOTAL 0.3 12/04/2023    ALBUMIN 4.3 12/04/2023    ALT 27 12/04/2023    AST 23 12/04/2023       Results reviewed, labs MET treatment parameters, ok to proceed with treatment.      Post Infusion Assessment:  Patient tolerated infusion without incident.  Blood return noted pre and post infusion.  Site patent and intact, free from redness, edema or discomfort.  No evidence of extravasations.  Access discontinued per protocol.       Discharge Plan:   Patient declined prescription refills.  Discharge instructions reviewed with: Patient.  Patient and/or family verbalized understanding of discharge instructions and all questions answered.  AVS to patient via [email or web address].   Patient will return 12/15/23 for CT scan and labs for next appointment.   Patient discharged in stable condition accompanied by: friend.  Departure Mode: Ambulatory.      Radha Montemayor RN

## 2023-12-12 DIAGNOSIS — C80.1 EMBRYONAL CARCINOMA (H): ICD-10-CM

## 2023-12-12 DIAGNOSIS — H93.13 TINNITUS, BILATERAL: Primary | ICD-10-CM

## 2023-12-14 DIAGNOSIS — C80.1 EMBRYONAL CARCINOMA (H): Primary | ICD-10-CM

## 2023-12-14 RX ORDER — PALONOSETRON 0.05 MG/ML
0.25 INJECTION, SOLUTION INTRAVENOUS ONCE
Status: CANCELLED | OUTPATIENT
Start: 2023-12-21

## 2023-12-14 RX ORDER — HEPARIN SODIUM,PORCINE 10 UNIT/ML
5-20 VIAL (ML) INTRAVENOUS DAILY PRN
Status: CANCELLED | OUTPATIENT
Start: 2023-12-22

## 2023-12-14 RX ORDER — ALBUTEROL SULFATE 0.83 MG/ML
2.5 SOLUTION RESPIRATORY (INHALATION)
Status: CANCELLED | OUTPATIENT
Start: 2023-12-22

## 2023-12-14 RX ORDER — HEPARIN SODIUM (PORCINE) LOCK FLUSH IV SOLN 100 UNIT/ML 100 UNIT/ML
5 SOLUTION INTRAVENOUS
Status: CANCELLED | OUTPATIENT
Start: 2023-12-19

## 2023-12-14 RX ORDER — HEPARIN SODIUM (PORCINE) LOCK FLUSH IV SOLN 100 UNIT/ML 100 UNIT/ML
5 SOLUTION INTRAVENOUS
Status: CANCELLED | OUTPATIENT
Start: 2023-12-18

## 2023-12-14 RX ORDER — ALBUTEROL SULFATE 0.83 MG/ML
2.5 SOLUTION RESPIRATORY (INHALATION)
Status: CANCELLED | OUTPATIENT
Start: 2023-12-19

## 2023-12-14 RX ORDER — HEPARIN SODIUM,PORCINE 10 UNIT/ML
5-20 VIAL (ML) INTRAVENOUS DAILY PRN
Status: CANCELLED | OUTPATIENT
Start: 2023-12-25

## 2023-12-14 RX ORDER — ALBUTEROL SULFATE 90 UG/1
1-2 AEROSOL, METERED RESPIRATORY (INHALATION)
Status: CANCELLED
Start: 2023-12-20

## 2023-12-14 RX ORDER — LORAZEPAM 2 MG/ML
0.5 INJECTION INTRAMUSCULAR EVERY 4 HOURS PRN
Status: CANCELLED | OUTPATIENT
Start: 2023-12-18

## 2023-12-14 RX ORDER — MEPERIDINE HYDROCHLORIDE 25 MG/ML
25 INJECTION INTRAMUSCULAR; INTRAVENOUS; SUBCUTANEOUS EVERY 30 MIN PRN
Status: CANCELLED | OUTPATIENT
Start: 2023-12-25

## 2023-12-14 RX ORDER — MEPERIDINE HYDROCHLORIDE 25 MG/ML
25 INJECTION INTRAMUSCULAR; INTRAVENOUS; SUBCUTANEOUS EVERY 30 MIN PRN
Status: CANCELLED | OUTPATIENT
Start: 2024-01-01

## 2023-12-14 RX ORDER — HEPARIN SODIUM,PORCINE 10 UNIT/ML
5-20 VIAL (ML) INTRAVENOUS DAILY PRN
Status: CANCELLED | OUTPATIENT
Start: 2023-12-20

## 2023-12-14 RX ORDER — ALBUTEROL SULFATE 90 UG/1
1-2 AEROSOL, METERED RESPIRATORY (INHALATION)
Status: CANCELLED
Start: 2023-12-21

## 2023-12-14 RX ORDER — DIPHENHYDRAMINE HYDROCHLORIDE 50 MG/ML
50 INJECTION INTRAMUSCULAR; INTRAVENOUS
Status: CANCELLED
Start: 2023-12-21

## 2023-12-14 RX ORDER — LORAZEPAM 2 MG/ML
0.5 INJECTION INTRAMUSCULAR EVERY 4 HOURS PRN
Status: CANCELLED | OUTPATIENT
Start: 2023-12-19

## 2023-12-14 RX ORDER — EPINEPHRINE 1 MG/ML
0.3 INJECTION, SOLUTION, CONCENTRATE INTRAVENOUS EVERY 5 MIN PRN
Status: CANCELLED | OUTPATIENT
Start: 2023-12-22

## 2023-12-14 RX ORDER — MEPERIDINE HYDROCHLORIDE 25 MG/ML
25 INJECTION INTRAMUSCULAR; INTRAVENOUS; SUBCUTANEOUS EVERY 30 MIN PRN
Status: CANCELLED | OUTPATIENT
Start: 2023-12-21

## 2023-12-14 RX ORDER — PALONOSETRON 0.05 MG/ML
0.25 INJECTION, SOLUTION INTRAVENOUS ONCE
Status: CANCELLED | OUTPATIENT
Start: 2023-12-18

## 2023-12-14 RX ORDER — MEPERIDINE HYDROCHLORIDE 25 MG/ML
25 INJECTION INTRAMUSCULAR; INTRAVENOUS; SUBCUTANEOUS EVERY 30 MIN PRN
Status: CANCELLED | OUTPATIENT
Start: 2023-12-20

## 2023-12-14 RX ORDER — EPINEPHRINE 1 MG/ML
0.3 INJECTION, SOLUTION, CONCENTRATE INTRAVENOUS EVERY 5 MIN PRN
Status: CANCELLED | OUTPATIENT
Start: 2023-12-18

## 2023-12-14 RX ORDER — EPINEPHRINE 1 MG/ML
0.3 INJECTION, SOLUTION, CONCENTRATE INTRAVENOUS EVERY 5 MIN PRN
Status: CANCELLED | OUTPATIENT
Start: 2023-12-20

## 2023-12-14 RX ORDER — DIPHENHYDRAMINE HYDROCHLORIDE 50 MG/ML
50 INJECTION INTRAMUSCULAR; INTRAVENOUS
Status: CANCELLED
Start: 2023-12-19

## 2023-12-14 RX ORDER — HEPARIN SODIUM,PORCINE 10 UNIT/ML
5-20 VIAL (ML) INTRAVENOUS DAILY PRN
Status: CANCELLED | OUTPATIENT
Start: 2024-01-01

## 2023-12-14 RX ORDER — EPINEPHRINE 1 MG/ML
0.3 INJECTION, SOLUTION, CONCENTRATE INTRAVENOUS EVERY 5 MIN PRN
Status: CANCELLED | OUTPATIENT
Start: 2024-01-01

## 2023-12-14 RX ORDER — MEPERIDINE HYDROCHLORIDE 25 MG/ML
25 INJECTION INTRAMUSCULAR; INTRAVENOUS; SUBCUTANEOUS EVERY 30 MIN PRN
Status: CANCELLED | OUTPATIENT
Start: 2023-12-22

## 2023-12-14 RX ORDER — ALBUTEROL SULFATE 90 UG/1
1-2 AEROSOL, METERED RESPIRATORY (INHALATION)
Status: CANCELLED
Start: 2023-12-25

## 2023-12-14 RX ORDER — DIPHENHYDRAMINE HYDROCHLORIDE 50 MG/ML
50 INJECTION INTRAMUSCULAR; INTRAVENOUS
Status: CANCELLED
Start: 2024-01-01

## 2023-12-14 RX ORDER — SODIUM CHLORIDE 9 MG/ML
1000 INJECTION, SOLUTION INTRAVENOUS ONCE
Status: CANCELLED
Start: 2023-12-19

## 2023-12-14 RX ORDER — DIPHENHYDRAMINE HYDROCHLORIDE 50 MG/ML
50 INJECTION INTRAMUSCULAR; INTRAVENOUS
Status: CANCELLED
Start: 2023-12-25

## 2023-12-14 RX ORDER — ALBUTEROL SULFATE 90 UG/1
1-2 AEROSOL, METERED RESPIRATORY (INHALATION)
Status: CANCELLED
Start: 2024-01-01

## 2023-12-14 RX ORDER — ALBUTEROL SULFATE 0.83 MG/ML
2.5 SOLUTION RESPIRATORY (INHALATION)
Status: CANCELLED | OUTPATIENT
Start: 2023-12-20

## 2023-12-14 RX ORDER — ALBUTEROL SULFATE 0.83 MG/ML
2.5 SOLUTION RESPIRATORY (INHALATION)
Status: CANCELLED | OUTPATIENT
Start: 2023-12-21

## 2023-12-14 RX ORDER — LORAZEPAM 2 MG/ML
0.5 INJECTION INTRAMUSCULAR EVERY 4 HOURS PRN
Status: CANCELLED | OUTPATIENT
Start: 2023-12-21

## 2023-12-14 RX ORDER — DIPHENHYDRAMINE HYDROCHLORIDE 50 MG/ML
50 INJECTION INTRAMUSCULAR; INTRAVENOUS
Status: CANCELLED
Start: 2023-12-22

## 2023-12-14 RX ORDER — ALBUTEROL SULFATE 90 UG/1
1-2 AEROSOL, METERED RESPIRATORY (INHALATION)
Status: CANCELLED
Start: 2023-12-22

## 2023-12-14 RX ORDER — LORAZEPAM 2 MG/ML
0.5 INJECTION INTRAMUSCULAR EVERY 4 HOURS PRN
Status: CANCELLED | OUTPATIENT
Start: 2023-12-25

## 2023-12-14 RX ORDER — EPINEPHRINE 1 MG/ML
0.3 INJECTION, SOLUTION, CONCENTRATE INTRAVENOUS EVERY 5 MIN PRN
Status: CANCELLED | OUTPATIENT
Start: 2023-12-19

## 2023-12-14 RX ORDER — DIPHENHYDRAMINE HYDROCHLORIDE 50 MG/ML
50 INJECTION INTRAMUSCULAR; INTRAVENOUS
Status: CANCELLED
Start: 2023-12-18

## 2023-12-14 RX ORDER — MEPERIDINE HYDROCHLORIDE 25 MG/ML
25 INJECTION INTRAMUSCULAR; INTRAVENOUS; SUBCUTANEOUS EVERY 30 MIN PRN
Status: CANCELLED | OUTPATIENT
Start: 2023-12-18

## 2023-12-14 RX ORDER — SODIUM CHLORIDE 9 MG/ML
1000 INJECTION, SOLUTION INTRAVENOUS ONCE
Status: CANCELLED
Start: 2023-12-21

## 2023-12-14 RX ORDER — HEPARIN SODIUM (PORCINE) LOCK FLUSH IV SOLN 100 UNIT/ML 100 UNIT/ML
5 SOLUTION INTRAVENOUS
Status: CANCELLED | OUTPATIENT
Start: 2023-12-25

## 2023-12-14 RX ORDER — LORAZEPAM 2 MG/ML
0.5 INJECTION INTRAMUSCULAR EVERY 4 HOURS PRN
Status: CANCELLED | OUTPATIENT
Start: 2024-01-01

## 2023-12-14 RX ORDER — SODIUM CHLORIDE 9 MG/ML
1000 INJECTION, SOLUTION INTRAVENOUS ONCE
Status: CANCELLED
Start: 2023-12-18

## 2023-12-14 RX ORDER — DIPHENHYDRAMINE HYDROCHLORIDE 50 MG/ML
50 INJECTION INTRAMUSCULAR; INTRAVENOUS
Status: CANCELLED
Start: 2023-12-20

## 2023-12-14 RX ORDER — MEPERIDINE HYDROCHLORIDE 25 MG/ML
25 INJECTION INTRAMUSCULAR; INTRAVENOUS; SUBCUTANEOUS EVERY 30 MIN PRN
Status: CANCELLED | OUTPATIENT
Start: 2023-12-19

## 2023-12-14 RX ORDER — SODIUM CHLORIDE 9 MG/ML
1000 INJECTION, SOLUTION INTRAVENOUS ONCE
Status: CANCELLED
Start: 2023-12-20

## 2023-12-14 RX ORDER — HEPARIN SODIUM (PORCINE) LOCK FLUSH IV SOLN 100 UNIT/ML 100 UNIT/ML
5 SOLUTION INTRAVENOUS
Status: CANCELLED | OUTPATIENT
Start: 2023-12-20

## 2023-12-14 RX ORDER — EPINEPHRINE 1 MG/ML
0.3 INJECTION, SOLUTION, CONCENTRATE INTRAVENOUS EVERY 5 MIN PRN
Status: CANCELLED | OUTPATIENT
Start: 2023-12-25

## 2023-12-14 RX ORDER — HEPARIN SODIUM,PORCINE 10 UNIT/ML
5-20 VIAL (ML) INTRAVENOUS DAILY PRN
Status: CANCELLED | OUTPATIENT
Start: 2023-12-19

## 2023-12-14 RX ORDER — SODIUM CHLORIDE 9 MG/ML
1000 INJECTION, SOLUTION INTRAVENOUS ONCE
Status: CANCELLED
Start: 2023-12-22

## 2023-12-14 RX ORDER — ALBUTEROL SULFATE 0.83 MG/ML
2.5 SOLUTION RESPIRATORY (INHALATION)
Status: CANCELLED | OUTPATIENT
Start: 2023-12-18

## 2023-12-14 RX ORDER — HEPARIN SODIUM,PORCINE 10 UNIT/ML
5-20 VIAL (ML) INTRAVENOUS DAILY PRN
Status: CANCELLED | OUTPATIENT
Start: 2023-12-21

## 2023-12-14 RX ORDER — ALBUTEROL SULFATE 90 UG/1
1-2 AEROSOL, METERED RESPIRATORY (INHALATION)
Status: CANCELLED
Start: 2023-12-19

## 2023-12-14 RX ORDER — LORAZEPAM 2 MG/ML
0.5 INJECTION INTRAMUSCULAR EVERY 4 HOURS PRN
Status: CANCELLED | OUTPATIENT
Start: 2023-12-22

## 2023-12-14 RX ORDER — EPINEPHRINE 1 MG/ML
0.3 INJECTION, SOLUTION, CONCENTRATE INTRAVENOUS EVERY 5 MIN PRN
Status: CANCELLED | OUTPATIENT
Start: 2023-12-21

## 2023-12-14 RX ORDER — ALBUTEROL SULFATE 0.83 MG/ML
2.5 SOLUTION RESPIRATORY (INHALATION)
Status: CANCELLED | OUTPATIENT
Start: 2023-12-25

## 2023-12-14 RX ORDER — ALBUTEROL SULFATE 90 UG/1
1-2 AEROSOL, METERED RESPIRATORY (INHALATION)
Status: CANCELLED
Start: 2023-12-18

## 2023-12-14 RX ORDER — LORAZEPAM 2 MG/ML
0.5 INJECTION INTRAMUSCULAR EVERY 4 HOURS PRN
Status: CANCELLED | OUTPATIENT
Start: 2023-12-20

## 2023-12-14 RX ORDER — HEPARIN SODIUM (PORCINE) LOCK FLUSH IV SOLN 100 UNIT/ML 100 UNIT/ML
5 SOLUTION INTRAVENOUS
Status: CANCELLED | OUTPATIENT
Start: 2023-12-21

## 2023-12-14 RX ORDER — ALBUTEROL SULFATE 0.83 MG/ML
2.5 SOLUTION RESPIRATORY (INHALATION)
Status: CANCELLED | OUTPATIENT
Start: 2024-01-01

## 2023-12-14 RX ORDER — HEPARIN SODIUM,PORCINE 10 UNIT/ML
5-20 VIAL (ML) INTRAVENOUS DAILY PRN
Status: CANCELLED | OUTPATIENT
Start: 2023-12-18

## 2023-12-14 RX ORDER — HEPARIN SODIUM (PORCINE) LOCK FLUSH IV SOLN 100 UNIT/ML 100 UNIT/ML
5 SOLUTION INTRAVENOUS
Status: CANCELLED | OUTPATIENT
Start: 2023-12-22

## 2023-12-14 RX ORDER — HEPARIN SODIUM (PORCINE) LOCK FLUSH IV SOLN 100 UNIT/ML 100 UNIT/ML
5 SOLUTION INTRAVENOUS
Status: CANCELLED | OUTPATIENT
Start: 2024-01-01

## 2023-12-14 NOTE — PROGRESS NOTES
[facility] Cancer Care    Hematology/Oncology Established Patient Note      Today's Date: 12/18/23    Reason for visit: Embryonal carcinoma.    HISTORY OF PRESENT ILLNESS: Jayson Banegas is a 28 year old male with history of alcoholism and nicotine dependence in remission, depression, anxiety, and PTSD who presents with the following oncologic history:  1. 9/2023: Presented to PCP with left lower quadrant abdominal pain.  Given 2 weeks of antibiotics with no improvement.  2. 9/29/2023: CT abdomen/pelvis with contrast at [facility] showed new 10 mm noncalcified left lower lobe lung nodule; no effusion; diffuse mild/moderate abdominal/pelvic retroperitoneal lymphadenopathy, largest nodes 3.0 x 1.8 cm   retrocrural, 3.0 x 1.8 cm left periaortic, 2.5 x 2.3 cm left   periaortic, 4.2 x 2.2 cm left external iliac, 2.0 x 1.4 cm right internal iliac, 2.7 x 2.2 cm left internal iliac, and 2.8 x 1.7 cm  left inguinal. No right inguinal lymphadenopathy. Multiple small, mildly enlarged mesenteric root lymph nodes. Mild haziness mesenteric root and retroperitoneal fat. Right testicle now located in scrotum; left testicle not included in field of view; no bone lesions.  3. 9/29/2023: U/S of scrotum with Doppler showed no evidence for intratesticular mass; small benign right epididymal head cyst; small left varicocele. CBC normal except monocytes slightly elevated at 1.1; normal BMP; CRP slightly elevated at 1.04.  4. 10/02/2023: CT chest with contrast showed multiple indeterminate well-circumscribed solid pulmonary nodules in the right lung; 3 mm nodule anterior right upper lobe; 8 mm solid pulmonary nodule in right middle lobe; 6 mm nodule in lateral right lower lobe; additional 10 mm right lower lobe lung nodule; no infiltrates or effusions; unchanged upper abdominal lymphadenopathy.  5. 10/11/2023: CT-guided biopsy of retroperitoneal lymph node at [facility] showed embryonal carcinoma, cannot rule  out mixed type. Biopsy of 6 mm of right lung biopsy was negative for malignancy. LDH elevated at 1643. Beta-HCG = 856, AFP = 34. Biopsy specimen sent to [facility] for review of pathology.  6. 10/23/2023: Presented with chest pain.  CT chest at [facility] showed no PE. Multiple pulmonary nodules enlarged since 10/02/2023. Prior right lower lobe lung nodule 7 mm, now 13 mm, prior 8 mm right middle lobe nodule now 12 mm; prior 6 mm right lower lobe lung nodule now 12 mm; new 2 mm right lower lobe lung nodule; enlarging left upper quadrant mass arising from or engulfing left adrenal gland 2.2 x 4.6 cm; upper abdominal lymphadenopathy have enlarged.  7. 10/26/2023: PET/CT scan showed multiple hypermetabolic pulmonary nodules most likely reflecting metastatic disease. Abnormal hypermetabolic left retrocrural lymph node. Otherwise no abnormal adenopathy in the neck or chest. Diffuse hypermetabolic lymphadenopathy throughout the retroperitoneum, mesentery, and bilateral iliac chains left greater than right as well as in the left groin region.   8.  11/1/2023: Started BEP - bleomycin, etoposide, cisplatin.    INTERVAL HISTORY:  Delroy reports dry eyes, daily nausea that does respond to prochlorperazine and olanzapine.  He reports now upper abdominal area that responds to Dilaudid. He uses THC gummies with better sleep and improvement in nausea as well. He reports dyspnea that is intermittent and intermittent numbness in his right hand only. Tinnitus is intermittent.    REVIEW OF SYSTEMS:   14 point ROS was reviewed and is negative other than as noted above in HPI.       HOME MEDICATIONS:  Current Outpatient Medications   Medication Sig Dispense Refill    acetaminophen (TYLENOL) 325 MG tablet Take 325-650 mg by mouth every 6 hours as needed for mild pain      HYDROmorphone (DILAUDID) 2 MG tablet Take 1-2 tablets (2-4 mg) by mouth every 4 hours as needed for moderate to severe pain (Take Dilaudid 2 mg every 4 hours  as needed for moderate pain, 4 mg every 4 hours as needed for severe pain) 60 tablet 0    lidocaine-prilocaine (EMLA) 2.5-2.5 % external cream Apply topically to port site 30 min prior to port access 30 g 3    LORazepam (ATIVAN) 0.5 MG tablet Take 1-2 tablets (0.5-1 mg) by mouth every 6 hours as needed for anxiety, nausea or vomiting (Breakthrough Nausea / Vomiting) 20 tablet 0    medical cannabis (Patient's own supply) (The purpose of this order is to document that the patient reports taking medical cannabis.  This is not a prescription, and is not used to certify that the patient has a qualifying medical condition.)      morphine (MS CONTIN) 15 MG CR tablet Take 1 tablet (15 mg) by mouth every 12 hours 60 tablet 0    OLANZapine zydis (ZYPREXA) 5 MG ODT Take 1-2 tablets (5-10 mg) by mouth 2 times daily as needed      ondansetron (ZOFRAN) 8 MG tablet Take 1 tablet (8 mg) by mouth every 8 hours as needed for nausea (vomiting) 30 tablet 2    polyethylene glycol (MIRALAX) 17 GM/Dose powder Take 17 g by mouth daily 510 g 0    prochlorperazine (COMPAZINE) 10 MG tablet Take 1 tablet (10 mg) by mouth every 6 hours as needed for nausea or vomiting 30 tablet 2    senna-docusate (SENOKOT-S/PERICOLACE) 8.6-50 MG tablet Take 3-4 tablets by mouth 2 times daily 100 tablet 0    sertraline (ZOLOFT) 100 MG tablet Take 1 tablet (100 mg) by mouth daily 30 tablet 1         ALLERGIES:  Allergies   Allergen Reactions    Sulfamethoxazole-Trimethoprim Rash         PAST MEDICAL HISTORY:  None.      PAST SURGICAL HISTORY:  [place] tooth removal.    SOCIAL HISTORY:  Social History     Socioeconomic History    Marital status: Single     Spouse name: Not on file    Number of children: Not on file    Years of education: Not on file    Highest education level: Not on file   Occupational History    Not on file   Tobacco Use    Smoking status: Former     Types: Cigarettes     Passive exposure: Never    Smokeless tobacco: Never   Substance and Sexual  "Activity    Alcohol use: Not on file    Drug use: Not on file    Sexual activity: Not on file   Other Topics Concern    Not on file   Social History Narrative    Not on file     Social Determinants of Health     Financial Resource Strain: Not on file   Food Insecurity: Not on file   Transportation Needs: Not on file   Physical Activity: Not on file   Stress: Not on file   Social Connections: Not on file   Interpersonal Safety: Not on file   Housing Stability: Not on file         FAMILY HISTORY:  BRCA-2 mutation on paternal side of family. Paternal great aunt with unknown type of cancer. Maternal grandparent with pancreatic cancer.    PHYSICAL EXAM:  Vital signs:  BP (!) 156/65   Pulse 69   Temp 97.6  F (36.4  C)   Resp 18   Ht 1.778 m (5' 10\")   Wt 92.5 kg (204 lb)   SpO2 98%   BMI 29.27 kg/m     ECO  GENERAL/CONSTITUTIONAL: No acute distress.  EYES: No erythema or scleral icterus.  ENT/MOUTH: Neck supple.  LYMPH: No cervical, supraclavicular, axillary, inguinal adenopathy.   RESPIRATORY: Clear to auscultation bilaterally. No crackles or wheezing.   CARDIOVASCULAR: Regular rate and rhythm without murmurs.  GASTROINTESTINAL: No hepatosplenomegaly, masses, or tenderness. No guarding.  No distention.  MUSCULOSKELETAL: Warm and well-perfused, no cyanosis, clubbing, or edema.  NEUROLOGIC: No focal motor deficits. Alert, oriented, answers questions appropriately.  INTEGUMENTARY: No rashes or jaundice.  GAIT: Steady, does not use assistive device       LABS:  As noted in HPI.    PATHOLOGY:  Reviewed as per HPI.    IMAGING:  Reviewed as per HPI.    ASSESSMENT/PLAN:  Jayson Banegas is a 28 year old male with the following issues:  1. Embryonal carcinoma, clinical stage IIIB, [id number], S2 (pre-orchiectomy), intermediate risk  2. Probable metastases to bilateral lungs  3. Diffuse abdominal, pelvic, and retroperitoneal lymphadenopathy with associated pain  --Although 10/11/2023 CT-guided biopsy of lung " nodule negative for malignancy, interval chest CT from 10/23/2023 showed further enlargement of multiple solid lung nodules most consistent with lung metastases.   --10/11/2023 CT-guided biopsy of retroperitoneal lymph node confirmed an embryonal carcinoma, which is nonseminoma type.  --Stage III disease, overall prognosis with 5-year survival rate of 73%, all treatment with curative intent.  --He saw urology with Dr. Gilmore with no indication to do  orchiectomy at present time as no lesions were seen on prior U/S of scrotum.    --Delroy completed sperm banking prior to chemo.  --Advised BEP (bleomycin, etoposide, cisplatin) x 4 cycles (or BEP x 3 and EP for cycle 4): bleomycin days 1, 8, 15; etoposide days 1-5; cisplatin days 1-5 every 21 days per cycle.  --Neulasta or other growth factor support not advised during BEP regimen.  Will treat despite neutropenia in the subsequent cycles.  --Reviewed 12/15/2023 labs which showed WBC 3.4, ANC 1.4, Hgb 12.8, platelets 192,000, sodium 134, creatinine 0.72, ALT 26, AST 24.  --12/15/2023 AFP normal at 2.9, beta-HCG < 3, LDH normal at 188.   --I personally reviewed 12/15/2023 CT C/A/P which showed improvement of pulmonary metastatic disease with decreasing sizes of pulmonary nodules. New focal region of irregular consolidation noted at the left lower lobe is indeterminate but Delroy has no signs of pneumonia.  Significantly improved adenopathy particularly at the abdomen and pelvis with decreasing sizes of isa masses. Given radiographic findings and normalization of tumor markers, he has achieved excellent response to chemo so far.   --Plan for repeat CT chest/abdomen/pelvis post-chemo to assess response.  If persistent disease with any lymph nodes > 2 cm, will have Delroy revisit Dr. Gilmore for RPLND/surgical resection.  Otherwise, surveillance post-chemo would be every 2 months year 1, every 4 months year 2, every 6 months years 3-5, annually after year 5 and check serum HCG  and AFP, chest x-ray.  --Refilled Dilaudid today.    4. Dyspnea  --Will arrange for pulmonary function testing.    5. Tinnitus  --Intermittent.  --Already scheduled for audiology exam.    6. GERD  --Left upper quadrant and epigastric pain likely related to GERD.  --Will start pantoprazole.    Heather Wick MD  Hematology/Oncology  [facility]    Total time spent today: 40 minutes in chart review, patient evaluation, counseling, documentation, test and/or medication/prescription orders, and extensive coordination of care.

## 2023-12-15 ENCOUNTER — ANCILLARY PROCEDURE (OUTPATIENT)
Dept: CT IMAGING | Facility: CLINIC | Age: 28
End: 2023-12-15
Attending: INTERNAL MEDICINE
Payer: COMMERCIAL

## 2023-12-15 ENCOUNTER — LAB (OUTPATIENT)
Dept: INFUSION THERAPY | Facility: CLINIC | Age: 28
End: 2023-12-15
Attending: INTERNAL MEDICINE
Payer: COMMERCIAL

## 2023-12-15 DIAGNOSIS — C77.8 MALIGNANT NEOPLASM METASTATIC TO LYMPH NODES OF MULTIPLE SITES (H): ICD-10-CM

## 2023-12-15 DIAGNOSIS — C78.02 MALIGNANT NEOPLASM METASTATIC TO BOTH LUNGS (H): ICD-10-CM

## 2023-12-15 DIAGNOSIS — C80.1 EMBRYONAL CARCINOMA (H): ICD-10-CM

## 2023-12-15 DIAGNOSIS — C78.01 MALIGNANT NEOPLASM METASTATIC TO BOTH LUNGS (H): ICD-10-CM

## 2023-12-15 DIAGNOSIS — C80.1 EMBRYONAL CARCINOMA (H): Primary | ICD-10-CM

## 2023-12-15 LAB
AFP SERPL-MCNC: 2.9 NG/ML
ALBUMIN SERPL BCG-MCNC: 4.2 G/DL (ref 3.5–5.2)
ALP SERPL-CCNC: 48 U/L (ref 40–150)
ALT SERPL W P-5'-P-CCNC: 26 U/L (ref 0–70)
ANION GAP SERPL CALCULATED.3IONS-SCNC: 9 MMOL/L (ref 7–15)
AST SERPL W P-5'-P-CCNC: 24 U/L (ref 0–45)
BASOPHILS # BLD AUTO: 0 10E3/UL (ref 0–0.2)
BASOPHILS NFR BLD AUTO: 1 %
BILIRUB SERPL-MCNC: 0.2 MG/DL
BUN SERPL-MCNC: 9.3 MG/DL (ref 6–20)
CALCIUM SERPL-MCNC: 9.4 MG/DL (ref 8.6–10)
CHLORIDE SERPL-SCNC: 97 MMOL/L (ref 98–107)
CREAT SERPL-MCNC: 0.72 MG/DL (ref 0.67–1.17)
DEPRECATED HCO3 PLAS-SCNC: 28 MMOL/L (ref 22–29)
EGFRCR SERPLBLD CKD-EPI 2021: >90 ML/MIN/1.73M2
EOSINOPHIL # BLD AUTO: 0 10E3/UL (ref 0–0.7)
EOSINOPHIL NFR BLD AUTO: 1 %
ERYTHROCYTE [DISTWIDTH] IN BLOOD BY AUTOMATED COUNT: 15 % (ref 10–15)
GLUCOSE SERPL-MCNC: 112 MG/DL (ref 70–99)
HCT VFR BLD AUTO: 37.7 % (ref 40–53)
HGB BLD-MCNC: 12.8 G/DL (ref 13.3–17.7)
IMM GRANULOCYTES # BLD: 0 10E3/UL
IMM GRANULOCYTES NFR BLD: 0 %
LDH SERPL L TO P-CCNC: 188 U/L (ref 0–250)
LYMPHOCYTES # BLD AUTO: 1.4 10E3/UL (ref 0.8–5.3)
LYMPHOCYTES NFR BLD AUTO: 42 %
MAGNESIUM SERPL-MCNC: 2.1 MG/DL (ref 1.7–2.3)
MCH RBC QN AUTO: 29 PG (ref 26.5–33)
MCHC RBC AUTO-ENTMCNC: 34 G/DL (ref 31.5–36.5)
MCV RBC AUTO: 85 FL (ref 78–100)
MONOCYTES # BLD AUTO: 0.5 10E3/UL (ref 0–1.3)
MONOCYTES NFR BLD AUTO: 16 %
NEUTROPHILS # BLD AUTO: 1.4 10E3/UL (ref 1.6–8.3)
NEUTROPHILS NFR BLD AUTO: 40 %
NRBC # BLD AUTO: 0 10E3/UL
NRBC BLD AUTO-RTO: 0 /100
PLATELET # BLD AUTO: 192 10E3/UL (ref 150–450)
POTASSIUM SERPL-SCNC: 4.1 MMOL/L (ref 3.4–5.3)
PROT SERPL-MCNC: 7.3 G/DL (ref 6.4–8.3)
RBC # BLD AUTO: 4.42 10E6/UL (ref 4.4–5.9)
SODIUM SERPL-SCNC: 134 MMOL/L (ref 135–145)
WBC # BLD AUTO: 3.4 10E3/UL (ref 4–11)

## 2023-12-15 PROCEDURE — 80053 COMPREHEN METABOLIC PANEL: CPT | Performed by: INTERNAL MEDICINE

## 2023-12-15 PROCEDURE — 250N000011 HC RX IP 250 OP 636: Performed by: INTERNAL MEDICINE

## 2023-12-15 PROCEDURE — 250N000011 HC RX IP 250 OP 636: Mod: JZ | Performed by: INTERNAL MEDICINE

## 2023-12-15 PROCEDURE — 82105 ALPHA-FETOPROTEIN SERUM: CPT | Performed by: INTERNAL MEDICINE

## 2023-12-15 PROCEDURE — 36591 DRAW BLOOD OFF VENOUS DEVICE: CPT | Performed by: INTERNAL MEDICINE

## 2023-12-15 PROCEDURE — 83735 ASSAY OF MAGNESIUM: CPT | Performed by: INTERNAL MEDICINE

## 2023-12-15 PROCEDURE — 250N000009 HC RX 250: Performed by: INTERNAL MEDICINE

## 2023-12-15 PROCEDURE — 85025 COMPLETE CBC W/AUTO DIFF WBC: CPT | Performed by: INTERNAL MEDICINE

## 2023-12-15 PROCEDURE — 84702 CHORIONIC GONADOTROPIN TEST: CPT | Performed by: INTERNAL MEDICINE

## 2023-12-15 PROCEDURE — 83615 LACTATE (LD) (LDH) ENZYME: CPT | Performed by: INTERNAL MEDICINE

## 2023-12-15 PROCEDURE — 74177 CT ABD & PELVIS W/CONTRAST: CPT

## 2023-12-15 RX ORDER — HEPARIN SODIUM,PORCINE 10 UNIT/ML
5-20 VIAL (ML) INTRAVENOUS DAILY PRN
Status: CANCELLED | OUTPATIENT
Start: 2023-12-15

## 2023-12-15 RX ORDER — HEPARIN SODIUM (PORCINE) LOCK FLUSH IV SOLN 100 UNIT/ML 100 UNIT/ML
5 SOLUTION INTRAVENOUS
Status: DISCONTINUED | OUTPATIENT
Start: 2023-12-15 | End: 2023-12-15 | Stop reason: HOSPADM

## 2023-12-15 RX ORDER — HEPARIN SODIUM (PORCINE) LOCK FLUSH IV SOLN 100 UNIT/ML 100 UNIT/ML
5 SOLUTION INTRAVENOUS ONCE
Status: COMPLETED | OUTPATIENT
Start: 2023-12-15 | End: 2023-12-15

## 2023-12-15 RX ORDER — HEPARIN SODIUM (PORCINE) LOCK FLUSH IV SOLN 100 UNIT/ML 100 UNIT/ML
5 SOLUTION INTRAVENOUS
Status: CANCELLED | OUTPATIENT
Start: 2023-12-15

## 2023-12-15 RX ORDER — IOPAMIDOL 755 MG/ML
90 INJECTION, SOLUTION INTRAVASCULAR ONCE
Status: COMPLETED | OUTPATIENT
Start: 2023-12-15 | End: 2023-12-15

## 2023-12-15 RX ADMIN — SODIUM CHLORIDE 40 ML: 9 INJECTION, SOLUTION INTRAVENOUS at 10:07

## 2023-12-15 RX ADMIN — Medication 5 ML: at 10:50

## 2023-12-15 RX ADMIN — HEPARIN SODIUM (PORCINE) LOCK FLUSH IV SOLN 100 UNIT/ML 5 ML: 100 SOLUTION at 10:07

## 2023-12-15 RX ADMIN — IOPAMIDOL 90 ML: 755 INJECTION, SOLUTION INTRAVENOUS at 10:06

## 2023-12-15 NOTE — PROGRESS NOTES
Nursing Note:  Jayson Banegas presents today for Port Labs.    Patient seen by provider today: No   present during visit today: Not Applicable.    Note: N/A.    Intravenous Access:  Labs drawn without difficulty.  Implanted Port.    Discharge Plan:   Patient was discharged.    Tomás Velez RN

## 2023-12-18 ENCOUNTER — INFUSION THERAPY VISIT (OUTPATIENT)
Dept: INFUSION THERAPY | Facility: CLINIC | Age: 28
End: 2023-12-18
Attending: INTERNAL MEDICINE
Payer: COMMERCIAL

## 2023-12-18 ENCOUNTER — ONCOLOGY VISIT (OUTPATIENT)
Dept: ONCOLOGY | Facility: CLINIC | Age: 28
End: 2023-12-18
Attending: INTERNAL MEDICINE
Payer: COMMERCIAL

## 2023-12-18 VITALS
DIASTOLIC BLOOD PRESSURE: 65 MMHG | TEMPERATURE: 97.6 F | BODY MASS INDEX: 29.2 KG/M2 | SYSTOLIC BLOOD PRESSURE: 156 MMHG | OXYGEN SATURATION: 98 % | HEIGHT: 70 IN | RESPIRATION RATE: 18 BRPM | HEART RATE: 69 BPM | WEIGHT: 204 LBS

## 2023-12-18 VITALS
DIASTOLIC BLOOD PRESSURE: 65 MMHG | HEART RATE: 69 BPM | OXYGEN SATURATION: 98 % | BODY MASS INDEX: 29.33 KG/M2 | TEMPERATURE: 97.6 F | WEIGHT: 204.4 LBS | RESPIRATION RATE: 18 BRPM | SYSTOLIC BLOOD PRESSURE: 156 MMHG

## 2023-12-18 DIAGNOSIS — C77.8 MALIGNANT NEOPLASM METASTATIC TO LYMPH NODES OF MULTIPLE SITES (H): ICD-10-CM

## 2023-12-18 DIAGNOSIS — R06.02 SHORTNESS OF BREATH: ICD-10-CM

## 2023-12-18 DIAGNOSIS — C80.1 EMBRYONAL CARCINOMA (H): Primary | ICD-10-CM

## 2023-12-18 DIAGNOSIS — C78.02 MALIGNANT NEOPLASM METASTATIC TO BOTH LUNGS (H): ICD-10-CM

## 2023-12-18 DIAGNOSIS — C78.01 MALIGNANT NEOPLASM METASTATIC TO BOTH LUNGS (H): ICD-10-CM

## 2023-12-18 DIAGNOSIS — G89.3 CANCER ASSOCIATED PAIN: ICD-10-CM

## 2023-12-18 DIAGNOSIS — K21.00 GASTROESOPHAGEAL REFLUX DISEASE WITH ESOPHAGITIS WITHOUT HEMORRHAGE: ICD-10-CM

## 2023-12-18 DIAGNOSIS — H93.13 TINNITUS, BILATERAL: ICD-10-CM

## 2023-12-18 LAB — HCG-TM SERPL-ACNC: <3 IU/L

## 2023-12-18 PROCEDURE — 258N000003 HC RX IP 258 OP 636: Performed by: INTERNAL MEDICINE

## 2023-12-18 PROCEDURE — 96367 TX/PROPH/DG ADDL SEQ IV INF: CPT

## 2023-12-18 PROCEDURE — 99215 OFFICE O/P EST HI 40 MIN: CPT | Performed by: INTERNAL MEDICINE

## 2023-12-18 PROCEDURE — 96411 CHEMO IV PUSH ADDL DRUG: CPT

## 2023-12-18 PROCEDURE — 250N000011 HC RX IP 250 OP 636: Performed by: INTERNAL MEDICINE

## 2023-12-18 PROCEDURE — 99213 OFFICE O/P EST LOW 20 MIN: CPT | Mod: 25 | Performed by: INTERNAL MEDICINE

## 2023-12-18 PROCEDURE — 96375 TX/PRO/DX INJ NEW DRUG ADDON: CPT

## 2023-12-18 PROCEDURE — 96361 HYDRATE IV INFUSION ADD-ON: CPT

## 2023-12-18 PROCEDURE — 96413 CHEMO IV INFUSION 1 HR: CPT

## 2023-12-18 PROCEDURE — 96417 CHEMO IV INFUS EACH ADDL SEQ: CPT

## 2023-12-18 RX ORDER — HYDROMORPHONE HYDROCHLORIDE 2 MG/1
2-4 TABLET ORAL EVERY 4 HOURS PRN
Qty: 60 TABLET | Refills: 0 | Status: SHIPPED | OUTPATIENT
Start: 2023-12-18 | End: 2024-01-04

## 2023-12-18 RX ORDER — PANTOPRAZOLE SODIUM 40 MG/1
40 TABLET, DELAYED RELEASE ORAL DAILY
Qty: 90 TABLET | Refills: 3 | Status: SHIPPED | OUTPATIENT
Start: 2023-12-18 | End: 2024-01-14

## 2023-12-18 RX ORDER — HEPARIN SODIUM,PORCINE 10 UNIT/ML
5-20 VIAL (ML) INTRAVENOUS DAILY PRN
Status: DISCONTINUED | OUTPATIENT
Start: 2023-12-18 | End: 2023-12-18 | Stop reason: HOSPADM

## 2023-12-18 RX ORDER — PALONOSETRON 0.05 MG/ML
0.25 INJECTION, SOLUTION INTRAVENOUS ONCE
Status: COMPLETED | OUTPATIENT
Start: 2023-12-18 | End: 2023-12-18

## 2023-12-18 RX ORDER — SODIUM CHLORIDE 9 MG/ML
1000 INJECTION, SOLUTION INTRAVENOUS ONCE
Status: COMPLETED | OUTPATIENT
Start: 2023-12-18 | End: 2023-12-18

## 2023-12-18 RX ADMIN — HEPARIN, PORCINE (PF) 10 UNIT/ML INTRAVENOUS SYRINGE 5 ML: at 12:47

## 2023-12-18 RX ADMIN — PALONOSETRON HYDROCHLORIDE 0.25 MG: 0.25 INJECTION INTRAVENOUS at 09:12

## 2023-12-18 RX ADMIN — BLEOMYCIN 30 UNITS: 30 POWDER, FOR SOLUTION INTRAMUSCULAR; INTRAPLEURAL; INTRAVENOUS; SUBCUTANEOUS at 12:30

## 2023-12-18 RX ADMIN — CISPLATIN 40 MG: 1 INJECTION, SOLUTION INTRAVENOUS at 10:58

## 2023-12-18 RX ADMIN — SODIUM CHLORIDE 1000 ML: 9 INJECTION, SOLUTION INTRAVENOUS at 08:45

## 2023-12-18 RX ADMIN — FOSAPREPITANT: 150 INJECTION, POWDER, LYOPHILIZED, FOR SOLUTION INTRAVENOUS at 09:12

## 2023-12-18 RX ADMIN — ETOPOSIDE 210 MG: 20 INJECTION INTRAVENOUS at 09:40

## 2023-12-18 ASSESSMENT — PAIN SCALES - GENERAL: PAINLEVEL: MILD PAIN (3)

## 2023-12-18 NOTE — PROGRESS NOTES
"Oncology Rooming Note    December 18, 2023 10:48 AM   Jayson Banegas is a 28 year old male who presents for:    Chief Complaint   Patient presents with    Oncology Clinic Visit     Initial Vitals: BP (!) 156/65   Pulse 69   Temp 97.6  F (36.4  C)   Resp 18   Ht 1.778 m (5' 10\")   Wt 92.5 kg (204 lb)   SpO2 98%   BMI 29.27 kg/m   Estimated body mass index is 29.27 kg/m  as calculated from the following:    Height as of this encounter: 1.778 m (5' 10\").    Weight as of this encounter: 92.5 kg (204 lb). Body surface area is 2.14 meters squared.  Data Unavailable Comment: Data Unavailable   No LMP for male patient.  Allergies reviewed: Yes  Medications reviewed: Yes    Medications: Medication refills not needed today.  Pharmacy name entered into [employer]: [id number] - [place], [place] - [address] [facility]        Corrina Horowitz MA            "

## 2023-12-18 NOTE — PATIENT INSTRUCTIONS
Arrange for NP visit 1/8/2024.  Arrange for pulmonary function test.  RTC MD in first 2 weeks of 2/2024 with CT chest/abdomen/pelvis and lab draw prior to visit.

## 2023-12-18 NOTE — LETTER
12/18/2023         RE: Jayson Banegas  [address]  [address]  [facility] [address]        Dear Colleague,    Thank you for referring your patient, Jayson Banegas, to the [facility]. Please see a copy of my visit note below.    [facility]    Hematology/Oncology Established Patient Note      Today's Date: 12/18/23    Reason for visit: Embryonal carcinoma.    HISTORY OF PRESENT ILLNESS: Jayson Banegas is a 28 year old male with history of alcoholism and nicotine dependence in remission, depression, anxiety, and PTSD who presents with the following oncologic history:  1. 9/2023: Presented to PCP with left lower quadrant abdominal pain.  Given 2 weeks of antibiotics with no improvement.  2. 9/29/2023: CT abdomen/pelvis with contrast at [facility] showed new 10 mm noncalcified left lower lobe lung nodule; no effusion; diffuse mild/moderate abdominal/pelvic retroperitoneal lymphadenopathy, largest nodes 3.0 x 1.8 cm   retrocrural, 3.0 x 1.8 cm left periaortic, 2.5 x 2.3 cm left   periaortic, 4.2 x 2.2 cm left external iliac, 2.0 x 1.4 cm right internal iliac, 2.7 x 2.2 cm left internal iliac, and 2.8 x 1.7 cm  left inguinal. No right inguinal lymphadenopathy. Multiple small, mildly enlarged mesenteric root lymph nodes. Mild haziness mesenteric root and retroperitoneal fat. Right testicle now located in scrotum; left testicle not included in field of view; no bone lesions.  3. 9/29/2023: U/S of scrotum with Doppler showed no evidence for intratesticular mass; small benign right epididymal head cyst; small left varicocele. CBC normal except monocytes slightly elevated at 1.1; normal BMP; CRP slightly elevated at 1.04.  4. 10/02/2023: CT chest with contrast showed multiple indeterminate well-circumscribed solid pulmonary nodules in the right lung; 3 mm nodule anterior right upper lobe; 8 mm solid pulmonary nodule in right middle lobe; 6  mm nodule in lateral right lower lobe; additional 10 mm right lower lobe lung nodule; no infiltrates or effusions; unchanged upper abdominal lymphadenopathy.  5. 10/11/2023: CT-guided biopsy of retroperitoneal lymph node at [facility] showed embryonal carcinoma, cannot rule out mixed type. Biopsy of 6 mm of right lung biopsy was negative for malignancy. LDH elevated at 1643. Beta-HCG = 856, AFP = 34. Biopsy specimen sent to [facility] for review of pathology.  6. 10/23/2023: Presented with chest pain.  CT chest at [facility] showed no PE. Multiple pulmonary nodules enlarged since 10/02/2023. Prior right lower lobe lung nodule 7 mm, now 13 mm, prior 8 mm right middle lobe nodule now 12 mm; prior 6 mm right lower lobe lung nodule now 12 mm; new 2 mm right lower lobe lung nodule; enlarging left upper quadrant mass arising from or engulfing left adrenal gland 2.2 x 4.6 cm; upper abdominal lymphadenopathy have enlarged.  7. 10/26/2023: PET/CT scan showed multiple hypermetabolic pulmonary nodules most likely reflecting metastatic disease. Abnormal hypermetabolic left retrocrural lymph node. Otherwise no abnormal adenopathy in the neck or chest. Diffuse hypermetabolic lymphadenopathy throughout the retroperitoneum, mesentery, and bilateral iliac chains left greater than right as well as in the left groin region.   8.  11/1/2023: Started BEP - bleomycin, etoposide, cisplatin.    INTERVAL HISTORY:  Delroy reports dry eyes, daily nausea that does respond to prochlorperazine and olanzapine.  He reports now upper abdominal area that responds to Dilaudid. He uses THC gummies with better sleep and improvement in nausea as well. He reports dyspnea that is intermittent and intermittent numbness in his right hand only. Tinnitus is intermittent.    REVIEW OF SYSTEMS:   14 point ROS was reviewed and is negative other than as noted above in HPI.       HOME MEDICATIONS:  Current Outpatient Medications   Medication Sig  Dispense Refill     acetaminophen (TYLENOL) 325 MG tablet Take 325-650 mg by mouth every 6 hours as needed for mild pain       HYDROmorphone (DILAUDID) 2 MG tablet Take 1-2 tablets (2-4 mg) by mouth every 4 hours as needed for moderate to severe pain (Take Dilaudid 2 mg every 4 hours as needed for moderate pain, 4 mg every 4 hours as needed for severe pain) 60 tablet 0     lidocaine-prilocaine (EMLA) 2.5-2.5 % external cream Apply topically to port site 30 min prior to port access 30 g 3     LORazepam (ATIVAN) 0.5 MG tablet Take 1-2 tablets (0.5-1 mg) by mouth every 6 hours as needed for anxiety, nausea or vomiting (Breakthrough Nausea / Vomiting) 20 tablet 0     medical cannabis (Patient's own supply) (The purpose of this order is to document that the patient reports taking medical cannabis.  This is not a prescription, and is not used to certify that the patient has a qualifying medical condition.)       morphine (MS CONTIN) 15 MG CR tablet Take 1 tablet (15 mg) by mouth every 12 hours 60 tablet 0     OLANZapine zydis (ZYPREXA) 5 MG ODT Take 1-2 tablets (5-10 mg) by mouth 2 times daily as needed       ondansetron (ZOFRAN) 8 MG tablet Take 1 tablet (8 mg) by mouth every 8 hours as needed for nausea (vomiting) 30 tablet 2     polyethylene glycol (MIRALAX) 17 GM/Dose powder Take 17 g by mouth daily 510 g 0     prochlorperazine (COMPAZINE) 10 MG tablet Take 1 tablet (10 mg) by mouth every 6 hours as needed for nausea or vomiting 30 tablet 2     senna-docusate (SENOKOT-S/PERICOLACE) 8.6-50 MG tablet Take 3-4 tablets by mouth 2 times daily 100 tablet 0     sertraline (ZOLOFT) 100 MG tablet Take 1 tablet (100 mg) by mouth daily 30 tablet 1         ALLERGIES:  Allergies   Allergen Reactions     Sulfamethoxazole-Trimethoprim Rash         PAST MEDICAL HISTORY:  None.      PAST SURGICAL HISTORY:  [place] tooth removal.    SOCIAL HISTORY:  Social History     Socioeconomic History     Marital status: Single     Spouse name: Not  "on file     Number of children: Not on file     Years of education: Not on file     Highest education level: Not on file   Occupational History     Not on file   Tobacco Use     Smoking status: Former     Types: Cigarettes     Passive exposure: Never     Smokeless tobacco: Never   Substance and Sexual Activity     Alcohol use: Not on file     Drug use: Not on file     Sexual activity: Not on file   Other Topics Concern     Not on file   Social History Narrative     Not on file     Social Determinants of Health     Financial Resource Strain: Not on file   Food Insecurity: Not on file   Transportation Needs: Not on file   Physical Activity: Not on file   Stress: Not on file   Social Connections: Not on file   Interpersonal Safety: Not on file   Housing Stability: Not on file         FAMILY HISTORY:  BRCA-2 mutation on paternal side of family. Paternal great aunt with unknown type of cancer. Maternal grandparent with pancreatic cancer.    PHYSICAL EXAM:  Vital signs:  BP (!) 156/65   Pulse 69   Temp 97.6  F (36.4  C)   Resp 18   Ht 1.778 m (5' 10\")   Wt 92.5 kg (204 lb)   SpO2 98%   BMI 29.27 kg/m     ECO  GENERAL/CONSTITUTIONAL: No acute distress.  EYES: No erythema or scleral icterus.  ENT/MOUTH: Neck supple.  LYMPH: No cervical, supraclavicular, axillary, inguinal adenopathy.   RESPIRATORY: Clear to auscultation bilaterally. No crackles or wheezing.   CARDIOVASCULAR: Regular rate and rhythm without murmurs.  GASTROINTESTINAL: No hepatosplenomegaly, masses, or tenderness. No guarding.  No distention.  MUSCULOSKELETAL: Warm and well-perfused, no cyanosis, clubbing, or edema.  NEUROLOGIC: No focal motor deficits. Alert, oriented, answers questions appropriately.  INTEGUMENTARY: No rashes or jaundice.  GAIT: Steady, does not use assistive device       LABS:  As noted in HPI.    PATHOLOGY:  Reviewed as per HPI.    IMAGING:  Reviewed as per HPI.    ASSESSMENT/PLAN:  Jayson Banegas is a 28 year old male " with the following issues:  1. Embryonal carcinoma, clinical stage IIIB, [id number], S2 (pre-orchiectomy), intermediate risk  2. Probable metastases to bilateral lungs  3. Diffuse abdominal, pelvic, and retroperitoneal lymphadenopathy with associated pain  --Although 10/11/2023 CT-guided biopsy of lung nodule negative for malignancy, interval chest CT from 10/23/2023 showed further enlargement of multiple solid lung nodules most consistent with lung metastases.   --10/11/2023 CT-guided biopsy of retroperitoneal lymph node confirmed an embryonal carcinoma, which is nonseminoma type.  --Stage III disease, overall prognosis with 5-year survival rate of 73%, all treatment with curative intent.  --He saw urology with Dr. Gilmore with no indication to do  orchiectomy at present time as no lesions were seen on prior U/S of scrotum.    --Delroy completed sperm banking prior to chemo.  --Advised BEP (bleomycin, etoposide, cisplatin) x 4 cycles (or BEP x 3 and EP for cycle 4): bleomycin days 1, 8, 15; etoposide days 1-5; cisplatin days 1-5 every 21 days per cycle.  --Neulasta or other growth factor support not advised during BEP regimen.  Will treat despite neutropenia in the subsequent cycles.  --Reviewed 12/15/2023 labs which showed WBC 3.4, ANC 1.4, Hgb 12.8, platelets 192,000, sodium 134, creatinine 0.72, ALT 26, AST 24.  --12/15/2023 AFP normal at 2.9, beta-HCG < 3, LDH normal at 188.   --I personally reviewed 12/15/2023 CT C/A/P which showed improvement of pulmonary metastatic disease with decreasing sizes of pulmonary nodules. New focal region of irregular consolidation noted at the left lower lobe is indeterminate but Delroy has no signs of pneumonia.  Significantly improved adenopathy particularly at the abdomen and pelvis with decreasing sizes of isa masses. Given radiographic findings and normalization of tumor markers, he has achieved excellent response to chemo so far.   --Plan for repeat CT chest/abdomen/pelvis  "post-chemo to assess response.  If persistent disease with any lymph nodes > 2 cm, will have Delroy revisit Dr. Gilmore for RPLND/surgical resection.  Otherwise, surveillance post-chemo would be every 2 months year 1, every 4 months year 2, every 6 months years 3-5, annually after year 5 and check serum HCG and AFP, chest x-ray.  --Refilled Dilaudid today.    4. Dyspnea  --Will arrange for pulmonary function testing.    5. Tinnitus  --Intermittent.  --Already scheduled for audiology exam.    6. GERD  --Left upper quadrant and epigastric pain likely related to GERD.  --Will start pantoprazole.    Heather Wick MD  Hematology/Oncology  [facility]    Total time spent today: 40 minutes in chart review, patient evaluation, counseling, documentation, test and/or medication/prescription orders, and extensive coordination of care.      Oncology Rooming Note    December 18, 2023 10:48 AM   Jayson Banegas is a 28 year old male who presents for:    Chief Complaint   Patient presents with     Oncology Clinic Visit     Initial Vitals: BP (!) 156/65   Pulse 69   Temp 97.6  F (36.4  C)   Resp 18   Ht 1.778 m (5' 10\")   Wt 92.5 kg (204 lb)   SpO2 98%   BMI 29.27 kg/m   Estimated body mass index is 29.27 kg/m  as calculated from the following:    Height as of this encounter: 1.778 m (5' 10\").    Weight as of this encounter: 92.5 kg (204 lb). Body surface area is 2.14 meters squared.  Data Unavailable Comment: Data Unavailable   No LMP for male patient.  Allergies reviewed: Yes  Medications reviewed: Yes    Medications: Medication refills not needed today.  Pharmacy name entered into [employer]: [phone] - [place], [place] - [address]         Corrina Horowitz MA              Again, thank you for allowing me to participate in the care of your patient.        Sincerely,        Heather Wick MD  "

## 2023-12-18 NOTE — PROGRESS NOTES
Infusion Nursing Note:  Jayson Banegas presents today for C3D1 bleomycin, etoposide and cisplatin.    Patient seen by provider today: Yes: Dr. Wick   present during visit today: Not Applicable.    Note: N/A.      Intravenous Access:  Implanted Port.    Treatment Conditions:  Lab Results   Component Value Date    HGB 12.8 (L) 12/15/2023    WBC 3.4 (L) 12/15/2023    ANEUTAUTO 1.4 (L) 12/15/2023     12/15/2023        Lab Results   Component Value Date     (L) 12/15/2023    POTASSIUM 4.1 12/15/2023    MAG 2.1 12/15/2023    CR 0.72 12/15/2023    ALEX 9.4 12/15/2023    BILITOTAL 0.2 12/15/2023    ALBUMIN 4.2 12/15/2023    ALT 26 12/15/2023    AST 24 12/15/2023       Results reviewed, labs MET treatment parameters, ok to proceed with treatment.      Post Infusion Assessment:  Patient tolerated infusion without incident.  Blood return noted pre and post infusion.  Site patent and intact, free from redness, edema or discomfort.  No evidence of extravasations.       Discharge Plan:   [facility]  Patient will return 12/19 for next appointment.   Patient discharged in stable condition accompanied by: father.  Departure Mode: Ambulatory.      Shahid Dickey RN

## 2023-12-19 ENCOUNTER — INFUSION THERAPY VISIT (OUTPATIENT)
Dept: INFUSION THERAPY | Facility: CLINIC | Age: 28
End: 2023-12-19
Attending: INTERNAL MEDICINE
Payer: COMMERCIAL

## 2023-12-19 VITALS
OXYGEN SATURATION: 96 % | TEMPERATURE: 97.3 F | HEART RATE: 100 BPM | SYSTOLIC BLOOD PRESSURE: 116 MMHG | RESPIRATION RATE: 16 BRPM | DIASTOLIC BLOOD PRESSURE: 67 MMHG

## 2023-12-19 DIAGNOSIS — C80.1 EMBRYONAL CARCINOMA (H): Primary | ICD-10-CM

## 2023-12-19 PROCEDURE — 250N000011 HC RX IP 250 OP 636: Performed by: INTERNAL MEDICINE

## 2023-12-19 PROCEDURE — 96367 TX/PROPH/DG ADDL SEQ IV INF: CPT

## 2023-12-19 PROCEDURE — 96417 CHEMO IV INFUS EACH ADDL SEQ: CPT

## 2023-12-19 PROCEDURE — 258N000003 HC RX IP 258 OP 636: Performed by: INTERNAL MEDICINE

## 2023-12-19 PROCEDURE — 96413 CHEMO IV INFUSION 1 HR: CPT

## 2023-12-19 RX ORDER — HEPARIN SODIUM,PORCINE 10 UNIT/ML
5-20 VIAL (ML) INTRAVENOUS DAILY PRN
Status: DISCONTINUED | OUTPATIENT
Start: 2023-12-19 | End: 2023-12-19 | Stop reason: HOSPADM

## 2023-12-19 RX ORDER — SODIUM CHLORIDE 9 MG/ML
1000 INJECTION, SOLUTION INTRAVENOUS ONCE
Status: COMPLETED | OUTPATIENT
Start: 2023-12-19 | End: 2023-12-19

## 2023-12-19 RX ADMIN — HEPARIN, PORCINE (PF) 10 UNIT/ML INTRAVENOUS SYRINGE 5 ML: at 13:41

## 2023-12-19 RX ADMIN — CISPLATIN 40 MG: 1 INJECTION, SOLUTION INTRAVENOUS at 12:40

## 2023-12-19 RX ADMIN — SODIUM CHLORIDE 1000 ML: 9 INJECTION, SOLUTION INTRAVENOUS at 10:36

## 2023-12-19 RX ADMIN — ETOPOSIDE 210 MG: 20 INJECTION INTRAVENOUS at 10:58

## 2023-12-19 RX ADMIN — DEXAMETHASONE SODIUM PHOSPHATE 12 MG: 10 INJECTION, SOLUTION INTRAMUSCULAR; INTRAVENOUS at 10:38

## 2023-12-19 ASSESSMENT — PAIN SCALES - GENERAL: PAINLEVEL: MILD PAIN (3)

## 2023-12-19 NOTE — PROGRESS NOTES
Infusion Nursing Note:  Jayson Banegas presents today for C3D2 Etoposide/Cisplatin.    Patient seen by provider today: No   present during visit today: Not Applicable.    Note: Pt reports he is tolerating chemo well. Mild nausea but controlled well with home antiemetics.       Intravenous Access:  Implanted Port.    Treatment Conditions:  Lab Results   Component Value Date    HGB 12.8 (L) 12/15/2023    WBC 3.4 (L) 12/15/2023    ANEUTAUTO 1.4 (L) 12/15/2023     12/15/2023        Lab Results   Component Value Date     (L) 12/15/2023    POTASSIUM 4.1 12/15/2023    MAG 2.1 12/15/2023    CR 0.72 12/15/2023    ALEX 9.4 12/15/2023    BILITOTAL 0.2 12/15/2023    ALBUMIN 4.2 12/15/2023    ALT 26 12/15/2023    AST 24 12/15/2023       Results reviewed, labs MET treatment parameters, ok to proceed with treatment.      Post Infusion Assessment:  Patient tolerated infusion without incident.  Blood return noted pre and post infusion.  Site patent and intact, free from redness, edema or discomfort.  No evidence of extravasations.  Access left in place. Dressing changed.       Discharge Plan:   Discharge instructions reviewed with: Patient and Family.  Patient and/or family verbalized understanding of discharge instructions and all questions answered.  AVS to patient via [employer].  Patient will return 12/20/23 for next appointment.   Patient discharged in stable condition accompanied by: self and father.  Departure Mode: Ambulatory.      Jorge Holguin RN

## 2023-12-20 ENCOUNTER — INFUSION THERAPY VISIT (OUTPATIENT)
Dept: INFUSION THERAPY | Facility: CLINIC | Age: 28
End: 2023-12-20
Attending: INTERNAL MEDICINE
Payer: COMMERCIAL

## 2023-12-20 VITALS
TEMPERATURE: 97.6 F | OXYGEN SATURATION: 96 % | DIASTOLIC BLOOD PRESSURE: 86 MMHG | HEART RATE: 96 BPM | RESPIRATION RATE: 16 BRPM | SYSTOLIC BLOOD PRESSURE: 126 MMHG

## 2023-12-20 DIAGNOSIS — C80.1 EMBRYONAL CARCINOMA (H): ICD-10-CM

## 2023-12-20 DIAGNOSIS — C80.1 EMBRYONAL CARCINOMA (H): Primary | ICD-10-CM

## 2023-12-20 PROCEDURE — 96417 CHEMO IV INFUS EACH ADDL SEQ: CPT

## 2023-12-20 PROCEDURE — 96413 CHEMO IV INFUSION 1 HR: CPT

## 2023-12-20 PROCEDURE — 96361 HYDRATE IV INFUSION ADD-ON: CPT

## 2023-12-20 PROCEDURE — 250N000011 HC RX IP 250 OP 636: Performed by: INTERNAL MEDICINE

## 2023-12-20 PROCEDURE — 258N000003 HC RX IP 258 OP 636: Performed by: INTERNAL MEDICINE

## 2023-12-20 PROCEDURE — 96367 TX/PROPH/DG ADDL SEQ IV INF: CPT

## 2023-12-20 RX ORDER — HEPARIN SODIUM,PORCINE 10 UNIT/ML
5-20 VIAL (ML) INTRAVENOUS DAILY PRN
Status: DISCONTINUED | OUTPATIENT
Start: 2023-12-20 | End: 2023-12-20 | Stop reason: HOSPADM

## 2023-12-20 RX ORDER — LORAZEPAM 0.5 MG/1
.5-1 TABLET ORAL EVERY 6 HOURS PRN
Qty: 20 TABLET | Refills: 0 | Status: SHIPPED | OUTPATIENT
Start: 2023-12-20 | End: 2024-01-02

## 2023-12-20 RX ORDER — SODIUM CHLORIDE 9 MG/ML
1000 INJECTION, SOLUTION INTRAVENOUS ONCE
Status: COMPLETED | OUTPATIENT
Start: 2023-12-20 | End: 2023-12-20

## 2023-12-20 RX ADMIN — DEXAMETHASONE SODIUM PHOSPHATE 12 MG: 10 INJECTION, SOLUTION INTRAMUSCULAR; INTRAVENOUS at 10:37

## 2023-12-20 RX ADMIN — ETOPOSIDE 210 MG: 20 INJECTION INTRAVENOUS at 11:11

## 2023-12-20 RX ADMIN — SODIUM CHLORIDE 1000 ML: 9 INJECTION, SOLUTION INTRAVENOUS at 10:33

## 2023-12-20 RX ADMIN — CISPLATIN 40 MG: 1 INJECTION, SOLUTION INTRAVENOUS at 12:56

## 2023-12-20 NOTE — PROGRESS NOTES
Infusion Nursing Note:  Jayson Banegas presents today for Etoposide and cisplatin.    Patient seen by provider today: No   present during visit today: Not Applicable.    Note: Pt presents today for D3C3 etoposide and cisplatin.  Tolerated infusion without incident, port left accessed for infusion tomorrow.      Intravenous Access:  Implanted Port.    Treatment Conditions:  Lab Results   Component Value Date    HGB 12.8 (L) 12/15/2023    WBC 3.4 (L) 12/15/2023    ANEUTAUTO 1.4 (L) 12/15/2023     12/15/2023        Lab Results   Component Value Date     (L) 12/15/2023    POTASSIUM 4.1 12/15/2023    MAG 2.1 12/15/2023    CR 0.72 12/15/2023    ALEX 9.4 12/15/2023    BILITOTAL 0.2 12/15/2023    ALBUMIN 4.2 12/15/2023    ALT 26 12/15/2023    AST 24 12/15/2023       Lab results for this cycle reviewed before previous infusions.      Post Infusion Assessment:  Patient tolerated infusion without incident.  Blood return noted pre and post infusion.  Site patent and intact, free from redness, edema or discomfort.  No evidence of extravasations.  Access discontinued per protocol.       Discharge Plan:   Message sent to MA regarding refill of lorazepam.  Discharge instructions reviewed with: Patient and Family.  Patient and/or family verbalized understanding of discharge instructions and all questions answered.  AVS to patient via [employer].  Patient will return 12/21/23 for next appointment.   Patient discharged in stable condition accompanied by: father.  Departure Mode: Ambulatory.      Jennifer Ann RN

## 2023-12-21 ENCOUNTER — INFUSION THERAPY VISIT (OUTPATIENT)
Dept: INFUSION THERAPY | Facility: CLINIC | Age: 28
End: 2023-12-21
Attending: INTERNAL MEDICINE
Payer: COMMERCIAL

## 2023-12-21 VITALS
OXYGEN SATURATION: 97 % | TEMPERATURE: 97.5 F | DIASTOLIC BLOOD PRESSURE: 79 MMHG | HEART RATE: 73 BPM | RESPIRATION RATE: 18 BRPM | SYSTOLIC BLOOD PRESSURE: 116 MMHG

## 2023-12-21 DIAGNOSIS — C80.1 EMBRYONAL CARCINOMA (H): Primary | ICD-10-CM

## 2023-12-21 PROCEDURE — 258N000003 HC RX IP 258 OP 636: Performed by: INTERNAL MEDICINE

## 2023-12-21 PROCEDURE — 96375 TX/PRO/DX INJ NEW DRUG ADDON: CPT

## 2023-12-21 PROCEDURE — 96413 CHEMO IV INFUSION 1 HR: CPT

## 2023-12-21 PROCEDURE — 96367 TX/PROPH/DG ADDL SEQ IV INF: CPT

## 2023-12-21 PROCEDURE — 96417 CHEMO IV INFUS EACH ADDL SEQ: CPT

## 2023-12-21 PROCEDURE — 250N000011 HC RX IP 250 OP 636: Mod: JZ | Performed by: INTERNAL MEDICINE

## 2023-12-21 RX ORDER — HEPARIN SODIUM,PORCINE 10 UNIT/ML
5-20 VIAL (ML) INTRAVENOUS DAILY PRN
Status: DISCONTINUED | OUTPATIENT
Start: 2023-12-21 | End: 2023-12-21 | Stop reason: HOSPADM

## 2023-12-21 RX ORDER — SODIUM CHLORIDE 9 MG/ML
1000 INJECTION, SOLUTION INTRAVENOUS ONCE
Status: COMPLETED | OUTPATIENT
Start: 2023-12-21 | End: 2023-12-21

## 2023-12-21 RX ORDER — PALONOSETRON 0.05 MG/ML
0.25 INJECTION, SOLUTION INTRAVENOUS ONCE
Status: COMPLETED | OUTPATIENT
Start: 2023-12-21 | End: 2023-12-21

## 2023-12-21 RX ADMIN — ETOPOSIDE 210 MG: 20 INJECTION INTRAVENOUS at 11:33

## 2023-12-21 RX ADMIN — CISPLATIN 40 MG: 1 INJECTION, SOLUTION INTRAVENOUS at 13:00

## 2023-12-21 RX ADMIN — HEPARIN, PORCINE (PF) 10 UNIT/ML INTRAVENOUS SYRINGE 5 ML: at 14:09

## 2023-12-21 RX ADMIN — PALONOSETRON HYDROCHLORIDE 0.25 MG: 0.25 INJECTION INTRAVENOUS at 10:59

## 2023-12-21 RX ADMIN — FAMOTIDINE 20 MG: 10 INJECTION, SOLUTION INTRAVENOUS at 11:04

## 2023-12-21 RX ADMIN — FOSAPREPITANT: 150 INJECTION, POWDER, LYOPHILIZED, FOR SOLUTION INTRAVENOUS at 11:07

## 2023-12-21 RX ADMIN — SODIUM CHLORIDE 1000 ML: 9 INJECTION, SOLUTION INTRAVENOUS at 10:58

## 2023-12-21 ASSESSMENT — PAIN SCALES - GENERAL: PAINLEVEL: MODERATE PAIN (5)

## 2023-12-21 NOTE — PROGRESS NOTES
Infusion Nursing Note:  Jayson Banegas presents today for C3D4 Etoposide, Cisplatin.    Patient seen by provider today: No   present during visit today: Not Applicable.    Note: pt reports severe nausea this morning. Has not taken any antiemetics at home today, but was able to take all morning meds. Pt took Ativan and Compazine from home supply. Will give Pepcid here with premeds.       Intravenous Access:  Implanted Port.    Treatment Conditions:  Not Applicable.      Post Infusion Assessment:  Patient tolerated infusion without incident.  Blood return noted pre and post infusion.  Site patent and intact, free from redness, edema or discomfort.  No evidence of extravasations.  Port remains accessed for treatment tomorrow per pt request.       Discharge Plan:   Discharge instructions reviewed with: Patient.  Patient and/or family verbalized understanding of discharge instructions and all questions answered.  Patient discharged in stable condition accompanied by: mom  Departure Mode: Ambulatory.      Lurdes Nelson RN

## 2023-12-22 ENCOUNTER — INFUSION THERAPY VISIT (OUTPATIENT)
Dept: INFUSION THERAPY | Facility: CLINIC | Age: 28
End: 2023-12-22
Attending: INTERNAL MEDICINE
Payer: COMMERCIAL

## 2023-12-22 VITALS
TEMPERATURE: 97.6 F | OXYGEN SATURATION: 96 % | SYSTOLIC BLOOD PRESSURE: 119 MMHG | DIASTOLIC BLOOD PRESSURE: 79 MMHG | HEART RATE: 82 BPM | RESPIRATION RATE: 16 BRPM

## 2023-12-22 DIAGNOSIS — C80.1 EMBRYONAL CARCINOMA (H): Primary | ICD-10-CM

## 2023-12-22 PROCEDURE — 96361 HYDRATE IV INFUSION ADD-ON: CPT

## 2023-12-22 PROCEDURE — 96417 CHEMO IV INFUS EACH ADDL SEQ: CPT

## 2023-12-22 PROCEDURE — 96413 CHEMO IV INFUSION 1 HR: CPT

## 2023-12-22 PROCEDURE — 258N000003 HC RX IP 258 OP 636: Performed by: INTERNAL MEDICINE

## 2023-12-22 PROCEDURE — 96367 TX/PROPH/DG ADDL SEQ IV INF: CPT

## 2023-12-22 PROCEDURE — 250N000011 HC RX IP 250 OP 636: Performed by: INTERNAL MEDICINE

## 2023-12-22 RX ORDER — SODIUM CHLORIDE 9 MG/ML
1000 INJECTION, SOLUTION INTRAVENOUS ONCE
Status: COMPLETED | OUTPATIENT
Start: 2023-12-22 | End: 2023-12-22

## 2023-12-22 RX ORDER — HEPARIN SODIUM (PORCINE) LOCK FLUSH IV SOLN 100 UNIT/ML 100 UNIT/ML
5 SOLUTION INTRAVENOUS
Status: DISCONTINUED | OUTPATIENT
Start: 2023-12-22 | End: 2023-12-22 | Stop reason: HOSPADM

## 2023-12-22 RX ADMIN — CISPLATIN 40 MG: 1 INJECTION, SOLUTION INTRAVENOUS at 12:32

## 2023-12-22 RX ADMIN — ETOPOSIDE 210 MG: 20 INJECTION INTRAVENOUS at 10:46

## 2023-12-22 RX ADMIN — Medication 5 ML: at 13:35

## 2023-12-22 RX ADMIN — SODIUM CHLORIDE 1000 ML: 9 INJECTION, SOLUTION INTRAVENOUS at 10:11

## 2023-12-22 RX ADMIN — DEXAMETHASONE SODIUM PHOSPHATE 12 MG: 10 INJECTION, SOLUTION INTRAMUSCULAR; INTRAVENOUS at 10:26

## 2023-12-22 NOTE — PROGRESS NOTES
Infusion Nursing Note:  Jayson Banegas presents today for C3D5 Etoposide+Cisplatin+ IV fluids.    Patient seen by provider today: No   present during visit today: Not Applicable.    Note: Pt reports no new health changes or concerns. Reports his nausea is better controlled today, and reports appetite to be okay.      Intravenous Access:  Implanted Port.    Treatment Conditions:  Not Applicable.      Post Infusion Assessment:  Patient tolerated infusion without incident.  Blood return noted pre and post infusion.  Site patent and intact, free from redness, edema or discomfort.  No evidence of extravasations.  Access discontinued per protocol.       Discharge Plan:   Patient declined prescription refills.  Discharge instructions reviewed with: Patient.  Patient and/or family verbalized understanding of discharge instructions and all questions answered.  AVS to patient via [employer].  Patient will return 11/26/23 for next appointment.   Patient discharged in stable condition accompanied by: self.  Departure Mode: Ambulatory.      Teri Camejo RN

## 2023-12-26 ENCOUNTER — INFUSION THERAPY VISIT (OUTPATIENT)
Dept: INFUSION THERAPY | Facility: CLINIC | Age: 28
End: 2023-12-26
Attending: INTERNAL MEDICINE
Payer: COMMERCIAL

## 2023-12-26 VITALS
DIASTOLIC BLOOD PRESSURE: 70 MMHG | TEMPERATURE: 98 F | RESPIRATION RATE: 16 BRPM | SYSTOLIC BLOOD PRESSURE: 120 MMHG | OXYGEN SATURATION: 95 % | HEART RATE: 69 BPM

## 2023-12-26 DIAGNOSIS — C80.1 EMBRYONAL CARCINOMA (H): Primary | ICD-10-CM

## 2023-12-26 LAB
ANION GAP SERPL CALCULATED.3IONS-SCNC: 7 MMOL/L (ref 7–15)
BASOPHILS # BLD AUTO: 0 10E3/UL (ref 0–0.2)
BASOPHILS NFR BLD AUTO: 1 %
BUN SERPL-MCNC: 12.2 MG/DL (ref 6–20)
CALCIUM SERPL-MCNC: 9 MG/DL (ref 8.6–10)
CHLORIDE SERPL-SCNC: 103 MMOL/L (ref 98–107)
CREAT SERPL-MCNC: 0.64 MG/DL (ref 0.67–1.17)
DEPRECATED HCO3 PLAS-SCNC: 27 MMOL/L (ref 22–29)
EGFRCR SERPLBLD CKD-EPI 2021: >90 ML/MIN/1.73M2
EOSINOPHIL # BLD AUTO: 0.1 10E3/UL (ref 0–0.7)
EOSINOPHIL NFR BLD AUTO: 1 %
ERYTHROCYTE [DISTWIDTH] IN BLOOD BY AUTOMATED COUNT: 15.5 % (ref 10–15)
GLUCOSE SERPL-MCNC: 89 MG/DL (ref 70–99)
HCT VFR BLD AUTO: 35.2 % (ref 40–53)
HGB BLD-MCNC: 12 G/DL (ref 13.3–17.7)
IMM GRANULOCYTES # BLD: 0 10E3/UL
IMM GRANULOCYTES NFR BLD: 1 %
LYMPHOCYTES # BLD AUTO: 1.2 10E3/UL (ref 0.8–5.3)
LYMPHOCYTES NFR BLD AUTO: 21 %
MCH RBC QN AUTO: 29.3 PG (ref 26.5–33)
MCHC RBC AUTO-ENTMCNC: 34.1 G/DL (ref 31.5–36.5)
MCV RBC AUTO: 86 FL (ref 78–100)
MONOCYTES # BLD AUTO: 0.1 10E3/UL (ref 0–1.3)
MONOCYTES NFR BLD AUTO: 2 %
NEUTROPHILS # BLD AUTO: 4.1 10E3/UL (ref 1.6–8.3)
NEUTROPHILS NFR BLD AUTO: 74 %
NRBC # BLD AUTO: 0 10E3/UL
NRBC BLD AUTO-RTO: 0 /100
PLATELET # BLD AUTO: 361 10E3/UL (ref 150–450)
POTASSIUM SERPL-SCNC: 4.6 MMOL/L (ref 3.4–5.3)
RBC # BLD AUTO: 4.1 10E6/UL (ref 4.4–5.9)
SODIUM SERPL-SCNC: 137 MMOL/L (ref 135–145)
WBC # BLD AUTO: 5.5 10E3/UL (ref 4–11)

## 2023-12-26 PROCEDURE — 80048 BASIC METABOLIC PNL TOTAL CA: CPT | Performed by: INTERNAL MEDICINE

## 2023-12-26 PROCEDURE — 36593 DECLOT VASCULAR DEVICE: CPT

## 2023-12-26 PROCEDURE — 85025 COMPLETE CBC W/AUTO DIFF WBC: CPT | Performed by: INTERNAL MEDICINE

## 2023-12-26 PROCEDURE — 250N000011 HC RX IP 250 OP 636: Mod: JZ | Performed by: INTERNAL MEDICINE

## 2023-12-26 PROCEDURE — 258N000003 HC RX IP 258 OP 636: Performed by: INTERNAL MEDICINE

## 2023-12-26 PROCEDURE — 36591 DRAW BLOOD OFF VENOUS DEVICE: CPT | Performed by: INTERNAL MEDICINE

## 2023-12-26 PROCEDURE — 96413 CHEMO IV INFUSION 1 HR: CPT

## 2023-12-26 PROCEDURE — 96375 TX/PRO/DX INJ NEW DRUG ADDON: CPT

## 2023-12-26 RX ORDER — HEPARIN SODIUM (PORCINE) LOCK FLUSH IV SOLN 100 UNIT/ML 100 UNIT/ML
5 SOLUTION INTRAVENOUS
Status: CANCELLED | OUTPATIENT
Start: 2023-12-26

## 2023-12-26 RX ORDER — HEPARIN SODIUM (PORCINE) LOCK FLUSH IV SOLN 100 UNIT/ML 100 UNIT/ML
5 SOLUTION INTRAVENOUS
Status: DISCONTINUED | OUTPATIENT
Start: 2023-12-26 | End: 2023-12-26 | Stop reason: HOSPADM

## 2023-12-26 RX ORDER — HEPARIN SODIUM,PORCINE 10 UNIT/ML
5-20 VIAL (ML) INTRAVENOUS DAILY PRN
Status: CANCELLED | OUTPATIENT
Start: 2023-12-26

## 2023-12-26 RX ADMIN — BLEOMYCIN SULFATE 30 UNITS: 30 INJECTION, POWDER, LYOPHILIZED, FOR SOLUTION INTRAMUSCULAR; INTRAPLEURAL; INTRAVENOUS; SUBCUTANEOUS at 13:41

## 2023-12-26 RX ADMIN — ALTEPLASE 2 MG: 2.2 INJECTION, POWDER, LYOPHILIZED, FOR SOLUTION INTRAVENOUS at 12:31

## 2023-12-26 RX ADMIN — Medication 5 ML: at 14:03

## 2023-12-26 RX ADMIN — FAMOTIDINE 20 MG: 10 INJECTION, SOLUTION INTRAVENOUS at 13:26

## 2023-12-26 RX ADMIN — SODIUM CHLORIDE 250 ML: 9 INJECTION, SOLUTION INTRAVENOUS at 13:26

## 2023-12-26 NOTE — PROGRESS NOTES
Infusion Nursing Note:  Jayson Banegas presents today for C3D8 Labs/Bleomycin.    Patient seen by provider today: No   present during visit today: Not Applicable.    Note: Patient slightly nauseated today.  Pepcid given IV and pt also took home supply of Ativan.  Patient declined the need for 1L NS today.      Intravenous Access:  Lab draw site LAC, Needle type BF, Gauge 23.  Labs drawn without difficulty.  Implanted Port.  No blood return noted to port; TPA instilled per protocol.  Excellent blood return noted after 45 min.    Treatment Conditions:  Lab Results   Component Value Date    HGB 12.0 (L) 12/26/2023    WBC 5.5 12/26/2023    ANEUTAUTO 4.1 12/26/2023     12/26/2023        Lab Results   Component Value Date     12/26/2023    POTASSIUM 4.6 12/26/2023    MAG 2.1 12/15/2023    CR 0.64 (L) 12/26/2023    ALEX 9.0 12/26/2023    BILITOTAL 0.2 12/15/2023    ALBUMIN 4.2 12/15/2023    ALT 26 12/15/2023    AST 24 12/15/2023       Results reviewed, labs MET treatment parameters, ok to proceed with treatment.      Post Infusion Assessment:  Patient tolerated infusion without incident.  Blood return noted pre and post infusion.  Site patent and intact, free from redness, edema or discomfort.  No evidence of extravasations.  Access discontinued per protocol.       Discharge Plan:   Discharge instructions reviewed with: Patient.  Patient and/or family verbalized understanding of discharge instructions and all questions answered.  AVS to patient via [employer].  Patient will return 1/2/24 for next appointment.   Patient discharged in stable condition accompanied by: Cousin.  Departure Mode: Ambulatory.      Manuel Westbrook RN

## 2023-12-28 ENCOUNTER — TELEPHONE (OUTPATIENT)
Dept: ONCOLOGY | Facility: CLINIC | Age: 28
End: 2023-12-28
Payer: COMMERCIAL

## 2023-12-28 DIAGNOSIS — U07.1 INFECTION DUE TO 2019 NOVEL CORONAVIRUS: Primary | ICD-10-CM

## 2023-12-28 NOTE — CONFIDENTIAL NOTE
"Deb (mother) calling to report symptoms that pt is having.    She states that pt recently had infusion on 12/26 (C3D8 Labs/Bleomycin) and that he has been reporting restlessness and itchiness ever since. He has not been sleeping well the past two nights because his legs are restless and \"his insides feel itchy\". He is not able to relax and feels that this is the reason that he is not able to sleep.    Separately, pt has notices some upper URI symptoms in the last 24 hours inclusive of congestion and runny nose. He is planning on taking a Covid test for this. Denies fever, shortness of breath, nausea, vomiting, diarrhea, or constipation.    Deb is wondering if anything can be done for his restlessness so that he is able to sleep. She will also update team on the status of the pending Covid test.    Will route to provider for recommendations.  "

## 2023-12-28 NOTE — CONFIDENTIAL NOTE
Writer called Deb with recommendations from Preston Wiseman EDGARD.     Preston Wiseman, GRACE CNP  You; Tierney Santo, RN1 minute ago (1:06 PM)     GK  Start Paxlovid order sent  Advised patient to check temperature frequently  In the event of fever chills sweats cough chest pain swelling fevers he is to go to ER     Deb verbalized understanding and will follow recommendations.

## 2023-12-28 NOTE — CONFIDENTIAL NOTE
Deb calling back with update for team. They had pt take 2 at home Covid tests and both came back positive.    Preferred [facility]acy: [facility]     Will send to provider to update.

## 2023-12-29 NOTE — TELEPHONE ENCOUNTER
FUTURE VISIT INFORMATION      FUTURE VISIT INFORMATION:  Date: 2/29/24  Time: 9am  Location: [facility]  REFERRAL INFORMATION:  Referring provider:  Heather Wick MD   Referring providers clinic:  [facility]   Reason for visit/diagnosis  new per mom-ref, Tinnitus, bilateral [H93.13], Embryonal carcinoma (H) [C80.1], ref-recs in epic, confirmed [facility], pt in chemo-wants a baseline     RECORDS REQUESTED FROM:       Clinic name Comments Records Status Imaging Status   [facility]  12/18/23- [facility] Heather Wick MD  Epic

## 2024-01-02 ENCOUNTER — LAB (OUTPATIENT)
Dept: INFUSION THERAPY | Facility: CLINIC | Age: 29
End: 2024-01-02
Attending: INTERNAL MEDICINE
Payer: COMMERCIAL

## 2024-01-02 VITALS
RESPIRATION RATE: 18 BRPM | DIASTOLIC BLOOD PRESSURE: 70 MMHG | HEART RATE: 69 BPM | OXYGEN SATURATION: 98 % | BODY MASS INDEX: 28.98 KG/M2 | SYSTOLIC BLOOD PRESSURE: 111 MMHG | WEIGHT: 202 LBS | TEMPERATURE: 97.6 F

## 2024-01-02 DIAGNOSIS — C80.1 EMBRYONAL CARCINOMA (H): Primary | ICD-10-CM

## 2024-01-02 DIAGNOSIS — G89.3 CANCER ASSOCIATED PAIN: ICD-10-CM

## 2024-01-02 DIAGNOSIS — C80.1 EMBRYONAL CARCINOMA (H): ICD-10-CM

## 2024-01-02 DIAGNOSIS — T45.1X5A CINV (CHEMOTHERAPY-INDUCED NAUSEA AND VOMITING): ICD-10-CM

## 2024-01-02 DIAGNOSIS — R11.2 CINV (CHEMOTHERAPY-INDUCED NAUSEA AND VOMITING): ICD-10-CM

## 2024-01-02 LAB
ANION GAP SERPL CALCULATED.3IONS-SCNC: 11 MMOL/L (ref 7–15)
BASOPHILS # BLD AUTO: 0 10E3/UL (ref 0–0.2)
BASOPHILS NFR BLD AUTO: 1 %
BUN SERPL-MCNC: 7 MG/DL (ref 6–20)
CALCIUM SERPL-MCNC: 9.7 MG/DL (ref 8.6–10)
CHLORIDE SERPL-SCNC: 102 MMOL/L (ref 98–107)
CREAT SERPL-MCNC: 0.74 MG/DL (ref 0.67–1.17)
DEPRECATED HCO3 PLAS-SCNC: 27 MMOL/L (ref 22–29)
EGFRCR SERPLBLD CKD-EPI 2021: >90 ML/MIN/1.73M2
EOSINOPHIL # BLD AUTO: 0 10E3/UL (ref 0–0.7)
EOSINOPHIL NFR BLD AUTO: 1 %
ERYTHROCYTE [DISTWIDTH] IN BLOOD BY AUTOMATED COUNT: 16.1 % (ref 10–15)
GLUCOSE SERPL-MCNC: 83 MG/DL (ref 70–99)
HCT VFR BLD AUTO: 38.5 % (ref 40–53)
HGB BLD-MCNC: 13.1 G/DL (ref 13.3–17.7)
IMM GRANULOCYTES # BLD: 0 10E3/UL
IMM GRANULOCYTES NFR BLD: 0 %
LYMPHOCYTES # BLD AUTO: 1.2 10E3/UL (ref 0.8–5.3)
LYMPHOCYTES NFR BLD AUTO: 27 %
MCH RBC QN AUTO: 29.1 PG (ref 26.5–33)
MCHC RBC AUTO-ENTMCNC: 34 G/DL (ref 31.5–36.5)
MCV RBC AUTO: 86 FL (ref 78–100)
MONOCYTES # BLD AUTO: 0.4 10E3/UL (ref 0–1.3)
MONOCYTES NFR BLD AUTO: 9 %
NEUTROPHILS # BLD AUTO: 2.7 10E3/UL (ref 1.6–8.3)
NEUTROPHILS NFR BLD AUTO: 62 %
NRBC # BLD AUTO: 0 10E3/UL
NRBC BLD AUTO-RTO: 0 /100
PLATELET # BLD AUTO: 187 10E3/UL (ref 150–450)
POTASSIUM SERPL-SCNC: 4.2 MMOL/L (ref 3.4–5.3)
RBC # BLD AUTO: 4.5 10E6/UL (ref 4.4–5.9)
SODIUM SERPL-SCNC: 140 MMOL/L (ref 135–145)
WBC # BLD AUTO: 4.3 10E3/UL (ref 4–11)

## 2024-01-02 PROCEDURE — 258N000003 HC RX IP 258 OP 636: Performed by: NURSE PRACTITIONER

## 2024-01-02 PROCEDURE — 85025 COMPLETE CBC W/AUTO DIFF WBC: CPT | Performed by: INTERNAL MEDICINE

## 2024-01-02 PROCEDURE — 80048 BASIC METABOLIC PNL TOTAL CA: CPT | Performed by: INTERNAL MEDICINE

## 2024-01-02 PROCEDURE — 96413 CHEMO IV INFUSION 1 HR: CPT

## 2024-01-02 PROCEDURE — 250N000011 HC RX IP 250 OP 636: Mod: JZ | Performed by: INTERNAL MEDICINE

## 2024-01-02 PROCEDURE — 258N000003 HC RX IP 258 OP 636: Performed by: INTERNAL MEDICINE

## 2024-01-02 PROCEDURE — 36591 DRAW BLOOD OFF VENOUS DEVICE: CPT | Performed by: INTERNAL MEDICINE

## 2024-01-02 RX ORDER — ONDANSETRON 8 MG/1
8 TABLET, FILM COATED ORAL EVERY 8 HOURS PRN
Qty: 30 TABLET | Refills: 2 | Status: SHIPPED | OUTPATIENT
Start: 2024-01-02 | End: 2024-01-02

## 2024-01-02 RX ORDER — OLANZAPINE 5 MG/1
5-10 TABLET, ORALLY DISINTEGRATING ORAL 2 TIMES DAILY PRN
Qty: 60 TABLET | Refills: 1 | Status: SHIPPED | OUTPATIENT
Start: 2024-01-02 | End: 2024-02-07

## 2024-01-02 RX ORDER — MORPHINE SULFATE 15 MG/1
15 TABLET, FILM COATED, EXTENDED RELEASE ORAL EVERY 12 HOURS
Qty: 60 TABLET | Refills: 0 | Status: SHIPPED | OUTPATIENT
Start: 2024-01-02 | End: 2024-01-22

## 2024-01-02 RX ORDER — HEPARIN SODIUM (PORCINE) LOCK FLUSH IV SOLN 100 UNIT/ML 100 UNIT/ML
5 SOLUTION INTRAVENOUS
Status: DISCONTINUED | OUTPATIENT
Start: 2024-01-02 | End: 2024-01-02 | Stop reason: HOSPADM

## 2024-01-02 RX ORDER — ONDANSETRON 8 MG/1
8 TABLET, FILM COATED ORAL EVERY 8 HOURS PRN
Qty: 60 TABLET | Refills: 2 | Status: SHIPPED | OUTPATIENT
Start: 2024-01-02 | End: 2024-05-01

## 2024-01-02 RX ORDER — LORAZEPAM 0.5 MG/1
.5-1 TABLET ORAL EVERY 6 HOURS PRN
Qty: 60 TABLET | Refills: 0 | Status: SHIPPED | OUTPATIENT
Start: 2024-01-02 | End: 2024-01-22

## 2024-01-02 RX ORDER — MORPHINE SULFATE 15 MG/1
15 TABLET, FILM COATED, EXTENDED RELEASE ORAL EVERY 12 HOURS
Qty: 60 TABLET | Refills: 0 | Status: SHIPPED | OUTPATIENT
Start: 2024-01-02 | End: 2024-01-02

## 2024-01-02 RX ADMIN — SODIUM CHLORIDE 1000 ML: 9 INJECTION, SOLUTION INTRAVENOUS at 13:34

## 2024-01-02 RX ADMIN — Medication 5 ML: at 15:03

## 2024-01-02 RX ADMIN — BLEOMYCIN SULFATE 30 UNITS: 30 INJECTION, POWDER, LYOPHILIZED, FOR SOLUTION INTRAMUSCULAR; INTRAPLEURAL; INTRAVENOUS; SUBCUTANEOUS at 14:31

## 2024-01-02 NOTE — PROGRESS NOTES
Infusion Nursing Note:  Jayson Banegas presents today for port labs.    Patient seen by provider today: No   present during visit today: Not Applicable.    Note: N/A.    Intravenous Access:  Labs drawn without difficulty.  Implanted Port.    Treatment Conditions:  Not Applicable. Labs pending at time of discharge.      Post Infusion Assessment:  Site patent and intact, free from redness, edema or discomfort.  No evidence of extravasations.  Port access left in place for infusion today.      Discharge Plan:   Patient discharged in stable condition accompanied by: mother.  Departure Mode: Ambulatory.    Daniella Angel RN

## 2024-01-02 NOTE — TELEPHONE ENCOUNTER
Received MyChart message from [facility], Tierney Santo requesting refill of olanzapine.     Last refill: 12/7/2023  Last office visit: 12/7/2023  Scheduled for follow up 1/16/2024    Will route request to MD for review.     Reviewed [place]  Report.

## 2024-01-02 NOTE — PROGRESS NOTES
"Infusion Nursing Note:  Jayson Banegas presents today for C3D15 Bleomycin.    Patient seen by provider today: Yes: Preston Wiseman NP (notified to see in infusion today).   present during visit today: Not Applicable.    Note: Patient states he felt \"ok\" this am until the drive here. Patient states he is nauseated, vomited once this am prior to the drive here, feels like he is dehydrated, and ran out of his pain medication (which he sent in a mychart to have them refilled). Patient is not scheduled with a provider today. Writer contacted Preston Wiseman NP to see patient today due to symptoms, which Preston agreed to see him. Patient states he's feeling better with the IVF running, and refused any nausea meds today stating he thinks he will be fine after the fluids. Patient verbalized understanding of the current plan.      Intravenous Access:  Implanted Port.    Treatment Conditions:  Lab Results   Component Value Date    HGB 13.1 (L) 01/02/2024    WBC 4.3 01/02/2024    ANEUTAUTO 2.7 01/02/2024     01/02/2024        Lab Results   Component Value Date     01/02/2024    POTASSIUM 4.2 01/02/2024    MAG 2.1 12/15/2023    CR 0.74 01/02/2024    ALEX 9.7 01/02/2024    BILITOTAL 0.2 12/15/2023    ALBUMIN 4.2 12/15/2023    ALT 26 12/15/2023    AST 24 12/15/2023       Results reviewed, labs MET treatment parameters, ok to proceed with treatment.      Post Infusion Assessment:  Patient tolerated infusion without incident.  Blood return noted pre and post infusion.  Site patent and intact, free from redness, edema or discomfort.  No evidence of extravasations.  Access discontinued per protocol.       Discharge Plan:   Prescription refills given for MS Contin, ativan, and zofran.  Discharge instructions reviewed with: Patient and Family.  Patient and/or family verbalized understanding of discharge instructions and all questions answered.  AVS to patient via [employer].  Patient will return 1/5/24(VV with Preston) " for next appointment.   Patient discharged in stable condition accompanied by: mother.  Departure Mode: Ambulatory.      Ana Alexandra RN

## 2024-01-04 ENCOUNTER — MYC REFILL (OUTPATIENT)
Dept: ONCOLOGY | Facility: CLINIC | Age: 29
End: 2024-01-04
Payer: COMMERCIAL

## 2024-01-04 DIAGNOSIS — C80.1 EMBRYONAL CARCINOMA (H): ICD-10-CM

## 2024-01-04 DIAGNOSIS — G89.3 CANCER ASSOCIATED PAIN: ICD-10-CM

## 2024-01-04 RX ORDER — HYDROMORPHONE HYDROCHLORIDE 2 MG/1
2-4 TABLET ORAL EVERY 4 HOURS PRN
Qty: 60 TABLET | Refills: 0 | Status: SHIPPED | OUTPATIENT
Start: 2024-01-04 | End: 2024-01-14

## 2024-01-05 ENCOUNTER — VIRTUAL VISIT (OUTPATIENT)
Dept: ONCOLOGY | Facility: CLINIC | Age: 29
End: 2024-01-05
Attending: NURSE PRACTITIONER
Payer: COMMERCIAL

## 2024-01-05 VITALS — HEIGHT: 69 IN | WEIGHT: 198 LBS | BODY MASS INDEX: 29.33 KG/M2

## 2024-01-05 DIAGNOSIS — C80.1 EMBRYONAL CARCINOMA (H): Primary | ICD-10-CM

## 2024-01-05 PROCEDURE — 99214 OFFICE O/P EST MOD 30 MIN: CPT | Mod: VID | Performed by: NURSE PRACTITIONER

## 2024-01-05 RX ORDER — HEPARIN SODIUM (PORCINE) LOCK FLUSH IV SOLN 100 UNIT/ML 100 UNIT/ML
5 SOLUTION INTRAVENOUS
Status: CANCELLED | OUTPATIENT
Start: 2024-01-22

## 2024-01-05 RX ORDER — ALBUTEROL SULFATE 90 UG/1
1-2 AEROSOL, METERED RESPIRATORY (INHALATION)
Status: CANCELLED
Start: 2024-01-10

## 2024-01-05 RX ORDER — HEPARIN SODIUM,PORCINE 10 UNIT/ML
5-20 VIAL (ML) INTRAVENOUS DAILY PRN
Status: CANCELLED | OUTPATIENT
Start: 2024-01-15

## 2024-01-05 RX ORDER — ALBUTEROL SULFATE 0.83 MG/ML
2.5 SOLUTION RESPIRATORY (INHALATION)
Status: CANCELLED | OUTPATIENT
Start: 2024-01-11

## 2024-01-05 RX ORDER — METHYLPREDNISOLONE SODIUM SUCCINATE 125 MG/2ML
125 INJECTION, POWDER, LYOPHILIZED, FOR SOLUTION INTRAMUSCULAR; INTRAVENOUS
Status: CANCELLED
Start: 2024-01-22

## 2024-01-05 RX ORDER — LORAZEPAM 2 MG/ML
0.5 INJECTION INTRAMUSCULAR EVERY 4 HOURS PRN
Status: CANCELLED | OUTPATIENT
Start: 2024-01-09

## 2024-01-05 RX ORDER — ALBUTEROL SULFATE 0.83 MG/ML
2.5 SOLUTION RESPIRATORY (INHALATION)
Status: CANCELLED | OUTPATIENT
Start: 2024-01-09

## 2024-01-05 RX ORDER — METHYLPREDNISOLONE SODIUM SUCCINATE 125 MG/2ML
125 INJECTION, POWDER, LYOPHILIZED, FOR SOLUTION INTRAMUSCULAR; INTRAVENOUS
Status: CANCELLED
Start: 2024-01-12

## 2024-01-05 RX ORDER — EPINEPHRINE 1 MG/ML
0.3 INJECTION, SOLUTION INTRAMUSCULAR; SUBCUTANEOUS EVERY 5 MIN PRN
Status: CANCELLED | OUTPATIENT
Start: 2024-01-15

## 2024-01-05 RX ORDER — MEPERIDINE HYDROCHLORIDE 25 MG/ML
25 INJECTION INTRAMUSCULAR; INTRAVENOUS; SUBCUTANEOUS EVERY 30 MIN PRN
Status: CANCELLED | OUTPATIENT
Start: 2024-01-10

## 2024-01-05 RX ORDER — ALBUTEROL SULFATE 90 UG/1
1-2 AEROSOL, METERED RESPIRATORY (INHALATION)
Status: CANCELLED
Start: 2024-01-15

## 2024-01-05 RX ORDER — DIPHENHYDRAMINE HYDROCHLORIDE 50 MG/ML
50 INJECTION INTRAMUSCULAR; INTRAVENOUS
Status: CANCELLED
Start: 2024-01-09

## 2024-01-05 RX ORDER — ALBUTEROL SULFATE 0.83 MG/ML
2.5 SOLUTION RESPIRATORY (INHALATION)
Status: CANCELLED | OUTPATIENT
Start: 2024-01-15

## 2024-01-05 RX ORDER — ALBUTEROL SULFATE 90 UG/1
1-2 AEROSOL, METERED RESPIRATORY (INHALATION)
Status: CANCELLED
Start: 2024-01-11

## 2024-01-05 RX ORDER — HEPARIN SODIUM (PORCINE) LOCK FLUSH IV SOLN 100 UNIT/ML 100 UNIT/ML
5 SOLUTION INTRAVENOUS
Status: CANCELLED | OUTPATIENT
Start: 2024-01-10

## 2024-01-05 RX ORDER — DIPHENHYDRAMINE HYDROCHLORIDE 50 MG/ML
50 INJECTION INTRAMUSCULAR; INTRAVENOUS
Status: CANCELLED
Start: 2024-01-10

## 2024-01-05 RX ORDER — LORAZEPAM 2 MG/ML
0.5 INJECTION INTRAMUSCULAR EVERY 4 HOURS PRN
Status: CANCELLED | OUTPATIENT
Start: 2024-01-22

## 2024-01-05 RX ORDER — ALBUTEROL SULFATE 0.83 MG/ML
2.5 SOLUTION RESPIRATORY (INHALATION)
Status: CANCELLED | OUTPATIENT
Start: 2024-01-22

## 2024-01-05 RX ORDER — HEPARIN SODIUM,PORCINE 10 UNIT/ML
5-20 VIAL (ML) INTRAVENOUS DAILY PRN
Status: CANCELLED | OUTPATIENT
Start: 2024-01-08

## 2024-01-05 RX ORDER — HEPARIN SODIUM,PORCINE 10 UNIT/ML
5-20 VIAL (ML) INTRAVENOUS DAILY PRN
Status: CANCELLED | OUTPATIENT
Start: 2024-01-10

## 2024-01-05 RX ORDER — METHYLPREDNISOLONE SODIUM SUCCINATE 125 MG/2ML
125 INJECTION, POWDER, LYOPHILIZED, FOR SOLUTION INTRAMUSCULAR; INTRAVENOUS
Status: CANCELLED
Start: 2024-01-10

## 2024-01-05 RX ORDER — MEPERIDINE HYDROCHLORIDE 25 MG/ML
25 INJECTION INTRAMUSCULAR; INTRAVENOUS; SUBCUTANEOUS EVERY 30 MIN PRN
Status: CANCELLED | OUTPATIENT
Start: 2024-01-09

## 2024-01-05 RX ORDER — HEPARIN SODIUM (PORCINE) LOCK FLUSH IV SOLN 100 UNIT/ML 100 UNIT/ML
5 SOLUTION INTRAVENOUS
Status: CANCELLED | OUTPATIENT
Start: 2024-01-12

## 2024-01-05 RX ORDER — MEPERIDINE HYDROCHLORIDE 25 MG/ML
25 INJECTION INTRAMUSCULAR; INTRAVENOUS; SUBCUTANEOUS EVERY 30 MIN PRN
Status: CANCELLED | OUTPATIENT
Start: 2024-01-15

## 2024-01-05 RX ORDER — MEPERIDINE HYDROCHLORIDE 25 MG/ML
25 INJECTION INTRAMUSCULAR; INTRAVENOUS; SUBCUTANEOUS EVERY 30 MIN PRN
Status: CANCELLED | OUTPATIENT
Start: 2024-01-08

## 2024-01-05 RX ORDER — ALBUTEROL SULFATE 90 UG/1
1-2 AEROSOL, METERED RESPIRATORY (INHALATION)
Status: CANCELLED
Start: 2024-01-22

## 2024-01-05 RX ORDER — DIPHENHYDRAMINE HYDROCHLORIDE 50 MG/ML
50 INJECTION INTRAMUSCULAR; INTRAVENOUS
Status: CANCELLED
Start: 2024-01-15

## 2024-01-05 RX ORDER — ALBUTEROL SULFATE 90 UG/1
1-2 AEROSOL, METERED RESPIRATORY (INHALATION)
Status: CANCELLED
Start: 2024-01-09

## 2024-01-05 RX ORDER — MEPERIDINE HYDROCHLORIDE 25 MG/ML
25 INJECTION INTRAMUSCULAR; INTRAVENOUS; SUBCUTANEOUS EVERY 30 MIN PRN
Status: CANCELLED | OUTPATIENT
Start: 2024-01-22

## 2024-01-05 RX ORDER — HEPARIN SODIUM (PORCINE) LOCK FLUSH IV SOLN 100 UNIT/ML 100 UNIT/ML
5 SOLUTION INTRAVENOUS
Status: CANCELLED | OUTPATIENT
Start: 2024-01-08

## 2024-01-05 RX ORDER — MEPERIDINE HYDROCHLORIDE 25 MG/ML
25 INJECTION INTRAMUSCULAR; INTRAVENOUS; SUBCUTANEOUS EVERY 30 MIN PRN
Status: CANCELLED | OUTPATIENT
Start: 2024-01-11

## 2024-01-05 RX ORDER — METHYLPREDNISOLONE SODIUM SUCCINATE 125 MG/2ML
125 INJECTION, POWDER, LYOPHILIZED, FOR SOLUTION INTRAMUSCULAR; INTRAVENOUS
Status: CANCELLED
Start: 2024-01-15

## 2024-01-05 RX ORDER — ALBUTEROL SULFATE 0.83 MG/ML
2.5 SOLUTION RESPIRATORY (INHALATION)
Status: CANCELLED | OUTPATIENT
Start: 2024-01-10

## 2024-01-05 RX ORDER — HEPARIN SODIUM (PORCINE) LOCK FLUSH IV SOLN 100 UNIT/ML 100 UNIT/ML
5 SOLUTION INTRAVENOUS
Status: CANCELLED | OUTPATIENT
Start: 2024-01-09

## 2024-01-05 RX ORDER — ALBUTEROL SULFATE 0.83 MG/ML
2.5 SOLUTION RESPIRATORY (INHALATION)
Status: CANCELLED | OUTPATIENT
Start: 2024-01-12

## 2024-01-05 RX ORDER — SODIUM CHLORIDE 9 MG/ML
1000 INJECTION, SOLUTION INTRAVENOUS ONCE
Status: CANCELLED
Start: 2024-01-11

## 2024-01-05 RX ORDER — MEPERIDINE HYDROCHLORIDE 25 MG/ML
25 INJECTION INTRAMUSCULAR; INTRAVENOUS; SUBCUTANEOUS EVERY 30 MIN PRN
Status: CANCELLED | OUTPATIENT
Start: 2024-01-12

## 2024-01-05 RX ORDER — DIPHENHYDRAMINE HYDROCHLORIDE 50 MG/ML
50 INJECTION INTRAMUSCULAR; INTRAVENOUS
Status: CANCELLED
Start: 2024-01-12

## 2024-01-05 RX ORDER — LORAZEPAM 2 MG/ML
0.5 INJECTION INTRAMUSCULAR EVERY 4 HOURS PRN
Status: CANCELLED | OUTPATIENT
Start: 2024-01-10

## 2024-01-05 RX ORDER — METHYLPREDNISOLONE SODIUM SUCCINATE 125 MG/2ML
125 INJECTION, POWDER, LYOPHILIZED, FOR SOLUTION INTRAMUSCULAR; INTRAVENOUS
Status: CANCELLED
Start: 2024-01-09

## 2024-01-05 RX ORDER — METHYLPREDNISOLONE SODIUM SUCCINATE 125 MG/2ML
125 INJECTION, POWDER, LYOPHILIZED, FOR SOLUTION INTRAMUSCULAR; INTRAVENOUS
Status: CANCELLED
Start: 2024-01-08

## 2024-01-05 RX ORDER — LORAZEPAM 2 MG/ML
0.5 INJECTION INTRAMUSCULAR EVERY 4 HOURS PRN
Status: CANCELLED | OUTPATIENT
Start: 2024-01-11

## 2024-01-05 RX ORDER — EPINEPHRINE 1 MG/ML
0.3 INJECTION, SOLUTION INTRAMUSCULAR; SUBCUTANEOUS EVERY 5 MIN PRN
Status: CANCELLED | OUTPATIENT
Start: 2024-01-11

## 2024-01-05 RX ORDER — DIPHENHYDRAMINE HYDROCHLORIDE 50 MG/ML
50 INJECTION INTRAMUSCULAR; INTRAVENOUS
Status: CANCELLED
Start: 2024-01-08

## 2024-01-05 RX ORDER — HEPARIN SODIUM,PORCINE 10 UNIT/ML
5-20 VIAL (ML) INTRAVENOUS DAILY PRN
Status: CANCELLED | OUTPATIENT
Start: 2024-01-22

## 2024-01-05 RX ORDER — LORAZEPAM 2 MG/ML
0.5 INJECTION INTRAMUSCULAR EVERY 4 HOURS PRN
Status: CANCELLED | OUTPATIENT
Start: 2024-01-08

## 2024-01-05 RX ORDER — SODIUM CHLORIDE 9 MG/ML
1000 INJECTION, SOLUTION INTRAVENOUS ONCE
Status: CANCELLED
Start: 2024-01-12

## 2024-01-05 RX ORDER — SODIUM CHLORIDE 9 MG/ML
1000 INJECTION, SOLUTION INTRAVENOUS ONCE
Status: CANCELLED
Start: 2024-01-08

## 2024-01-05 RX ORDER — HEPARIN SODIUM,PORCINE 10 UNIT/ML
5-20 VIAL (ML) INTRAVENOUS DAILY PRN
Status: CANCELLED | OUTPATIENT
Start: 2024-01-12

## 2024-01-05 RX ORDER — ALBUTEROL SULFATE 90 UG/1
1-2 AEROSOL, METERED RESPIRATORY (INHALATION)
Status: CANCELLED
Start: 2024-01-12

## 2024-01-05 RX ORDER — PALONOSETRON 0.05 MG/ML
0.25 INJECTION, SOLUTION INTRAVENOUS ONCE
Status: CANCELLED | OUTPATIENT
Start: 2024-01-08

## 2024-01-05 RX ORDER — LORAZEPAM 2 MG/ML
0.5 INJECTION INTRAMUSCULAR EVERY 4 HOURS PRN
Status: CANCELLED | OUTPATIENT
Start: 2024-01-15

## 2024-01-05 RX ORDER — HEPARIN SODIUM (PORCINE) LOCK FLUSH IV SOLN 100 UNIT/ML 100 UNIT/ML
5 SOLUTION INTRAVENOUS
Status: CANCELLED | OUTPATIENT
Start: 2024-01-15

## 2024-01-05 RX ORDER — EPINEPHRINE 1 MG/ML
0.3 INJECTION, SOLUTION INTRAMUSCULAR; SUBCUTANEOUS EVERY 5 MIN PRN
Status: CANCELLED | OUTPATIENT
Start: 2024-01-22

## 2024-01-05 RX ORDER — SODIUM CHLORIDE 9 MG/ML
1000 INJECTION, SOLUTION INTRAVENOUS ONCE
Status: CANCELLED
Start: 2024-01-09

## 2024-01-05 RX ORDER — ALBUTEROL SULFATE 0.83 MG/ML
2.5 SOLUTION RESPIRATORY (INHALATION)
Status: CANCELLED | OUTPATIENT
Start: 2024-01-08

## 2024-01-05 RX ORDER — METHYLPREDNISOLONE SODIUM SUCCINATE 125 MG/2ML
125 INJECTION, POWDER, LYOPHILIZED, FOR SOLUTION INTRAMUSCULAR; INTRAVENOUS
Status: CANCELLED
Start: 2024-01-11

## 2024-01-05 RX ORDER — ALBUTEROL SULFATE 90 UG/1
1-2 AEROSOL, METERED RESPIRATORY (INHALATION)
Status: CANCELLED
Start: 2024-01-08

## 2024-01-05 RX ORDER — HEPARIN SODIUM,PORCINE 10 UNIT/ML
5-20 VIAL (ML) INTRAVENOUS DAILY PRN
Status: CANCELLED | OUTPATIENT
Start: 2024-01-09

## 2024-01-05 RX ORDER — EPINEPHRINE 1 MG/ML
0.3 INJECTION, SOLUTION INTRAMUSCULAR; SUBCUTANEOUS EVERY 5 MIN PRN
Status: CANCELLED | OUTPATIENT
Start: 2024-01-10

## 2024-01-05 RX ORDER — SODIUM CHLORIDE 9 MG/ML
1000 INJECTION, SOLUTION INTRAVENOUS ONCE
Status: CANCELLED
Start: 2024-01-10

## 2024-01-05 RX ORDER — EPINEPHRINE 1 MG/ML
0.3 INJECTION, SOLUTION INTRAMUSCULAR; SUBCUTANEOUS EVERY 5 MIN PRN
Status: CANCELLED | OUTPATIENT
Start: 2024-01-09

## 2024-01-05 RX ORDER — DIPHENHYDRAMINE HYDROCHLORIDE 50 MG/ML
50 INJECTION INTRAMUSCULAR; INTRAVENOUS
Status: CANCELLED
Start: 2024-01-22

## 2024-01-05 RX ORDER — PALONOSETRON 0.05 MG/ML
0.25 INJECTION, SOLUTION INTRAVENOUS ONCE
Status: CANCELLED | OUTPATIENT
Start: 2024-01-11

## 2024-01-05 RX ORDER — LORAZEPAM 2 MG/ML
0.5 INJECTION INTRAMUSCULAR EVERY 4 HOURS PRN
Status: CANCELLED | OUTPATIENT
Start: 2024-01-12

## 2024-01-05 RX ORDER — EPINEPHRINE 1 MG/ML
0.3 INJECTION, SOLUTION INTRAMUSCULAR; SUBCUTANEOUS EVERY 5 MIN PRN
Status: CANCELLED | OUTPATIENT
Start: 2024-01-12

## 2024-01-05 RX ORDER — DIPHENHYDRAMINE HYDROCHLORIDE 50 MG/ML
50 INJECTION INTRAMUSCULAR; INTRAVENOUS
Status: CANCELLED
Start: 2024-01-11

## 2024-01-05 RX ORDER — HEPARIN SODIUM (PORCINE) LOCK FLUSH IV SOLN 100 UNIT/ML 100 UNIT/ML
5 SOLUTION INTRAVENOUS
Status: CANCELLED | OUTPATIENT
Start: 2024-01-11

## 2024-01-05 RX ORDER — EPINEPHRINE 1 MG/ML
0.3 INJECTION, SOLUTION INTRAMUSCULAR; SUBCUTANEOUS EVERY 5 MIN PRN
Status: CANCELLED | OUTPATIENT
Start: 2024-01-08

## 2024-01-05 RX ORDER — HEPARIN SODIUM,PORCINE 10 UNIT/ML
5-20 VIAL (ML) INTRAVENOUS DAILY PRN
Status: CANCELLED | OUTPATIENT
Start: 2024-01-11

## 2024-01-05 ASSESSMENT — PAIN SCALES - GENERAL: PAINLEVEL: NO PAIN (0)

## 2024-01-05 NOTE — LETTER
"    1/5/2024         RE: Jayson Banegas  [address]  [address]  [place] [address]        Dear Colleague,    Thank you for referring your patient, Jayson Banegas, to the [facility]. Please see a copy of my visit note below.    Virtual Visit Details    Type of service:  Video Visit     Originating Location (pt. Location): Home    Distant Location (provider location):  On-site  Platform used for Video Visit: [facility]      Oncology/Hematology Visit Note  Jan 5, 2024    Reason for Visit: follow up of    1 Embryonal carcinoma, clinical stage IIIB, [id number], S2 (pre-orchiectomy), intermediate risk  2. Probable metastases to bilateral lungs  3. Diffuse abdominal, pelvic, and retroperitoneal lymphadenopathy with associated pain    Follows with Dr. Wick      Interval History:  Patient reports he continues to feel weak next week with chemo.  He is able to ambulate.  Patient denies fever chills sweats cough reports that some mild dyspnea which feels better than before.  Patient denies chest pain  Denies edema  Patient reports his pain is well controlled with Dilaudid p.o.      Review of Systems:  14 point ROS of systems including Constitutional, Eyes, Respiratory, Cardiovascular, Gastroenterology, Genitourinary, Integumentary, Muscularskeletal, Psychiatric were all negative except for pertinent positives noted in my HPI.    Physical Examination:  Video visit-patient answers all questions appropriately no audible wheezing or cough appears to be sitting comfortably    Laboratory Data:  Labs will be done on Monday when he comes for chemo    Assessment and Plan:    Embryonal carcinoma, clinical stage IIIB, [id number], S2 (pre-orchiectomy), intermediate risk  Probable metastases to bilateral lungs  Currently on BEP chemo regimen  01/08/24-patient will start cycle 4  Plan for CT scan in February and follow-up appointment with Dr. Wick  Per Dr. Wick's  note --\"if persistent disease " "with any lymph nodes > 2 cm, will have Delroy revisit Dr. Gilmore for RPLND/surgical resection.  Otherwise, surveillance post-chemo would be every 2 months year 1, every 4 months year 2, every 6 months years 3-5, annually after year 5 and check serum HCG and AFP, chest x-ray\"         Diffuse abdominal, pelvic, and retroperitoneal lymphadenopathy with associated pain  Well-controlled with MS Contin and Dilaudid    Dyspnea  He is scheduled for pulmonary function test but he was call if positive so he will reschedule    History of COVID positive on 12/30/2023  Completed Paxlovid  Asymptomatic      Please call clinic with any changes health condition or questions      GRACE Sheppard CNP  [facility]     Chart documentation with Dragon Voice recognition Software. Although reviewed after completion, some words and grammatical errors may remain.          Again, thank you for allowing me to participate in the care of your patient.        Sincerely,        GRACE Sheppard CNP  "

## 2024-01-05 NOTE — NURSING NOTE
Is the patient currently in the state of [place]? YES    Visit mode:VIDEO    If the visit is dropped, the patient can be reconnected by: VIDEO VISIT: Send to e-mail at: [email or web address]    Will anyone else be joining the visit? NO  (If patient encounters technical issues they should call [phone])    How would you like to obtain your AVS? MyChart    Are changes needed to the allergy or medication list? No    Reason for visit: VITO HERNANDEZ

## 2024-01-05 NOTE — LETTER
"    1/5/2024         RE: Jayson Banegas  [address]  [address]  [place] [address]        Dear Colleague,    Thank you for referring your patient, Jayson Banegas, to the [facility]. Please see a copy of my visit note below.    Virtual Visit Details    Type of service:  Video Visit     Originating Location (pt. Location): Home    Distant Location (provider location):  On-site  Platform used for Video Visit: [facility]      Oncology/Hematology Visit Note  Jan 5, 2024    Reason for Visit: follow up of    1 Embryonal carcinoma, clinical stage IIIB, [id number], S2 (pre-orchiectomy), intermediate risk  2. Probable metastases to bilateral lungs  3. Diffuse abdominal, pelvic, and retroperitoneal lymphadenopathy with associated pain    Follows with Dr. Wick      Interval History:  Patient reports he continues to feel weak next week with chemo.  He is able to ambulate.  Patient denies fever chills sweats cough reports that some mild dyspnea which feels better than before.  Patient denies chest pain  Denies edema  Patient reports his pain is well controlled with Dilaudid p.o.      Review of Systems:  14 point ROS of systems including Constitutional, Eyes, Respiratory, Cardiovascular, Gastroenterology, Genitourinary, Integumentary, Muscularskeletal, Psychiatric were all negative except for pertinent positives noted in my HPI.    Physical Examination:  Video visit-patient answers all questions appropriately no audible wheezing or cough appears to be sitting comfortably    Laboratory Data:  Labs will be done on Monday when he comes for chemo    Assessment and Plan:    Embryonal carcinoma, clinical stage IIIB, [id number], S2 (pre-orchiectomy), intermediate risk  Probable metastases to bilateral lungs  Currently on BEP chemo regimen  01/08/24-patient will start cycle 4  Plan for CT scan in February and follow-up appointment with Dr. Wick  Per Dr. Wick's  note --\"if persistent disease " "with any lymph nodes > 2 cm, will have Delroy revisit Dr. Gilmore for RPLND/surgical resection.  Otherwise, surveillance post-chemo would be every 2 months year 1, every 4 months year 2, every 6 months years 3-5, annually after year 5 and check serum HCG and AFP, chest x-ray\"         Diffuse abdominal, pelvic, and retroperitoneal lymphadenopathy with associated pain  Well-controlled with MS Contin and Dilaudid    Dyspnea  He is scheduled for pulmonary function test but he was call if positive so he will reschedule    History of COVID positive on 12/30/2023  Completed Paxlovid  Asymptomatic      Please call clinic with any changes health condition or questions      GRACE Sheppard CNP  [facility]     Chart documentation with Dragon Voice recognition Software. Although reviewed after completion, some words and grammatical errors may remain.          Again, thank you for allowing me to participate in the care of your patient.        Sincerely,        GRACE Sheppard CNP  "

## 2024-01-05 NOTE — PROGRESS NOTES
"Virtual Visit Details    Type of service:  Video Visit     Originating Location (pt. Location): Home    Distant Location (provider location):  On-site  Platform used for Video Visit: [facility]      Oncology/Hematology Visit Note  Jan 5, 2024    Reason for Visit: follow up of    1 Embryonal carcinoma, clinical stage IIIB, [id number], S2 (pre-orchiectomy), intermediate risk  2. Probable metastases to bilateral lungs  3. Diffuse abdominal, pelvic, and retroperitoneal lymphadenopathy with associated pain    Follows with Dr. Wick      Interval History:  Patient reports he continues to feel weak next week with chemo.  He is able to ambulate.  Patient denies fever chills sweats cough reports that some mild dyspnea which feels better than before.  Patient denies chest pain  Denies edema  Patient reports his pain is well controlled with Dilaudid p.o.      Review of Systems:  14 point ROS of systems including Constitutional, Eyes, Respiratory, Cardiovascular, Gastroenterology, Genitourinary, Integumentary, Muscularskeletal, Psychiatric were all negative except for pertinent positives noted in my HPI.    Physical Examination:  Video visit-patient answers all questions appropriately no audible wheezing or cough appears to be sitting comfortably    Laboratory Data:  Labs will be done on Monday when he comes for chemo    Assessment and Plan:    Embryonal carcinoma, clinical stage IIIB, [id number], S2 (pre-orchiectomy), intermediate risk  Probable metastases to bilateral lungs  Currently on BEP chemo regimen  01/08/24-patient will start cycle 4  Plan for CT scan in February and follow-up appointment with Dr. Wick  Per Dr. Wick's  note --\"if persistent disease with any lymph nodes > 2 cm, will have Delroy revisit Dr. Gilmore for RPLND/surgical resection.  Otherwise, surveillance post-chemo would be every 2 months year 1, every 4 months year 2, every 6 months years 3-5, annually after year 5 and check serum HCG and AFP, chest " "x-ray\"         Diffuse abdominal, pelvic, and retroperitoneal lymphadenopathy with associated pain  Well-controlled with MS Contin and Dilaudid    Dyspnea  He is scheduled for pulmonary function test but he was call if positive so he will reschedule    History of COVID positive on 12/30/2023  Completed Paxlovid  Asymptomatic      Please call clinic with any changes health condition or questions      GRACE Sheppard [facility]     Chart documentation with Dragon Voice recognition Software. Although reviewed after completion, some words and grammatical errors may remain.        "

## 2024-01-08 ENCOUNTER — LAB (OUTPATIENT)
Dept: INFUSION THERAPY | Facility: CLINIC | Age: 29
End: 2024-01-08
Attending: INTERNAL MEDICINE
Payer: COMMERCIAL

## 2024-01-08 VITALS
WEIGHT: 206.2 LBS | RESPIRATION RATE: 16 BRPM | DIASTOLIC BLOOD PRESSURE: 78 MMHG | BODY MASS INDEX: 30.45 KG/M2 | SYSTOLIC BLOOD PRESSURE: 119 MMHG | TEMPERATURE: 97.7 F | OXYGEN SATURATION: 97 % | HEART RATE: 86 BPM

## 2024-01-08 DIAGNOSIS — C80.1 EMBRYONAL CARCINOMA (H): Primary | ICD-10-CM

## 2024-01-08 LAB
AFP SERPL-MCNC: 2.7 NG/ML
ALBUMIN SERPL BCG-MCNC: 4.3 G/DL (ref 3.5–5.2)
ALP SERPL-CCNC: 50 U/L (ref 40–150)
ALT SERPL W P-5'-P-CCNC: 31 U/L (ref 0–70)
ANION GAP SERPL CALCULATED.3IONS-SCNC: 5 MMOL/L (ref 7–15)
AST SERPL W P-5'-P-CCNC: 25 U/L (ref 0–45)
BASOPHILS # BLD AUTO: ABNORMAL 10*3/UL
BASOPHILS # BLD MANUAL: 0 10E3/UL (ref 0–0.2)
BASOPHILS NFR BLD AUTO: ABNORMAL %
BASOPHILS NFR BLD MANUAL: 0 %
BILIRUB SERPL-MCNC: 0.2 MG/DL
BUN SERPL-MCNC: 6 MG/DL (ref 6–20)
CALCIUM SERPL-MCNC: 9.5 MG/DL (ref 8.6–10)
CHLORIDE SERPL-SCNC: 104 MMOL/L (ref 98–107)
CREAT SERPL-MCNC: 0.75 MG/DL (ref 0.67–1.17)
DEPRECATED HCO3 PLAS-SCNC: 29 MMOL/L (ref 22–29)
EGFRCR SERPLBLD CKD-EPI 2021: >90 ML/MIN/1.73M2
EOSINOPHIL # BLD AUTO: ABNORMAL 10*3/UL
EOSINOPHIL # BLD MANUAL: 0.1 10E3/UL (ref 0–0.7)
EOSINOPHIL NFR BLD AUTO: ABNORMAL %
EOSINOPHIL NFR BLD MANUAL: 4 %
ERYTHROCYTE [DISTWIDTH] IN BLOOD BY AUTOMATED COUNT: 16.3 % (ref 10–15)
GLUCOSE SERPL-MCNC: 125 MG/DL (ref 70–99)
HCG-TM SERPL-ACNC: <3 IU/L
HCT VFR BLD AUTO: 35.8 % (ref 40–53)
HGB BLD-MCNC: 12.1 G/DL (ref 13.3–17.7)
IMM GRANULOCYTES # BLD: ABNORMAL 10*3/UL
IMM GRANULOCYTES NFR BLD: ABNORMAL %
LDH SERPL L TO P-CCNC: 244 U/L (ref 0–250)
LYMPHOCYTES # BLD AUTO: ABNORMAL 10*3/UL
LYMPHOCYTES # BLD MANUAL: 1.1 10E3/UL (ref 0.8–5.3)
LYMPHOCYTES NFR BLD AUTO: ABNORMAL %
LYMPHOCYTES NFR BLD MANUAL: 39 %
MAGNESIUM SERPL-MCNC: 1.9 MG/DL (ref 1.7–2.3)
MCH RBC QN AUTO: 29.3 PG (ref 26.5–33)
MCHC RBC AUTO-ENTMCNC: 33.8 G/DL (ref 31.5–36.5)
MCV RBC AUTO: 87 FL (ref 78–100)
MONOCYTES # BLD AUTO: ABNORMAL 10*3/UL
MONOCYTES # BLD MANUAL: 0.6 10E3/UL (ref 0–1.3)
MONOCYTES NFR BLD AUTO: ABNORMAL %
MONOCYTES NFR BLD MANUAL: 23 %
MYELOCYTES # BLD MANUAL: 0 10E3/UL
MYELOCYTES NFR BLD MANUAL: 1 %
NEUTROPHILS # BLD AUTO: ABNORMAL 10*3/UL
NEUTROPHILS # BLD MANUAL: 0.9 10E3/UL (ref 1.6–8.3)
NEUTROPHILS NFR BLD AUTO: ABNORMAL %
NEUTROPHILS NFR BLD MANUAL: 33 %
NRBC # BLD AUTO: 0 10E3/UL
NRBC BLD AUTO-RTO: 0 /100
PLAT MORPH BLD: ABNORMAL
PLATELET # BLD AUTO: 259 10E3/UL (ref 150–450)
POTASSIUM SERPL-SCNC: 4.6 MMOL/L (ref 3.4–5.3)
PROT SERPL-MCNC: 7 G/DL (ref 6.4–8.3)
RBC # BLD AUTO: 4.13 10E6/UL (ref 4.4–5.9)
RBC MORPH BLD: ABNORMAL
SODIUM SERPL-SCNC: 138 MMOL/L (ref 135–145)
WBC # BLD AUTO: 2.7 10E3/UL (ref 4–11)

## 2024-01-08 PROCEDURE — 96417 CHEMO IV INFUS EACH ADDL SEQ: CPT

## 2024-01-08 PROCEDURE — 85007 BL SMEAR W/DIFF WBC COUNT: CPT | Performed by: NURSE PRACTITIONER

## 2024-01-08 PROCEDURE — 85027 COMPLETE CBC AUTOMATED: CPT | Performed by: NURSE PRACTITIONER

## 2024-01-08 PROCEDURE — 82247 BILIRUBIN TOTAL: CPT | Performed by: NURSE PRACTITIONER

## 2024-01-08 PROCEDURE — 96413 CHEMO IV INFUSION 1 HR: CPT

## 2024-01-08 PROCEDURE — 96361 HYDRATE IV INFUSION ADD-ON: CPT

## 2024-01-08 PROCEDURE — 96367 TX/PROPH/DG ADDL SEQ IV INF: CPT

## 2024-01-08 PROCEDURE — 83615 LACTATE (LD) (LDH) ENZYME: CPT | Performed by: NURSE PRACTITIONER

## 2024-01-08 PROCEDURE — 36591 DRAW BLOOD OFF VENOUS DEVICE: CPT

## 2024-01-08 PROCEDURE — 83735 ASSAY OF MAGNESIUM: CPT | Performed by: NURSE PRACTITIONER

## 2024-01-08 PROCEDURE — 250N000011 HC RX IP 250 OP 636: Performed by: NURSE PRACTITIONER

## 2024-01-08 PROCEDURE — 82105 ALPHA-FETOPROTEIN SERUM: CPT | Performed by: NURSE PRACTITIONER

## 2024-01-08 PROCEDURE — 84702 CHORIONIC GONADOTROPIN TEST: CPT | Performed by: NURSE PRACTITIONER

## 2024-01-08 PROCEDURE — 96375 TX/PRO/DX INJ NEW DRUG ADDON: CPT

## 2024-01-08 PROCEDURE — 258N000003 HC RX IP 258 OP 636: Performed by: NURSE PRACTITIONER

## 2024-01-08 RX ORDER — SODIUM CHLORIDE 9 MG/ML
1000 INJECTION, SOLUTION INTRAVENOUS ONCE
Status: COMPLETED | OUTPATIENT
Start: 2024-01-08 | End: 2024-01-08

## 2024-01-08 RX ORDER — HEPARIN SODIUM (PORCINE) LOCK FLUSH IV SOLN 100 UNIT/ML 100 UNIT/ML
5 SOLUTION INTRAVENOUS
Status: DISCONTINUED | OUTPATIENT
Start: 2024-01-08 | End: 2024-01-08 | Stop reason: HOSPADM

## 2024-01-08 RX ORDER — PALONOSETRON 0.05 MG/ML
0.25 INJECTION, SOLUTION INTRAVENOUS ONCE
Status: COMPLETED | OUTPATIENT
Start: 2024-01-08 | End: 2024-01-08

## 2024-01-08 RX ORDER — HEPARIN SODIUM,PORCINE 10 UNIT/ML
5-20 VIAL (ML) INTRAVENOUS DAILY PRN
Status: DISCONTINUED | OUTPATIENT
Start: 2024-01-08 | End: 2024-01-08 | Stop reason: HOSPADM

## 2024-01-08 RX ADMIN — BLEOMYCIN SULFATE 30 UNITS: 30 INJECTION, POWDER, LYOPHILIZED, FOR SOLUTION INTRAMUSCULAR; INTRAPLEURAL; INTRAVENOUS; SUBCUTANEOUS at 12:25

## 2024-01-08 RX ADMIN — PALONOSETRON HYDROCHLORIDE 0.25 MG: 0.25 INJECTION INTRAVENOUS at 09:42

## 2024-01-08 RX ADMIN — HEPARIN, PORCINE (PF) 10 UNIT/ML INTRAVENOUS SYRINGE 5 ML: at 12:51

## 2024-01-08 RX ADMIN — ETOPOSIDE 210 MG: 20 INJECTION INTRAVENOUS at 09:48

## 2024-01-08 RX ADMIN — FOSAPREPITANT: 150 INJECTION, POWDER, LYOPHILIZED, FOR SOLUTION INTRAVENOUS at 09:19

## 2024-01-08 RX ADMIN — SODIUM CHLORIDE 1000 ML: 9 INJECTION, SOLUTION INTRAVENOUS at 08:39

## 2024-01-08 RX ADMIN — CISPLATIN 40 MG: 1 INJECTION, SOLUTION INTRAVENOUS at 11:18

## 2024-01-08 ASSESSMENT — PAIN SCALES - GENERAL: PAINLEVEL: NO PAIN (0)

## 2024-01-08 NOTE — PROGRESS NOTES
Nursing Note:  Jayson Banegas presents today for port labs.    Patient seen by provider today: No   present during visit today: Not Applicable.    Note: N/A.    Intravenous Access:  Labs drawn without difficulty.  Implanted Port.    Discharge Plan:   Patient was sent to [facility] for infusion appointment.    Dorothea Pfeiffer RN

## 2024-01-08 NOTE — PROGRESS NOTES
Infusion Nursing Note:  Jayson Banegas presents today for cisplatin, etoposide, bleomycin.    Patient seen by provider today: No   present during visit today: Not Applicable.    Note: CAROL Wiseman ok'd chemo today despite ANC of 0.9.      Intravenous Access:  Implanted Port.    Treatment Conditions:  Lab Results   Component Value Date    HGB 12.1 (L) 01/08/2024    WBC 2.7 (L) 01/08/2024    ANEU 0.9 (L) 01/08/2024    ANEUTAUTO 2.7 01/02/2024     01/08/2024        Lab Results   Component Value Date     01/08/2024    POTASSIUM 4.6 01/08/2024    MAG 1.9 01/08/2024    CR 0.75 01/08/2024    ALEX 9.5 01/08/2024    BILITOTAL 0.2 01/08/2024    ALBUMIN 4.3 01/08/2024    ALT 31 01/08/2024    AST 25 01/08/2024       Results reviewed, labs did NOT meet treatment parameters: Klak OK'd chemo despite ANC of 0.9 .      Post Infusion Assessment:  Patient tolerated infusion without incident.  Site patent and intact, free from redness, edema or discomfort.  No evidence of extravasations.  Access discontinued per protocol.       Discharge Plan:   Patient and/or family verbalized understanding of discharge instructions and all questions answered.  AVS to patient via [employer].  Patient will return tomorrow for next appointment.   Patient discharged in stable condition accompanied by: grandmother.  Departure Mode: Ambulatory.      Elmira Earl RN

## 2024-01-09 ENCOUNTER — INFUSION THERAPY VISIT (OUTPATIENT)
Dept: INFUSION THERAPY | Facility: CLINIC | Age: 29
End: 2024-01-09
Attending: INTERNAL MEDICINE
Payer: COMMERCIAL

## 2024-01-09 VITALS
SYSTOLIC BLOOD PRESSURE: 112 MMHG | DIASTOLIC BLOOD PRESSURE: 68 MMHG | TEMPERATURE: 97.7 F | HEART RATE: 90 BPM | OXYGEN SATURATION: 96 % | RESPIRATION RATE: 18 BRPM

## 2024-01-09 DIAGNOSIS — C80.1 EMBRYONAL CARCINOMA (H): Primary | ICD-10-CM

## 2024-01-09 PROCEDURE — 96413 CHEMO IV INFUSION 1 HR: CPT

## 2024-01-09 PROCEDURE — 96375 TX/PRO/DX INJ NEW DRUG ADDON: CPT

## 2024-01-09 PROCEDURE — 96415 CHEMO IV INFUSION ADDL HR: CPT

## 2024-01-09 PROCEDURE — 258N000003 HC RX IP 258 OP 636: Performed by: NURSE PRACTITIONER

## 2024-01-09 PROCEDURE — 96417 CHEMO IV INFUS EACH ADDL SEQ: CPT

## 2024-01-09 PROCEDURE — 250N000011 HC RX IP 250 OP 636: Performed by: NURSE PRACTITIONER

## 2024-01-09 RX ORDER — SODIUM CHLORIDE 9 MG/ML
1000 INJECTION, SOLUTION INTRAVENOUS ONCE
Status: COMPLETED | OUTPATIENT
Start: 2024-01-09 | End: 2024-01-09

## 2024-01-09 RX ORDER — HEPARIN SODIUM,PORCINE 10 UNIT/ML
5-20 VIAL (ML) INTRAVENOUS DAILY PRN
Status: DISCONTINUED | OUTPATIENT
Start: 2024-01-09 | End: 2024-01-09 | Stop reason: HOSPADM

## 2024-01-09 RX ORDER — LORAZEPAM 2 MG/ML
0.5 INJECTION INTRAMUSCULAR EVERY 4 HOURS PRN
Status: DISCONTINUED | OUTPATIENT
Start: 2024-01-09 | End: 2024-01-09 | Stop reason: HOSPADM

## 2024-01-09 RX ADMIN — ETOPOSIDE 210 MG: 20 INJECTION INTRAVENOUS at 11:02

## 2024-01-09 RX ADMIN — CISPLATIN 40 MG: 1 INJECTION, SOLUTION INTRAVENOUS at 12:39

## 2024-01-09 RX ADMIN — DEXAMETHASONE SODIUM PHOSPHATE 12 MG: 10 INJECTION, SOLUTION INTRAMUSCULAR; INTRAVENOUS at 10:20

## 2024-01-09 RX ADMIN — HEPARIN, PORCINE (PF) 10 UNIT/ML INTRAVENOUS SYRINGE 5 ML: at 13:55

## 2024-01-09 RX ADMIN — LORAZEPAM 0.5 MG: 2 INJECTION INTRAMUSCULAR; INTRAVENOUS at 10:18

## 2024-01-09 RX ADMIN — SODIUM CHLORIDE 1000 ML: 9 INJECTION, SOLUTION INTRAVENOUS at 10:10

## 2024-01-09 ASSESSMENT — PAIN SCALES - GENERAL: PAINLEVEL: SEVERE PAIN (6)

## 2024-01-09 NOTE — PROGRESS NOTES
Infusion Nursing Note:  Jayson Banegas presents today for C4D1 Etoposide, Cisplatin.    Patient seen by provider today: No   present during visit today: Not Applicable.    Note: Patient reports no significant changes since infusion yesterday. Continues with intermittent sharp pain in bilateral sides of abdomen, this is unchanged and he is managing with dilaudid and MS contin. Nausea slightly increased, patient is using olanzapine, compazine and zofran at home for relief, however he has not yet taken any antiemetics this morning. Lorazepam given per prn meds today per patient request. Patient reported improvement in nausea prior to discharge.      Intravenous Access:  Implanted Port in place on arrival, excellent blood return noted.    Treatment Conditions:  No labs ordered for today. Lab results reviewed from 1/8, low ANC of 0.9 approved by Preston Wiseman NP per infusion note.       Post Infusion Assessment:  Patient tolerated infusion without incident.  Blood return noted pre and post infusion.  Site patent and intact, free from redness, edema or discomfort.  No evidence of extravasations.   Port access left in place for infusion tomorrow.      Discharge Plan:   Discharge instructions reviewed with: Patient.  Patient and/or family verbalized understanding of discharge instructions and all questions answered.  AVS to patient via [employer].  Patient will return 1/10/24 for next appointment.   Patient discharged in stable condition accompanied by: grandmother.  Departure Mode: Ambulatory.      Daniella Angel RN

## 2024-01-10 ENCOUNTER — INFUSION THERAPY VISIT (OUTPATIENT)
Dept: INFUSION THERAPY | Facility: CLINIC | Age: 29
End: 2024-01-10
Attending: INTERNAL MEDICINE
Payer: COMMERCIAL

## 2024-01-10 VITALS
TEMPERATURE: 97.5 F | OXYGEN SATURATION: 94 % | SYSTOLIC BLOOD PRESSURE: 112 MMHG | DIASTOLIC BLOOD PRESSURE: 63 MMHG | HEART RATE: 95 BPM | RESPIRATION RATE: 20 BRPM

## 2024-01-10 DIAGNOSIS — C80.1 EMBRYONAL CARCINOMA (H): Primary | ICD-10-CM

## 2024-01-10 PROCEDURE — 96415 CHEMO IV INFUSION ADDL HR: CPT

## 2024-01-10 PROCEDURE — 96413 CHEMO IV INFUSION 1 HR: CPT

## 2024-01-10 PROCEDURE — 96375 TX/PRO/DX INJ NEW DRUG ADDON: CPT

## 2024-01-10 PROCEDURE — 250N000011 HC RX IP 250 OP 636: Performed by: NURSE PRACTITIONER

## 2024-01-10 PROCEDURE — 258N000003 HC RX IP 258 OP 636: Performed by: NURSE PRACTITIONER

## 2024-01-10 PROCEDURE — 96417 CHEMO IV INFUS EACH ADDL SEQ: CPT

## 2024-01-10 RX ORDER — LORAZEPAM 2 MG/ML
0.5 INJECTION INTRAMUSCULAR EVERY 4 HOURS PRN
Status: DISCONTINUED | OUTPATIENT
Start: 2024-01-10 | End: 2024-01-10 | Stop reason: HOSPADM

## 2024-01-10 RX ORDER — HEPARIN SODIUM (PORCINE) LOCK FLUSH IV SOLN 100 UNIT/ML 100 UNIT/ML
5 SOLUTION INTRAVENOUS
Status: DISCONTINUED | OUTPATIENT
Start: 2024-01-10 | End: 2024-01-10 | Stop reason: HOSPADM

## 2024-01-10 RX ORDER — SODIUM CHLORIDE 9 MG/ML
1000 INJECTION, SOLUTION INTRAVENOUS ONCE
Status: COMPLETED | OUTPATIENT
Start: 2024-01-10 | End: 2024-01-10

## 2024-01-10 RX ADMIN — DEXAMETHASONE SODIUM PHOSPHATE 12 MG: 10 INJECTION, SOLUTION INTRAMUSCULAR; INTRAVENOUS at 10:11

## 2024-01-10 RX ADMIN — Medication 5 ML: at 13:42

## 2024-01-10 RX ADMIN — ETOPOSIDE 210 MG: 20 INJECTION INTRAVENOUS at 11:00

## 2024-01-10 RX ADMIN — SODIUM CHLORIDE 1000 ML: 9 INJECTION, SOLUTION INTRAVENOUS at 10:06

## 2024-01-10 RX ADMIN — FAMOTIDINE 20 MG: 10 INJECTION, SOLUTION INTRAVENOUS at 10:30

## 2024-01-10 RX ADMIN — LORAZEPAM 0.5 MG: 2 INJECTION INTRAMUSCULAR; INTRAVENOUS at 10:28

## 2024-01-10 RX ADMIN — CISPLATIN 40 MG: 1 INJECTION, SOLUTION INTRAVENOUS at 12:41

## 2024-01-10 ASSESSMENT — PAIN SCALES - GENERAL: PAINLEVEL: MILD PAIN (3)

## 2024-01-10 NOTE — PROGRESS NOTES
Infusion Nursing Note:  Jayson Banegas presents today for Cycle 4 Day 3 Etoposide, Cisplatin.    Patient seen by provider today: No   present during visit today: Not Applicable.    Note: Ativan and Pepcid given today for nausea.        Intravenous Access:  Implanted Port.    Treatment Conditions:  See labs from 1/8/24.      Post Infusion Assessment:  Patient tolerated infusion without incident.  Blood return noted pre and post infusion.  Site patent and intact, free from redness, edema or discomfort.  No evidence of extravasations.   Port left accessed for tomorrow's infusion.      Discharge Plan:   Patient declined prescription refills.  Discharge instructions reviewed with: Patient and Grandmother  Patient verbalized understanding of discharge instructions and all questions answered.  AVS to patient via [employer].  Patient will return 1/11/24 for next appointment.   Patient discharged in stable condition accompanied by: self and Grandmother  Departure Mode: Ambulatory.      Randa Raymond RN

## 2024-01-11 ENCOUNTER — INFUSION THERAPY VISIT (OUTPATIENT)
Dept: INFUSION THERAPY | Facility: CLINIC | Age: 29
End: 2024-01-11
Attending: INTERNAL MEDICINE
Payer: COMMERCIAL

## 2024-01-11 VITALS
RESPIRATION RATE: 18 BRPM | DIASTOLIC BLOOD PRESSURE: 66 MMHG | SYSTOLIC BLOOD PRESSURE: 101 MMHG | TEMPERATURE: 97.5 F | OXYGEN SATURATION: 97 % | HEART RATE: 87 BPM

## 2024-01-11 DIAGNOSIS — C80.1 EMBRYONAL CARCINOMA (H): Primary | ICD-10-CM

## 2024-01-11 PROCEDURE — 96367 TX/PROPH/DG ADDL SEQ IV INF: CPT

## 2024-01-11 PROCEDURE — 96415 CHEMO IV INFUSION ADDL HR: CPT

## 2024-01-11 PROCEDURE — 96417 CHEMO IV INFUS EACH ADDL SEQ: CPT

## 2024-01-11 PROCEDURE — 258N000003 HC RX IP 258 OP 636: Performed by: NURSE PRACTITIONER

## 2024-01-11 PROCEDURE — 96413 CHEMO IV INFUSION 1 HR: CPT

## 2024-01-11 PROCEDURE — 96375 TX/PRO/DX INJ NEW DRUG ADDON: CPT

## 2024-01-11 PROCEDURE — 250N000011 HC RX IP 250 OP 636: Performed by: NURSE PRACTITIONER

## 2024-01-11 RX ORDER — PALONOSETRON 0.05 MG/ML
0.25 INJECTION, SOLUTION INTRAVENOUS ONCE
Status: COMPLETED | OUTPATIENT
Start: 2024-01-11 | End: 2024-01-11

## 2024-01-11 RX ORDER — HEPARIN SODIUM (PORCINE) LOCK FLUSH IV SOLN 100 UNIT/ML 100 UNIT/ML
5 SOLUTION INTRAVENOUS
Status: DISCONTINUED | OUTPATIENT
Start: 2024-01-11 | End: 2024-01-11 | Stop reason: HOSPADM

## 2024-01-11 RX ORDER — SODIUM CHLORIDE 9 MG/ML
1000 INJECTION, SOLUTION INTRAVENOUS ONCE
Status: COMPLETED | OUTPATIENT
Start: 2024-01-11 | End: 2024-01-11

## 2024-01-11 RX ORDER — HEPARIN SODIUM,PORCINE 10 UNIT/ML
5-20 VIAL (ML) INTRAVENOUS DAILY PRN
Status: DISCONTINUED | OUTPATIENT
Start: 2024-01-11 | End: 2024-01-11 | Stop reason: HOSPADM

## 2024-01-11 RX ADMIN — PALONOSETRON HYDROCHLORIDE 0.25 MG: 0.25 INJECTION INTRAVENOUS at 10:21

## 2024-01-11 RX ADMIN — ETOPOSIDE 210 MG: 20 INJECTION INTRAVENOUS at 11:02

## 2024-01-11 RX ADMIN — Medication 5 ML: at 13:48

## 2024-01-11 RX ADMIN — SODIUM CHLORIDE 1000 ML: 9 INJECTION, SOLUTION INTRAVENOUS at 10:06

## 2024-01-11 RX ADMIN — FAMOTIDINE 20 MG: 10 INJECTION, SOLUTION INTRAVENOUS at 10:22

## 2024-01-11 RX ADMIN — FOSAPREPITANT: 150 INJECTION, POWDER, LYOPHILIZED, FOR SOLUTION INTRAVENOUS at 10:29

## 2024-01-11 RX ADMIN — CISPLATIN 40 MG: 1 INJECTION, SOLUTION INTRAVENOUS at 12:38

## 2024-01-11 ASSESSMENT — PAIN SCALES - GENERAL: PAINLEVEL: MODERATE PAIN (4)

## 2024-01-11 NOTE — PROGRESS NOTES
Infusion Nursing Note:  Jayson Banegas presents today for C4D4 Cisplatin/Etoposide.    Patient seen by provider today: No   present during visit today: Not Applicable.    Note: Patient very nauseated this morning. Patient states his abdomen/chest is hurting today. Patient states the pain in his chest comes and goes, and feels it could be from acid reflux. Writer will release pepcid today with other premeds. After pre-meds and IVF's administered, patient stated he feels better, and the pain has subsided.       Intravenous Access:  Implanted Port.    Treatment Conditions:  Lab Results   Component Value Date    HGB 12.1 (L) 01/08/2024    WBC 2.7 (L) 01/08/2024    ANEU 0.9 (L) 01/08/2024    ANEUTAUTO 2.7 01/02/2024     01/08/2024        Lab Results   Component Value Date     01/08/2024    POTASSIUM 4.6 01/08/2024    MAG 1.9 01/08/2024    CR 0.75 01/08/2024    ALEX 9.5 01/08/2024    BILITOTAL 0.2 01/08/2024    ALBUMIN 4.3 01/08/2024    ALT 31 01/08/2024    AST 25 01/08/2024         Post Infusion Assessment:  Patient tolerated infusion without incident.  Blood return noted pre and post infusion.  Site patent and intact, free from redness, edema or discomfort.  No evidence of extravasations.  Port a cath locked with heparin per protocol, and needle left in for tomorrow's infusion.       Discharge Plan:   Discharge instructions reviewed with: Patient and Family.  Patient and/or family verbalized understanding of discharge instructions and all questions answered.  AVS to patient via [employer].  Patient will return 1/12/24 for next appointment.   Patient discharged in stable condition accompanied by: grandmother.  Departure Mode: Ambulatory.      Ana Alexandra RN

## 2024-01-12 ENCOUNTER — INFUSION THERAPY VISIT (OUTPATIENT)
Dept: INFUSION THERAPY | Facility: CLINIC | Age: 29
End: 2024-01-12
Attending: INTERNAL MEDICINE
Payer: COMMERCIAL

## 2024-01-12 VITALS
HEART RATE: 96 BPM | RESPIRATION RATE: 16 BRPM | OXYGEN SATURATION: 98 % | TEMPERATURE: 97.8 F | DIASTOLIC BLOOD PRESSURE: 80 MMHG | SYSTOLIC BLOOD PRESSURE: 125 MMHG

## 2024-01-12 DIAGNOSIS — C80.1 EMBRYONAL CARCINOMA (H): Primary | ICD-10-CM

## 2024-01-12 PROCEDURE — 96367 TX/PROPH/DG ADDL SEQ IV INF: CPT

## 2024-01-12 PROCEDURE — 96413 CHEMO IV INFUSION 1 HR: CPT

## 2024-01-12 PROCEDURE — 250N000011 HC RX IP 250 OP 636: Performed by: NURSE PRACTITIONER

## 2024-01-12 PROCEDURE — 96417 CHEMO IV INFUS EACH ADDL SEQ: CPT

## 2024-01-12 PROCEDURE — 96375 TX/PRO/DX INJ NEW DRUG ADDON: CPT

## 2024-01-12 PROCEDURE — 258N000003 HC RX IP 258 OP 636: Performed by: NURSE PRACTITIONER

## 2024-01-12 RX ORDER — SODIUM CHLORIDE 9 MG/ML
1000 INJECTION, SOLUTION INTRAVENOUS ONCE
Status: COMPLETED | OUTPATIENT
Start: 2024-01-12 | End: 2024-01-12

## 2024-01-12 RX ORDER — HEPARIN SODIUM (PORCINE) LOCK FLUSH IV SOLN 100 UNIT/ML 100 UNIT/ML
5 SOLUTION INTRAVENOUS
Status: DISCONTINUED | OUTPATIENT
Start: 2024-01-12 | End: 2024-01-12 | Stop reason: HOSPADM

## 2024-01-12 RX ADMIN — CISPLATIN 40 MG: 1 INJECTION, SOLUTION INTRAVENOUS at 11:55

## 2024-01-12 RX ADMIN — DEXAMETHASONE SODIUM PHOSPHATE 12 MG: 10 INJECTION, SOLUTION INTRAMUSCULAR; INTRAVENOUS at 09:34

## 2024-01-12 RX ADMIN — SODIUM CHLORIDE 1000 ML: 9 INJECTION, SOLUTION INTRAVENOUS at 09:24

## 2024-01-12 RX ADMIN — ETOPOSIDE 210 MG: 20 INJECTION INTRAVENOUS at 10:06

## 2024-01-12 RX ADMIN — Medication 5 ML: at 13:02

## 2024-01-12 RX ADMIN — FAMOTIDINE 20 MG: 10 INJECTION, SOLUTION INTRAVENOUS at 09:29

## 2024-01-12 NOTE — PROGRESS NOTES
Infusion Nursing Note:  Jayson Banegas presents today for C4D5 of etoposide and cisplatin.    Patient seen by provider today: No   present during visit today: Not Applicable.    Note: Pt presents today for C4D5 of etoposide and cisplatin.  States he is feeling well today, better than yesterday with nausea, fatigue and stomach pain improved.        Intravenous Access:  Implanted Port.    Treatment Conditions:  Not Applicable.      Post Infusion Assessment:  Patient tolerated infusion without incident.  Blood return noted pre and post infusion.  Site patent and intact, free from redness, edema or discomfort.  No evidence of extravasations.  Access discontinued per protocol.       Discharge Plan:   Patient declined prescription refills.  Discharge instructions reviewed with: Patient and Family.  Patient and/or family verbalized understanding of discharge instructions and all questions answered.  AVS to patient via [employer].  Patient will return 1/15/24 for next appointment.   Patient discharged in stable condition accompanied by: self and grandmother.  Departure Mode: Ambulatory.      Jennifer Ann RN

## 2024-01-14 ENCOUNTER — MYC REFILL (OUTPATIENT)
Dept: ONCOLOGY | Facility: CLINIC | Age: 29
End: 2024-01-14
Payer: COMMERCIAL

## 2024-01-14 DIAGNOSIS — C80.1 EMBRYONAL CARCINOMA (H): ICD-10-CM

## 2024-01-14 DIAGNOSIS — G89.3 CANCER ASSOCIATED PAIN: ICD-10-CM

## 2024-01-14 DIAGNOSIS — K21.00 GASTROESOPHAGEAL REFLUX DISEASE WITH ESOPHAGITIS WITHOUT HEMORRHAGE: ICD-10-CM

## 2024-01-15 ENCOUNTER — LAB (OUTPATIENT)
Dept: INFUSION THERAPY | Facility: CLINIC | Age: 29
End: 2024-01-15
Attending: INTERNAL MEDICINE
Payer: COMMERCIAL

## 2024-01-15 VITALS
OXYGEN SATURATION: 97 % | DIASTOLIC BLOOD PRESSURE: 72 MMHG | SYSTOLIC BLOOD PRESSURE: 106 MMHG | HEART RATE: 84 BPM | RESPIRATION RATE: 16 BRPM | TEMPERATURE: 98.3 F

## 2024-01-15 VITALS — WEIGHT: 209 LBS | BODY MASS INDEX: 30.86 KG/M2

## 2024-01-15 DIAGNOSIS — C80.1 EMBRYONAL CARCINOMA (H): Primary | ICD-10-CM

## 2024-01-15 LAB
ALBUMIN SERPL BCG-MCNC: 4.1 G/DL (ref 3.5–5.2)
ALP SERPL-CCNC: 49 U/L (ref 40–150)
ALT SERPL W P-5'-P-CCNC: 22 U/L (ref 0–70)
ANION GAP SERPL CALCULATED.3IONS-SCNC: 9 MMOL/L (ref 7–15)
AST SERPL W P-5'-P-CCNC: 23 U/L (ref 0–45)
BASOPHILS # BLD AUTO: 0 10E3/UL (ref 0–0.2)
BASOPHILS NFR BLD AUTO: 1 %
BILIRUB SERPL-MCNC: 0.2 MG/DL
BUN SERPL-MCNC: 13.4 MG/DL (ref 6–20)
CALCIUM SERPL-MCNC: 9.2 MG/DL (ref 8.6–10)
CHLORIDE SERPL-SCNC: 99 MMOL/L (ref 98–107)
CREAT SERPL-MCNC: 0.68 MG/DL (ref 0.67–1.17)
DEPRECATED HCO3 PLAS-SCNC: 28 MMOL/L (ref 22–29)
EGFRCR SERPLBLD CKD-EPI 2021: >90 ML/MIN/1.73M2
EOSINOPHIL # BLD AUTO: 0.1 10E3/UL (ref 0–0.7)
EOSINOPHIL NFR BLD AUTO: 2 %
ERYTHROCYTE [DISTWIDTH] IN BLOOD BY AUTOMATED COUNT: 16 % (ref 10–15)
GLUCOSE SERPL-MCNC: 114 MG/DL (ref 70–99)
HCT VFR BLD AUTO: 34.6 % (ref 40–53)
HGB BLD-MCNC: 12 G/DL (ref 13.3–17.7)
IMM GRANULOCYTES # BLD: 0.1 10E3/UL
IMM GRANULOCYTES NFR BLD: 1 %
LYMPHOCYTES # BLD AUTO: 1.2 10E3/UL (ref 0.8–5.3)
LYMPHOCYTES NFR BLD AUTO: 24 %
MAGNESIUM SERPL-MCNC: 1.8 MG/DL (ref 1.7–2.3)
MCH RBC QN AUTO: 30.2 PG (ref 26.5–33)
MCHC RBC AUTO-ENTMCNC: 34.7 G/DL (ref 31.5–36.5)
MCV RBC AUTO: 87 FL (ref 78–100)
MONOCYTES # BLD AUTO: 0.1 10E3/UL (ref 0–1.3)
MONOCYTES NFR BLD AUTO: 2 %
NEUTROPHILS # BLD AUTO: 3.5 10E3/UL (ref 1.6–8.3)
NEUTROPHILS NFR BLD AUTO: 70 %
NRBC # BLD AUTO: 0 10E3/UL
NRBC BLD AUTO-RTO: 0 /100
PLATELET # BLD AUTO: 340 10E3/UL (ref 150–450)
POTASSIUM SERPL-SCNC: 4.2 MMOL/L (ref 3.4–5.3)
PROT SERPL-MCNC: 6.8 G/DL (ref 6.4–8.3)
RBC # BLD AUTO: 3.98 10E6/UL (ref 4.4–5.9)
SODIUM SERPL-SCNC: 136 MMOL/L (ref 135–145)
WBC # BLD AUTO: 4.9 10E3/UL (ref 4–11)

## 2024-01-15 PROCEDURE — 96413 CHEMO IV INFUSION 1 HR: CPT

## 2024-01-15 PROCEDURE — 85004 AUTOMATED DIFF WBC COUNT: CPT | Performed by: NURSE PRACTITIONER

## 2024-01-15 PROCEDURE — 83735 ASSAY OF MAGNESIUM: CPT | Performed by: INTERNAL MEDICINE

## 2024-01-15 PROCEDURE — 36591 DRAW BLOOD OFF VENOUS DEVICE: CPT | Performed by: NURSE PRACTITIONER

## 2024-01-15 PROCEDURE — 258N000003 HC RX IP 258 OP 636: Performed by: NURSE PRACTITIONER

## 2024-01-15 PROCEDURE — 250N000011 HC RX IP 250 OP 636: Mod: JZ | Performed by: NURSE PRACTITIONER

## 2024-01-15 PROCEDURE — 96365 THER/PROPH/DIAG IV INF INIT: CPT

## 2024-01-15 PROCEDURE — 80053 COMPREHEN METABOLIC PANEL: CPT | Performed by: INTERNAL MEDICINE

## 2024-01-15 RX ORDER — HEPARIN SODIUM,PORCINE 10 UNIT/ML
5-20 VIAL (ML) INTRAVENOUS DAILY PRN
Status: DISCONTINUED | OUTPATIENT
Start: 2024-01-15 | End: 2024-01-15 | Stop reason: HOSPADM

## 2024-01-15 RX ORDER — HYDROMORPHONE HYDROCHLORIDE 2 MG/1
2-4 TABLET ORAL EVERY 4 HOURS PRN
Qty: 60 TABLET | Refills: 0 | Status: SHIPPED | OUTPATIENT
Start: 2024-01-15 | End: 2024-01-24

## 2024-01-15 RX ORDER — PANTOPRAZOLE SODIUM 40 MG/1
40 TABLET, DELAYED RELEASE ORAL DAILY
Qty: 90 TABLET | Refills: 3 | Status: SHIPPED | OUTPATIENT
Start: 2024-01-15 | End: 2024-05-01

## 2024-01-15 RX ORDER — HEPARIN SODIUM (PORCINE) LOCK FLUSH IV SOLN 100 UNIT/ML 100 UNIT/ML
5 SOLUTION INTRAVENOUS
Status: DISCONTINUED | OUTPATIENT
Start: 2024-01-15 | End: 2024-01-15 | Stop reason: HOSPADM

## 2024-01-15 RX ADMIN — Medication 5 ML: at 14:20

## 2024-01-15 RX ADMIN — BLEOMYCIN SULFATE 30 UNITS: 30 INJECTION, POWDER, LYOPHILIZED, FOR SOLUTION INTRAMUSCULAR; INTRAPLEURAL; INTRAVENOUS; SUBCUTANEOUS at 13:53

## 2024-01-15 RX ADMIN — SODIUM CHLORIDE 250 ML: 9 INJECTION, SOLUTION INTRAVENOUS at 13:53

## 2024-01-15 NOTE — PROGRESS NOTES
Nursing Note:  Jayson Banegas presents today for Port Labs.    Patient seen by provider today: No   present during visit today: Not Applicable.    Note: N/A.    Intravenous Access:  Labs drawn without difficulty.  Implanted Port.    Discharge Plan:   Patient was sent to [facility] for 1:30 PM appointment.    Tomás Velez RN

## 2024-01-15 NOTE — PROGRESS NOTES
Infusion Nursing Note:  Jayson Hortonleary presents today for C4D8 Bleomycin.    Patient seen by provider today: No   present during visit today: Not Applicable.    Note: Pt reports to having mild nausea. IV antiemetics offered but pt declined. Pt also having indigestion/reflux- on protonix and taking tums as needed.       Intravenous Access:  Implanted Port.    Treatment Conditions:  Lab Results   Component Value Date    HGB 12.0 (L) 01/15/2024    WBC 4.9 01/15/2024    ANEU 0.9 (L) 01/08/2024    ANEUTAUTO 3.5 01/15/2024     01/15/2024        Lab Results   Component Value Date     01/15/2024    POTASSIUM 4.2 01/15/2024    MAG 1.8 01/15/2024    CR 0.68 01/15/2024    ALEX 9.2 01/15/2024    BILITOTAL 0.2 01/15/2024    ALBUMIN 4.1 01/15/2024    ALT 22 01/15/2024    AST 23 01/15/2024       Results reviewed, labs MET treatment parameters, ok to proceed with treatment.      Post Infusion Assessment:  Patient tolerated infusion without incident.  Blood return noted pre and post infusion.  Site patent and intact, free from redness, edema or discomfort.  No evidence of extravasations.  Access discontinued per protocol.       Discharge Plan:   Discharge instructions reviewed with: Patient and Family.  Patient and/or family verbalized understanding of discharge instructions and all questions answered.  AVS to patient via [employer].  Patient will return 1/22/24 for next appointment.   Patient discharged in stable condition accompanied by: self and mother.  Departure Mode: Ambulatory.      Jorge Holguin RN

## 2024-01-16 ENCOUNTER — VIRTUAL VISIT (OUTPATIENT)
Dept: PALLIATIVE CARE | Facility: CLINIC | Age: 29
End: 2024-01-16
Attending: INTERNAL MEDICINE
Payer: COMMERCIAL

## 2024-01-16 VITALS — BODY MASS INDEX: 30.51 KG/M2 | WEIGHT: 206 LBS | HEIGHT: 69 IN

## 2024-01-16 DIAGNOSIS — F41.1 GAD (GENERALIZED ANXIETY DISORDER): Chronic | ICD-10-CM

## 2024-01-16 DIAGNOSIS — C80.1 EMBRYONAL CARCINOMA (H): Primary | ICD-10-CM

## 2024-01-16 DIAGNOSIS — G89.3 NEOPLASM RELATED PAIN: ICD-10-CM

## 2024-01-16 PROCEDURE — 99215 OFFICE O/P EST HI 40 MIN: CPT | Mod: 95 | Performed by: INTERNAL MEDICINE

## 2024-01-16 RX ORDER — SERTRALINE HYDROCHLORIDE 100 MG/1
150 TABLET, FILM COATED ORAL DAILY
Qty: 45 TABLET | Refills: 2 | Status: SHIPPED | OUTPATIENT
Start: 2024-01-16

## 2024-01-16 ASSESSMENT — PAIN SCALES - GENERAL: PAINLEVEL: MILD PAIN (3)

## 2024-01-16 NOTE — LETTER
"1/16/2024       RE: Jayson Banegas  [address]  [address]  [facility] [address]       Dear Colleague,    Thank you for referring your patient, Jayson Banegas, [facility]. Please see a copy of my visit note below.    Palliative Care Outpatient Clinic      Patient ID:  Medical - He has metastatic testicular cancer (embryonal carcinoma), presenting late Sept 2023 (mets to lung, intraabd & retroperitoneal lymph nodes).   11/2023 initiated BEP in the hospital     He has OBED; MDD since 13 years old, started ADs when 18; PTSD; alcohol use disorder--went through chem dep treatment 6 years ago, been in recovery since.     Social - Lives with girlfriend. Parents closely involved. Mom Deb. Stays with parents sometimes. Grandma stays with him during the day sometimes. 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     Care Planning - No HCD  10/2023 Dr Wick told him 73% 5 year survival. Cancer is curable.      Opioid Safety -   Hx of AUD in recovery x 6 years  Martin discussion with him about elevated addiction risk with opioids and bzds 11/2023.   11/2023 asked him not to drive until specifically told it's ok  D/w him plan to fully taper opioids off, assuming he responds well to his definitive anticancer tx, even if he has some residual pain.         History:  History gathered today from: patient, medical chart    Since I saw him: good scan in Dec  Next in Feb    Pain is improving overall but still present  Taking 2-3 hydromorphone tabs a day--2 mg at a time, half tabs    Now on 4 mg hydromorphone tabs, but takes half.  MSER 15 mg: bid  Lorazepam    Anxiety; intermittent distracting, severe worries about his health, ie the cancer, will it be cured or not.  \"I also feel like I'm having a lot of emotions because we are so close to the end.\" Last scheduled chemo next week. Scan 2/5.   On " "sertraline 100 mg a day; on it for 2 months.   Feels it's helped  Some days; very low mood    Bowels good  Much nausea post chemo for many days; exhausted; 'zombie.' Then resolves.    PE: Ht 1.753 m (5' 9\")   Wt 93.4 kg (206 lb)   BMI 30.42 kg/m     Wt Readings from Last 3 Encounters:   01/16/24 93.4 kg (206 lb)   01/15/24 94.8 kg (209 lb)   01/08/24 93.5 kg (206 lb 3.2 oz)     Alert NAD  Alopecia  Clear sensorium    Data reviewed:  I reviewed recent labs and imaging, my comments:  Na 136  Cr 0.68  Hgb 12   Plt 340    CT mid DEc--improvement in widespread LAD and lung metastases     database reviewed: y      Impression & Recommendations:  29 yo with metastatic testicular cancer nearing completing of definitive chemotherapy      Cancer related pain  Has subsided 2/2 treatment the last few months; now mostly moderate.  Another long discussion with him about importance of tapering off opioids entirely soon; he is getting curative treatment and it's important he is not on habit forming medications long term.  There's no arbitrary time frame for doing this, but eg if we see ongoing shrinkage on the scans we should work to back down and control pain with nonopioids, eg APAP, NSAIDs (he's never had low platelets on his chemo and could certainly resume NSAIDs post chemo I think), gabapentin.  If he's doing well in early Feb could consider stopping MSContin, I'd go down to just once a day for a few days then stop it.    Mood, anxiety, depression  Sertraline increase to 150 mg; he's done well with 100 mg so far. I updated his RX    Follow-up 1 mo    Over 40 minutes spent on the date of the encounter doing chart review, history and exam, patient education & counseling, documentation and other activities as noted above.    Thank you for involving us in the patient's care.   Jayesh Monahan MD / Palliative Medicine / Text me via [employer]  This note may have been composed with voice recognition software and there may be " mistranscriptions.          Again, thank you for allowing me to participate in the care of your patient.      Sincerely,    Jayesh Monahan MD

## 2024-01-16 NOTE — PROGRESS NOTES
"Palliative Care Outpatient Clinic      Patient ID:  Medical - He has metastatic testicular cancer (embryonal carcinoma), presenting late Sept 2023 (mets to lung, intraabd & retroperitoneal lymph nodes).   11/2023 initiated BEP in the hospital     He has OBED; MDD since 13 years old, started ADs when 18; PTSD; alcohol use disorder--went through chem dep treatment 6 years ago, been in recovery since.     Social - Lives with girlfriend. Parents closely involved. Mom Deb. Stays with parents sometimes. Grandma stays with him during the day sometimes. 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     Care Planning - No HCD  10/2023 Dr Wick told him 73% 5 year survival. Cancer is curable.      Opioid Safety -   Hx of AUD in recovery x 6 years  Martin discussion with him about elevated addiction risk with opioids and bzds 11/2023.   11/2023 asked him not to drive until specifically told it's ok  D/w him plan to fully taper opioids off, assuming he responds well to his definitive anticancer tx, even if he has some residual pain.         History:  History gathered today from: patient, medical chart    Since I saw him: good scan in Dec  Next in Feb    Pain is improving overall but still present  Taking 2-3 hydromorphone tabs a day--2 mg at a time, half tabs    Now on 4 mg hydromorphone tabs, but takes half.  MSER 15 mg: bid  Lorazepam    Anxiety; intermittent distracting, severe worries about his health, ie the cancer, will it be cured or not.  \"I also feel like I'm having a lot of emotions because we are so close to the end.\" Last scheduled chemo next week. Scan 2/5.   On sertraline 100 mg a day; on it for 2 months.   Feels it's helped  Some days; very low mood    Bowels good  Much nausea post chemo for many days; exhausted; 'zombie.' Then resolves.    PE: Ht 1.753 m (5' 9\")   Wt 93.4 kg (206 lb)   BMI 30.42 kg/m     Wt Readings from Last 3 Encounters:   01/16/24 93.4 kg (206 lb)   01/15/24 94.8 kg " (209 lb)   01/08/24 93.5 kg (206 lb 3.2 oz)     Alert NAD  Alopecia  Clear sensorium    Data reviewed:  I reviewed recent labs and imaging, my comments:  Na 136  Cr 0.68  Hgb 12   Plt 340    CT mid DEc--improvement in widespread LAD and lung metastases     database reviewed: y      Impression & Recommendations:  27 yo with metastatic testicular cancer nearing completing of definitive chemotherapy      Cancer related pain  Has subsided 2/2 treatment the last few months; now mostly moderate.  Another long discussion with him about importance of tapering off opioids entirely soon; he is getting curative treatment and it's important he is not on habit forming medications long term.  There's no arbitrary time frame for doing this, but eg if we see ongoing shrinkage on the scans we should work to back down and control pain with nonopioids, eg APAP, NSAIDs (he's never had low platelets on his chemo and could certainly resume NSAIDs post chemo I think), gabapentin.  If he's doing well in early Feb could consider stopping MSContin, I'd go down to just once a day for a few days then stop it.    Mood, anxiety, depression  Sertraline increase to 150 mg; he's done well with 100 mg so far. I updated his RX    Follow-up 1 mo    Over 40 minutes spent on the date of the encounter doing chart review, history and exam, patient education & counseling, documentation and other activities as noted above.    Thank you for involving us in the patient's care.   Jayesh Monahan MD / Palliative Medicine / Text me via [employer]  This note may have been composed with voice recognition software and there may be mistranscriptions.    Virtual Visit Details  Type of service:  Video Visit   Originating Location (pt. Location): Home  Distant Location (provider location):  Off-site  Platform used for Video Visit: Olinda

## 2024-01-16 NOTE — NURSING NOTE
Is the patient currently in the state of [place]? YES    Visit mode:VIDEO    If the visit is dropped, the patient can be reconnected by: VIDEO VISIT: Text to cell phone:   Telephone Information:   Mobile [phone]       Will anyone else be joining the visit? NO  (If patient encounters technical issues they should call [phone]    How would you like to obtain your AVS? MyChart    Are changes needed to the allergy or medication list? No    Reason for visit: VITO HERNANDEZ

## 2024-01-22 ENCOUNTER — LAB (OUTPATIENT)
Dept: INFUSION THERAPY | Facility: CLINIC | Age: 29
End: 2024-01-22
Attending: INTERNAL MEDICINE
Payer: COMMERCIAL

## 2024-01-22 ENCOUNTER — MYC REFILL (OUTPATIENT)
Dept: ONCOLOGY | Facility: CLINIC | Age: 29
End: 2024-01-22

## 2024-01-22 VITALS
DIASTOLIC BLOOD PRESSURE: 82 MMHG | SYSTOLIC BLOOD PRESSURE: 123 MMHG | TEMPERATURE: 97.7 F | RESPIRATION RATE: 16 BRPM | HEART RATE: 85 BPM | OXYGEN SATURATION: 96 % | BODY MASS INDEX: 30.54 KG/M2 | WEIGHT: 206.8 LBS

## 2024-01-22 DIAGNOSIS — C80.1 EMBRYONAL CARCINOMA (H): Primary | ICD-10-CM

## 2024-01-22 DIAGNOSIS — G89.3 CANCER ASSOCIATED PAIN: ICD-10-CM

## 2024-01-22 DIAGNOSIS — C80.1 EMBRYONAL CARCINOMA (H): ICD-10-CM

## 2024-01-22 LAB
ANION GAP SERPL CALCULATED.3IONS-SCNC: 11 MMOL/L (ref 7–15)
BASOPHILS # BLD AUTO: 0 10E3/UL (ref 0–0.2)
BASOPHILS NFR BLD AUTO: 1 %
BUN SERPL-MCNC: 7.7 MG/DL (ref 6–20)
CALCIUM SERPL-MCNC: 9 MG/DL (ref 8.6–10)
CHLORIDE SERPL-SCNC: 102 MMOL/L (ref 98–107)
CREAT SERPL-MCNC: 0.65 MG/DL (ref 0.67–1.17)
DEPRECATED HCO3 PLAS-SCNC: 26 MMOL/L (ref 22–29)
EGFRCR SERPLBLD CKD-EPI 2021: >90 ML/MIN/1.73M2
EOSINOPHIL # BLD AUTO: 0 10E3/UL (ref 0–0.7)
EOSINOPHIL NFR BLD AUTO: 0 %
ERYTHROCYTE [DISTWIDTH] IN BLOOD BY AUTOMATED COUNT: 16.2 % (ref 10–15)
GLUCOSE SERPL-MCNC: 129 MG/DL (ref 70–99)
HCT VFR BLD AUTO: 31.9 % (ref 40–53)
HGB BLD-MCNC: 10.9 G/DL (ref 13.3–17.7)
IMM GRANULOCYTES # BLD: 0 10E3/UL
IMM GRANULOCYTES NFR BLD: 0 %
LYMPHOCYTES # BLD AUTO: 0.8 10E3/UL (ref 0.8–5.3)
LYMPHOCYTES NFR BLD AUTO: 32 %
MCH RBC QN AUTO: 30.1 PG (ref 26.5–33)
MCHC RBC AUTO-ENTMCNC: 34.2 G/DL (ref 31.5–36.5)
MCV RBC AUTO: 88 FL (ref 78–100)
MONOCYTES # BLD AUTO: 0.3 10E3/UL (ref 0–1.3)
MONOCYTES NFR BLD AUTO: 14 %
NEUTROPHILS # BLD AUTO: 1.3 10E3/UL (ref 1.6–8.3)
NEUTROPHILS NFR BLD AUTO: 53 %
NRBC # BLD AUTO: 0 10E3/UL
NRBC BLD AUTO-RTO: 0 /100
PLATELET # BLD AUTO: 182 10E3/UL (ref 150–450)
POTASSIUM SERPL-SCNC: 4.7 MMOL/L (ref 3.4–5.3)
RBC # BLD AUTO: 3.62 10E6/UL (ref 4.4–5.9)
SODIUM SERPL-SCNC: 139 MMOL/L (ref 135–145)
WBC # BLD AUTO: 2.5 10E3/UL (ref 4–11)

## 2024-01-22 PROCEDURE — 96413 CHEMO IV INFUSION 1 HR: CPT

## 2024-01-22 PROCEDURE — 36591 DRAW BLOOD OFF VENOUS DEVICE: CPT

## 2024-01-22 PROCEDURE — 85004 AUTOMATED DIFF WBC COUNT: CPT | Performed by: INTERNAL MEDICINE

## 2024-01-22 PROCEDURE — 80048 BASIC METABOLIC PNL TOTAL CA: CPT | Performed by: NURSE PRACTITIONER

## 2024-01-22 PROCEDURE — 83735 ASSAY OF MAGNESIUM: CPT | Performed by: NURSE PRACTITIONER

## 2024-01-22 PROCEDURE — 36591 DRAW BLOOD OFF VENOUS DEVICE: CPT | Performed by: NURSE PRACTITIONER

## 2024-01-22 PROCEDURE — 258N000003 HC RX IP 258 OP 636: Performed by: NURSE PRACTITIONER

## 2024-01-22 PROCEDURE — 250N000011 HC RX IP 250 OP 636: Mod: JZ | Performed by: NURSE PRACTITIONER

## 2024-01-22 RX ORDER — HEPARIN SODIUM,PORCINE 10 UNIT/ML
5-20 VIAL (ML) INTRAVENOUS DAILY PRN
Status: CANCELLED | OUTPATIENT
Start: 2024-01-22

## 2024-01-22 RX ORDER — HEPARIN SODIUM (PORCINE) LOCK FLUSH IV SOLN 100 UNIT/ML 100 UNIT/ML
5 SOLUTION INTRAVENOUS
Status: DISCONTINUED | OUTPATIENT
Start: 2024-01-22 | End: 2024-01-22 | Stop reason: HOSPADM

## 2024-01-22 RX ORDER — HEPARIN SODIUM (PORCINE) LOCK FLUSH IV SOLN 100 UNIT/ML 100 UNIT/ML
5 SOLUTION INTRAVENOUS
Status: CANCELLED | OUTPATIENT
Start: 2024-01-22

## 2024-01-22 RX ORDER — LORAZEPAM 0.5 MG/1
.5-1 TABLET ORAL EVERY 6 HOURS PRN
Qty: 60 TABLET | Refills: 0 | Status: SHIPPED | OUTPATIENT
Start: 2024-01-22 | End: 2024-05-01

## 2024-01-22 RX ORDER — MORPHINE SULFATE 15 MG/1
15 TABLET, FILM COATED, EXTENDED RELEASE ORAL EVERY 12 HOURS
Qty: 60 TABLET | Refills: 0 | Status: SHIPPED | OUTPATIENT
Start: 2024-01-22 | End: 2024-02-20

## 2024-01-22 RX ADMIN — BLEOMYCIN SULFATE 30 UNITS: 30 INJECTION, POWDER, LYOPHILIZED, FOR SOLUTION INTRAMUSCULAR; INTRAPLEURAL; INTRAVENOUS; SUBCUTANEOUS at 14:06

## 2024-01-22 RX ADMIN — SODIUM CHLORIDE 250 ML: 9 INJECTION, SOLUTION INTRAVENOUS at 13:55

## 2024-01-22 RX ADMIN — Medication 5 ML: at 14:33

## 2024-01-22 NOTE — PROGRESS NOTES
Nursing Note:  Jayson Banegas presents today for Port Labs.    Patient seen by provider today: No   present during visit today: Not Applicable.    Note: N/A.    Intravenous Access:  Labs drawn without difficulty.  Implanted Port.    Discharge Plan:   Patient was sent to [facility] for infusion appointment.    Ana Alexandra RN

## 2024-01-22 NOTE — PROGRESS NOTES
Infusion Nursing Note:  Jayson Banegas presents today for C4D15 Bleomycin.    Patient seen by provider today: No   present during visit today: Not Applicable.    Note: Pt reports no new health changes or concerns at this time. It is pt's last dose of chemo today. Plans for scans next month, then appt with Dr. Wick on 2/7/24.      Intravenous Access:  Implanted Port.    Treatment Conditions:  Lab Results   Component Value Date    HGB 10.9 (L) 01/22/2024    WBC 2.5 (L) 01/22/2024    ANEU 0.9 (L) 01/08/2024    ANEUTAUTO 1.3 (L) 01/22/2024     01/22/2024        Lab Results   Component Value Date     01/22/2024    POTASSIUM 4.7 01/22/2024    MAG 1.8 01/15/2024    CR 0.65 (L) 01/22/2024    ALEX 9.0 01/22/2024    BILITOTAL 0.2 01/15/2024    ALBUMIN 4.1 01/15/2024    ALT 22 01/15/2024    AST 23 01/15/2024       Results reviewed, labs MET treatment parameters, ok to proceed with treatment.    Post Infusion Assessment:  Patient tolerated infusion without incident.  Blood return noted pre and post infusion.  Site patent and intact, free from redness, edema or discomfort.  No evidence of extravasations.  Access discontinued per protocol.     Discharge Plan:   Patient declined prescription refills.  Discharge instructions reviewed with: Patient.  Patient and/or family verbalized understanding of discharge instructions and all questions answered.  AVS to patient via [employer].  Patient will return 2/7/24 for next appointment.   Patient discharged in stable condition accompanied by: self.  Departure Mode: Ambulatory.      Teri Camejo RN

## 2024-01-23 LAB — MAGNESIUM SERPL-MCNC: 2 MG/DL (ref 1.7–2.3)

## 2024-01-30 NOTE — PROGRESS NOTES
[facility]    Hematology/Oncology Established Patient Note      Today's Date: 2/7/2024    Reason for visit: Embryonal carcinoma.    HISTORY OF PRESENT ILLNESS: Jayson Banegas is a 28 year old male with history of alcoholism and nicotine dependence in remission, depression, anxiety, and PTSD who presents with the following oncologic history:  1. 9/2023: Presented to PCP with left lower quadrant abdominal pain.  Given 2 weeks of antibiotics with no improvement.  2. 9/29/2023: CT abdomen/pelvis with contrast at [facility] showed new 10 mm noncalcified left lower lobe lung nodule; no effusion; diffuse mild/moderate abdominal/pelvic retroperitoneal lymphadenopathy, largest nodes 3.0 x 1.8 cm   retrocrural, 3.0 x 1.8 cm left periaortic, 2.5 x 2.3 cm left   periaortic, 4.2 x 2.2 cm left external iliac, 2.0 x 1.4 cm right internal iliac, 2.7 x 2.2 cm left internal iliac, and 2.8 x 1.7 cm  left inguinal. No right inguinal lymphadenopathy. Multiple small, mildly enlarged mesenteric root lymph nodes. Mild haziness mesenteric root and retroperitoneal fat. Right testicle now located in scrotum; left testicle not included in field of view; no bone lesions.  3. 9/29/2023: U/S of scrotum with Doppler showed no evidence for intratesticular mass; small benign right epididymal head cyst; small left varicocele. CBC normal except monocytes slightly elevated at 1.1; normal BMP; CRP slightly elevated at 1.04.  4. 10/02/2023: CT chest with contrast showed multiple indeterminate well-circumscribed solid pulmonary nodules in the right lung; 3 mm nodule anterior right upper lobe; 8 mm solid pulmonary nodule in right middle lobe; 6 mm nodule in lateral right lower lobe; additional 10 mm right lower lobe lung nodule; no infiltrates or effusions; unchanged upper abdominal lymphadenopathy.  5. 10/11/2023: CT-guided biopsy of retroperitoneal lymph node at [facility] showed embryonal carcinoma, cannot rule  out mixed type. Biopsy of 6 mm of right lung biopsy was negative for malignancy. LDH elevated at 1643. Beta-HCG = 856, AFP = 34. Biopsy specimen sent to [facility] for review of pathology.  6. 10/23/2023: Presented with chest pain.  CT chest at [facility] showed no PE. Multiple pulmonary nodules enlarged since 10/02/2023. Prior right lower lobe lung nodule 7 mm, now 13 mm, prior 8 mm right middle lobe nodule now 12 mm; prior 6 mm right lower lobe lung nodule now 12 mm; new 2 mm right lower lobe lung nodule; enlarging left upper quadrant mass arising from or engulfing left adrenal gland 2.2 x 4.6 cm; upper abdominal lymphadenopathy have enlarged.  7. 10/26/2023: PET/CT scan showed multiple hypermetabolic pulmonary nodules most likely reflecting metastatic disease. Abnormal hypermetabolic left retrocrural lymph node. Otherwise no abnormal adenopathy in the neck or chest. Diffuse hypermetabolic lymphadenopathy throughout the retroperitoneum, mesentery, and bilateral iliac chains left greater than right as well as in the left groin region.   8.  11/1/2023-1/22/2024: Completion of BEP - bleomycin, etoposide, cisplatin - x 4 cycles.  9. 2/5/2024: CT C/A/P showed mildly enlarged mesenteric and retroperitoneal lymph nodes with representative left periaortic lymph node measuring 1.1 cm, previously 1.2 cm; no new lymph nodes; new interstitial infiltrates throughout the right lung, involving the upper, middle, and lower lobes. Pulmonary nodules overall stable with a right lower lobe pulmonary nodule  near the diaphragm contains a punctate focus of calcification, and is unchanged in size at 12 mm; 3 mm left upper lobe pulmonary nodule is stable; no new pulmonary nodules; mildly prominent right hilar lymph nodes likely reactive.    INTERVAL HISTORY:  Delroy reports fatigue and right sided pleuritic pain as well as some dyspnea.  He reports having COVID in January for which he took Paxlovid.  Now, for past few days, he  has had this right sided pain in the lung area.  He has intermittent productive cough with lime green phlegm.    REVIEW OF SYSTEMS:   14 point ROS was reviewed and is negative other than as noted above in HPI.       HOME MEDICATIONS:  Current Outpatient Medications   Medication Sig Dispense Refill    acetaminophen (TYLENOL) 325 MG tablet Take 325-650 mg by mouth every 6 hours as needed for mild pain (Patient not taking: Reported on 1/9/2024)      HYDROmorphone (DILAUDID) 2 MG tablet Take 1-2 tablets (2-4 mg) by mouth every 4 hours as needed for moderate to severe pain (Take Dilaudid 2 mg every 4 hours as needed for moderate pain, 4 mg every 4 hours as needed for severe pain) 60 tablet 0    lidocaine-prilocaine (EMLA) 2.5-2.5 % external cream Apply topically to port site 30 min prior to port access 30 g 3    LORazepam (ATIVAN) 0.5 MG tablet Take 1-2 tablets (0.5-1 mg) by mouth every 6 hours as needed for anxiety, nausea or vomiting (Breakthrough Nausea / Vomiting) 60 tablet 0    medical cannabis (Patient's own supply) (The purpose of this order is to document that the patient reports taking medical cannabis.  This is not a prescription, and is not used to certify that the patient has a qualifying medical condition.)      morphine (MS CONTIN) 15 MG CR tablet Take 1 tablet (15 mg) by mouth every 12 hours 60 tablet 0    OLANZapine zydis (ZYPREXA) 5 MG ODT Take 1-2 tablets (5-10 mg) by mouth 2 times daily as needed for other (nausea, low appetite) 60 tablet 1    ondansetron (ZOFRAN) 8 MG tablet Take 1 tablet (8 mg) by mouth every 8 hours as needed for nausea (vomiting) 60 tablet 2    pantoprazole (PROTONIX) 40 MG EC tablet Take 1 tablet (40 mg) by mouth daily 90 tablet 3    polyethylene glycol (MIRALAX) 17 GM/Dose powder Take 17 g by mouth daily 510 g 0    prochlorperazine (COMPAZINE) 10 MG tablet Take 1 tablet (10 mg) by mouth every 6 hours as needed for nausea or vomiting 30 tablet 2    senna-docusate  "(SENOKOT-S/PERICOLACE) 8.6-50 MG tablet Take 3-4 tablets by mouth 2 times daily 100 tablet 0    sertraline (ZOLOFT) 100 MG tablet Take 1.5 tablets (150 mg) by mouth daily 45 tablet 2         ALLERGIES:  Allergies   Allergen Reactions    Sulfamethoxazole-Trimethoprim Rash         PAST MEDICAL HISTORY:  None.      PAST SURGICAL HISTORY:  [place] tooth removal.    SOCIAL HISTORY:  Social History     Socioeconomic History    Marital status: Single     Spouse name: Not on file    Number of children: Not on file    Years of education: Not on file    Highest education level: Not on file   Occupational History    Not on file   Tobacco Use    Smoking status: Former     Types: Cigarettes     Passive exposure: Never    Smokeless tobacco: Never   Substance and Sexual Activity    Alcohol use: Not on file    Drug use: Not on file    Sexual activity: Not on file   Other Topics Concern    Not on file   Social History Narrative    Not on file     Social Determinants of Health     Financial Resource Strain: Not on file   Food Insecurity: Not on file   Transportation Needs: Not on file   Physical Activity: Not on file   Stress: Not on file   Social Connections: Not on file   Interpersonal Safety: Not on file   Housing Stability: Not on file         FAMILY HISTORY:  BRCA-2 mutation on paternal side of family. Paternal great aunt with unknown type of cancer. Maternal grandparent with pancreatic cancer.    PHYSICAL EXAM:  Vital signs:  /80   Pulse 97   Resp 16   Ht 1.753 m (5' 9\")   Wt 96.2 kg (212 lb)   SpO2 96%   BMI 31.31 kg/m     ECO  GENERAL/CONSTITUTIONAL: No acute distress.  EYES: No erythema or scleral icterus.  ENT/MOUTH: Neck supple.  LYMPH: No cervical, supraclavicular, axillary, inguinal adenopathy.   RESPIRATORY: Clear to auscultation bilaterally. No crackles or wheezing.   CARDIOVASCULAR: Regular rate and rhythm without murmurs.  GASTROINTESTINAL: No hepatosplenomegaly, masses, or tenderness. No guarding.  No " distention.  MUSCULOSKELETAL: Warm and well-perfused, no cyanosis, clubbing, or edema.  NEUROLOGIC: No focal motor deficits. Alert, oriented, answers questions appropriately.  INTEGUMENTARY: No rashes or jaundice.  GAIT: Steady, does not use assistive device.       LABS:  CBC RESULTS:   Recent Labs   Lab Test 01/22/24  1317   WBC 2.5*   RBC 3.62*   HGB 10.9*   HCT 31.9*   MCV 88   MCH 30.1   MCHC 34.2   RDW 16.2*        Last Comprehensive Metabolic Panel:  Sodium   Date Value Ref Range Status   01/22/2024 139 135 - 145 mmol/L Final     Comment:     Reference intervals for this test were updated on 09/26/2023 to more accurately reflect our healthy population. There may be differences in the flagging of prior results with similar values performed with this method. Interpretation of those prior results can be made in the context of the updated reference intervals.      Potassium   Date Value Ref Range Status   01/22/2024 4.7 3.4 - 5.3 mmol/L Final     Chloride   Date Value Ref Range Status   01/22/2024 102 98 - 107 mmol/L Final     Carbon Dioxide (CO2)   Date Value Ref Range Status   01/22/2024 26 22 - 29 mmol/L Final     Anion Gap   Date Value Ref Range Status   01/22/2024 11 7 - 15 mmol/L Final     Glucose   Date Value Ref Range Status   01/22/2024 129 (H) 70 - 99 mg/dL Final     Urea Nitrogen   Date Value Ref Range Status   01/22/2024 7.7 6.0 - 20.0 mg/dL Final     Creatinine   Date Value Ref Range Status   01/22/2024 0.65 (L) 0.67 - 1.17 mg/dL Final     GFR Estimate   Date Value Ref Range Status   01/22/2024 >90 >60 mL/min/1.73m2 Final     Calcium   Date Value Ref Range Status   01/22/2024 9.0 8.6 - 10.0 mg/dL Final     Bilirubin Total   Date Value Ref Range Status   01/15/2024 0.2 <=1.2 mg/dL Final     Alkaline Phosphatase   Date Value Ref Range Status   01/15/2024 49 40 - 150 U/L Final     Comment:     Reference intervals for this test were updated on 11/14/2023 to more accurately reflect our healthy  population. There may be differences in the flagging of prior results with similar values performed with this method. Interpretation of those prior results can be made in the context of the updated reference intervals.     ALT   Date Value Ref Range Status   01/15/2024 22 0 - 70 U/L Final     Comment:     Reference intervals for this test were updated on 6/12/2023 to more accurately reflect our healthy population. There may be differences in the flagging of prior results with similar values performed with this method. Interpretation of those prior results can be made in the context of the updated reference intervals.       AST   Date Value Ref Range Status   01/15/2024 23 0 - 45 U/L Final     Comment:     Reference intervals for this test were updated on 6/12/2023 to more accurately reflect our healthy population. There may be differences in the flagging of prior results with similar values performed with this method. Interpretation of those prior results can be made in the context of the updated reference intervals.                  PATHOLOGY:  Reviewed as per HPI.    IMAGING:  Reviewed as per HPI.    ASSESSMENT/PLAN:  Jayson Banegas is a 28 year old male with the following issues:  1. Embryonal carcinoma, clinical stage IIIB, [id number], S2 (pre-orchiectomy), intermediate risk  2. Probable metastases to bilateral lungs  3. Diffuse abdominal, pelvic, and retroperitoneal lymphadenopathy with associated pain  --Although 10/11/2023 CT-guided biopsy of lung nodule negative for malignancy, interval chest CT from 10/23/2023 showed further enlargement of multiple solid lung nodules most consistent with lung metastases.   --10/11/2023 CT-guided biopsy of retroperitoneal lymph node confirmed an embryonal carcinoma, which is nonseminoma type.  --Stage III disease, overall prognosis with 5-year survival rate of 73%, all treatment with curative intent.  --He saw urology with Dr. Gilmore with no indication to do   orchiectomy prior to chemo as no lesions were seen on prior U/S of scrotum.    --Delroy completed sperm banking prior to chemo.  --Delroy completed 4 cycles of BEP on 1/22/2024.  -- I personally reviewed his 2/5/2024 CT C/A/P which showed mildly enlarged mesenteric and retroperitoneal lymph nodes with representative left periaortic lymph node measuring 1.1 cm, previously 1.2 cm; no new lymph nodes; new interstitial infiltrates throughout the right lung, involving the upper, middle, and lower lobes. Pulmonary nodules overall stable with a right lower lobe pulmonary nodule near the diaphragm contains a punctate focus of calcification, and is unchanged in size at 12 mm; 3 mm left upper lobe pulmonary nodule is stable; no new pulmonary nodules; mildly prominent right hilar lymph nodes likely reactive.  --If persistent disease with any lymph nodes > 2 cm, will have Delroy revisit Dr. Gilmore for RPLND/surgical resection.  This is not indicated give excellent results radiographically.  --Otherwise, surveillance post-chemo would be every 2 months year 1, every 4 months year 2, every 6 months years 3-5, annually after year 5 and check serum HCG and AFP, chest x-ray.  --Will draw for CBC, CMP and tumor markers today.  --Will arrange for IR port removal.    4. Dyspnea  --PFT's pending.    5. Tinnitus  --Intermittent.  --Already scheduled for audiology exam 2/29.    6. GERD  --Left upper quadrant and epigastric pain likely related to GERD.  --Continue pantoprazole.    7. Cancer-related pain  --Slowly wean down MSContin and Dilaudid as per pain specialist. Discussed taking senna while on opioids.    8. Right-sided pneumonia  --Delroy has right sided pleuritic pain associated with pneumonia and interstitial infiltrates seen in right lung on chest CT scan.  He is afebrile but has intermittent productive cough and mild dyspnea.  Has no hypoxia today.  --I advised starting Z-curtis -- prescription given.  --Discussed that if his respiratory  symptoms worsen he will need to see his local PCP or be seen at his local urgent care, or, if severe, to be seen at nearest ED.    Return in 2 months.    Heather Wick MD  Hematology/Oncology  [facility]    Total time spent today: 44 minutes in chart review, patient evaluation, counseling, documentation, test and/or medication/prescription orders, and extensive coordination of care.

## 2024-02-01 ENCOUNTER — TELEPHONE (OUTPATIENT)
Dept: MEDSURG UNIT | Facility: CLINIC | Age: 29
End: 2024-02-01
Payer: COMMERCIAL

## 2024-02-05 ENCOUNTER — HOSPITAL ENCOUNTER (OUTPATIENT)
Dept: CT IMAGING | Facility: CLINIC | Age: 29
Discharge: HOME | End: 2024-02-05
Attending: INTERNAL MEDICINE | Admitting: INTERNAL MEDICINE
Payer: COMMERCIAL

## 2024-02-05 ENCOUNTER — NURSE TRIAGE (OUTPATIENT)
Dept: NURSING | Facility: CLINIC | Age: 29
End: 2024-02-05

## 2024-02-05 ENCOUNTER — HOSPITAL ENCOUNTER (OUTPATIENT)
Facility: CLINIC | Age: 29
Discharge: HOME | End: 2024-02-05
Admitting: INTERNAL MEDICINE
Payer: COMMERCIAL

## 2024-02-05 DIAGNOSIS — C80.1 EMBRYONAL CARCINOMA (H): ICD-10-CM

## 2024-02-05 DIAGNOSIS — C77.8 MALIGNANT NEOPLASM METASTATIC TO LYMPH NODES OF MULTIPLE SITES (H): ICD-10-CM

## 2024-02-05 DIAGNOSIS — C80.1 EMBRYONAL CARCINOMA (H): Primary | ICD-10-CM

## 2024-02-05 DIAGNOSIS — C78.02 MALIGNANT NEOPLASM METASTATIC TO BOTH LUNGS (H): ICD-10-CM

## 2024-02-05 DIAGNOSIS — C78.01 MALIGNANT NEOPLASM METASTATIC TO BOTH LUNGS (H): ICD-10-CM

## 2024-02-05 PROCEDURE — 999N000154 HC STATISTIC RADIOLOGY XRAY, US, CT, MAR, NM

## 2024-02-05 PROCEDURE — 250N000011 HC RX IP 250 OP 636: Performed by: INTERNAL MEDICINE

## 2024-02-05 PROCEDURE — 71260 CT THORAX DX C+: CPT

## 2024-02-05 PROCEDURE — 250N000009 HC RX 250: Performed by: INTERNAL MEDICINE

## 2024-02-05 RX ORDER — HEPARIN SODIUM,PORCINE 10 UNIT/ML
5-20 VIAL (ML) INTRAVENOUS DAILY PRN
OUTPATIENT
Start: 2024-02-05

## 2024-02-05 RX ORDER — HEPARIN SODIUM,PORCINE 10 UNIT/ML
5-20 VIAL (ML) INTRAVENOUS DAILY PRN
Status: DISCONTINUED | OUTPATIENT
Start: 2024-02-05 | End: 2024-02-05 | Stop reason: HOSPADM

## 2024-02-05 RX ORDER — HEPARIN SODIUM (PORCINE) LOCK FLUSH IV SOLN 100 UNIT/ML 100 UNIT/ML
5 SOLUTION INTRAVENOUS
Status: DISCONTINUED | OUTPATIENT
Start: 2024-02-05 | End: 2024-02-05 | Stop reason: HOSPADM

## 2024-02-05 RX ORDER — HEPARIN SODIUM (PORCINE) LOCK FLUSH IV SOLN 100 UNIT/ML 100 UNIT/ML
5 SOLUTION INTRAVENOUS
OUTPATIENT
Start: 2024-02-05

## 2024-02-05 RX ORDER — IOPAMIDOL 755 MG/ML
101 INJECTION, SOLUTION INTRAVASCULAR ONCE
Status: COMPLETED | OUTPATIENT
Start: 2024-02-05 | End: 2024-02-05

## 2024-02-05 RX ADMIN — IOPAMIDOL 101 ML: 755 INJECTION, SOLUTION INTRAVENOUS at 12:44

## 2024-02-05 RX ADMIN — HEPARIN SODIUM (PORCINE) LOCK FLUSH IV SOLN 100 UNIT/ML 5 ML: 100 SOLUTION at 12:52

## 2024-02-05 RX ADMIN — SODIUM CHLORIDE 69 ML: 9 INJECTION, SOLUTION INTRAVENOUS at 12:45

## 2024-02-05 ASSESSMENT — ACTIVITIES OF DAILY LIVING (ADL): ADLS_ACUITY_SCORE: 35

## 2024-02-05 NOTE — PROGRESS NOTES
Power port access without difficulties. Delroy tolerated it well.    1256 Power port de-accessed without difficulties. Delroy tolerated it well.

## 2024-02-05 NOTE — TELEPHONE ENCOUNTER
"Call received from motherDeb.  Call changed to speaking directly with pt.  Later in the call, Delroy gives consent to speak with mother.    Delroy reports:  - Worsening SOB - SOB for a couple weeks.  - Hard to take a deep breath   - Hurts to breathe  - Chest feels tight - consistent  - Lightheaded    Temp = 97.6    Today: CT CHEST/ABDOMEN/PELVIS WITH CONTRAST 2/5/2024 1:00 PM   IMPRESSION:  1.  Lymphadenopathy in the abdomen and pelvis is unchanged. Pulmonary  nodules are unchanged.  2.  New interstitial infiltrates throughout the right lung, most  likely infectious.     JUAN SUÁREZ MD     ER advised    Aubrie Whitney RN  [facility]      Reason for Disposition   Patient sounds very sick or weak to the triager    Additional Information   Negative: SEVERE difficulty breathing (e.g., struggling for each breath, speaks in single words)   Negative: [1] Breathing stopped AND [2] hasn't returned   Negative: Choking on something   Negative: Bluish (or gray) lips or face now   Negative: Difficult to awaken or acting confused (e.g., disoriented, slurred speech)   Negative: Passed out (i.e., lost consciousness, collapsed and was not responding)   Negative: Wheezing started suddenly after medicine, an allergic food or bee sting   Negative: Stridor (harsh sound while breathing in)   Negative: Slow, shallow and weak breathing   Negative: Sounds like a life-threatening emergency to the triager   Negative: [1] MODERATE difficulty breathing (e.g., speaks in phrases, SOB even at rest, pulse 100-120) AND [2] NEW-onset or WORSE than normal   Negative: [1] Neutropenia known or suspected (e.g., recent cancer chemotherapy) AND [2] fever > 100.4 F (38.0 C)   Negative: Oxygen level (e.g., pulse oximetry) 90 percent or lower   Negative: Wheezing can be heard across the room   Negative: Drooling or spitting out saliva (because can't swallow)   Negative: History of prior \"blood clot\" in leg or lungs (i.e., deep " "vein thrombosis, pulmonary embolism)   Negative: History of inherited increased risk of blood clots (e.g., Factor 5 Leiden, Anti-thrombin 3, Protein C or Protein S deficiency, Prothrombin mutation)   Negative: Major surgery in the past month   Negative: Hip or leg fracture (broken bone) in past month (or had cast on leg or ankle in past month)   Negative: Illness requiring prolonged bedrest in past month (e.g., immobilization, long hospital stay)   Negative: Long-distance travel in past month (e.g., car, bus, train, plane; with trip lasting 6 or more hours)   Negative: Extra heartbeats, irregular heart beating, or heart is beating very fast  (i.e., \"palpitations\")   Negative: Fever > 103 F (39.4 C)   Negative: [1] Fever > 101 F (38.3 C) AND [2] age > 60 years   Negative: [1] Fever > 100.0 F (37.8 C) AND [2] diabetes mellitus or weak immune system (e.g., HIV positive, cancer chemo, splenectomy, organ transplant, chronic steroids)    Protocols used: Cancer - Breathing Difficulty-A-AH    "

## 2024-02-07 ENCOUNTER — ONCOLOGY VISIT (OUTPATIENT)
Dept: ONCOLOGY | Facility: CLINIC | Age: 29
End: 2024-02-07
Attending: INTERNAL MEDICINE
Payer: COMMERCIAL

## 2024-02-07 VITALS
WEIGHT: 212 LBS | HEART RATE: 97 BPM | RESPIRATION RATE: 16 BRPM | SYSTOLIC BLOOD PRESSURE: 120 MMHG | HEIGHT: 69 IN | BODY MASS INDEX: 31.4 KG/M2 | OXYGEN SATURATION: 96 % | DIASTOLIC BLOOD PRESSURE: 80 MMHG

## 2024-02-07 DIAGNOSIS — J18.9 PNEUMONIA OF RIGHT LUNG DUE TO INFECTIOUS ORGANISM, UNSPECIFIED PART OF LUNG: ICD-10-CM

## 2024-02-07 DIAGNOSIS — C80.1 EMBRYONAL CARCINOMA (H): Primary | ICD-10-CM

## 2024-02-07 DIAGNOSIS — G89.3 CANCER ASSOCIATED PAIN: ICD-10-CM

## 2024-02-07 LAB
ALBUMIN SERPL BCG-MCNC: 4.1 G/DL (ref 3.5–5.2)
ALP SERPL-CCNC: 52 U/L (ref 40–150)
ALT SERPL W P-5'-P-CCNC: 26 U/L (ref 0–70)
ANION GAP SERPL CALCULATED.3IONS-SCNC: 8 MMOL/L (ref 7–15)
AST SERPL W P-5'-P-CCNC: 23 U/L (ref 0–45)
BASOPHILS # BLD AUTO: 0.1 10E3/UL (ref 0–0.2)
BASOPHILS NFR BLD AUTO: 1 %
BILIRUB SERPL-MCNC: 0.2 MG/DL
BUN SERPL-MCNC: 10.5 MG/DL (ref 6–20)
CALCIUM SERPL-MCNC: 9.2 MG/DL (ref 8.6–10)
CHLORIDE SERPL-SCNC: 104 MMOL/L (ref 98–107)
CREAT SERPL-MCNC: 0.83 MG/DL (ref 0.67–1.17)
DEPRECATED HCO3 PLAS-SCNC: 28 MMOL/L (ref 22–29)
EGFRCR SERPLBLD CKD-EPI 2021: >90 ML/MIN/1.73M2
EOSINOPHIL # BLD AUTO: 0.2 10E3/UL (ref 0–0.7)
EOSINOPHIL NFR BLD AUTO: 3 %
ERYTHROCYTE [DISTWIDTH] IN BLOOD BY AUTOMATED COUNT: 16.6 % (ref 10–15)
GLUCOSE SERPL-MCNC: 99 MG/DL (ref 70–99)
HCT VFR BLD AUTO: 34.4 % (ref 40–53)
HGB BLD-MCNC: 11.6 G/DL (ref 13.3–17.7)
IMM GRANULOCYTES # BLD: 0.1 10E3/UL
IMM GRANULOCYTES NFR BLD: 1 %
LYMPHOCYTES # BLD AUTO: 1.5 10E3/UL (ref 0.8–5.3)
LYMPHOCYTES NFR BLD AUTO: 25 %
MAGNESIUM SERPL-MCNC: 1.9 MG/DL (ref 1.7–2.3)
MCH RBC QN AUTO: 30.4 PG (ref 26.5–33)
MCHC RBC AUTO-ENTMCNC: 33.7 G/DL (ref 31.5–36.5)
MCV RBC AUTO: 90 FL (ref 78–100)
MONOCYTES # BLD AUTO: 1 10E3/UL (ref 0–1.3)
MONOCYTES NFR BLD AUTO: 17 %
NEUTROPHILS # BLD AUTO: 3.2 10E3/UL (ref 1.6–8.3)
NEUTROPHILS NFR BLD AUTO: 53 %
NRBC # BLD AUTO: 0 10E3/UL
NRBC BLD AUTO-RTO: 0 /100
PLATELET # BLD AUTO: 387 10E3/UL (ref 150–450)
POTASSIUM SERPL-SCNC: 4.7 MMOL/L (ref 3.4–5.3)
PROT SERPL-MCNC: 6.8 G/DL (ref 6.4–8.3)
RBC # BLD AUTO: 3.82 10E6/UL (ref 4.4–5.9)
SODIUM SERPL-SCNC: 140 MMOL/L (ref 135–145)
WBC # BLD AUTO: 6 10E3/UL (ref 4–11)

## 2024-02-07 PROCEDURE — 82105 ALPHA-FETOPROTEIN SERUM: CPT | Performed by: INTERNAL MEDICINE

## 2024-02-07 PROCEDURE — 83735 ASSAY OF MAGNESIUM: CPT | Performed by: INTERNAL MEDICINE

## 2024-02-07 PROCEDURE — 99213 OFFICE O/P EST LOW 20 MIN: CPT | Performed by: INTERNAL MEDICINE

## 2024-02-07 PROCEDURE — 84702 CHORIONIC GONADOTROPIN TEST: CPT | Performed by: INTERNAL MEDICINE

## 2024-02-07 PROCEDURE — 80053 COMPREHEN METABOLIC PANEL: CPT | Performed by: INTERNAL MEDICINE

## 2024-02-07 PROCEDURE — 36415 COLL VENOUS BLD VENIPUNCTURE: CPT | Performed by: INTERNAL MEDICINE

## 2024-02-07 PROCEDURE — 85025 COMPLETE CBC W/AUTO DIFF WBC: CPT | Performed by: INTERNAL MEDICINE

## 2024-02-07 PROCEDURE — 99215 OFFICE O/P EST HI 40 MIN: CPT | Performed by: INTERNAL MEDICINE

## 2024-02-07 RX ORDER — AZITHROMYCIN 250 MG/1
TABLET, FILM COATED ORAL
Qty: 6 TABLET | Refills: 0 | Status: SHIPPED | OUTPATIENT
Start: 2024-02-07 | End: 2024-02-12

## 2024-02-07 ASSESSMENT — PAIN SCALES - GENERAL: PAINLEVEL: MODERATE PAIN (5)

## 2024-02-07 NOTE — LETTER
2/7/2024         RE: Jayson Banegas  [address]  [address]  [facility] [address]        Dear Colleague,    Thank you for referring your patient, Jayson Banegas, to the [facility]. Please see a copy of my visit note below.    [facility]    Hematology/Oncology Established Patient Note      Today's Date: 2/7/2024    Reason for visit: Embryonal carcinoma.    HISTORY OF PRESENT ILLNESS: Jayson Banegas is a 28 year old male with history of alcoholism and nicotine dependence in remission, depression, anxiety, and PTSD who presents with the following oncologic history:  1. 9/2023: Presented to PCP with left lower quadrant abdominal pain.  Given 2 weeks of antibiotics with no improvement.  2. 9/29/2023: CT abdomen/pelvis with contrast at [facility] showed new 10 mm noncalcified left lower lobe lung nodule; no effusion; diffuse mild/moderate abdominal/pelvic retroperitoneal lymphadenopathy, largest nodes 3.0 x 1.8 cm   retrocrural, 3.0 x 1.8 cm left periaortic, 2.5 x 2.3 cm left   periaortic, 4.2 x 2.2 cm left external iliac, 2.0 x 1.4 cm right internal iliac, 2.7 x 2.2 cm left internal iliac, and 2.8 x 1.7 cm  left inguinal. No right inguinal lymphadenopathy. Multiple small, mildly enlarged mesenteric root lymph nodes. Mild haziness mesenteric root and retroperitoneal fat. Right testicle now located in scrotum; left testicle not included in field of view; no bone lesions.  3. 9/29/2023: U/S of scrotum with Doppler showed no evidence for intratesticular mass; small benign right epididymal head cyst; small left varicocele. CBC normal except monocytes slightly elevated at 1.1; normal BMP; CRP slightly elevated at 1.04.  4. 10/02/2023: CT chest with contrast showed multiple indeterminate well-circumscribed solid pulmonary nodules in the right lung; 3 mm nodule anterior right upper lobe; 8 mm solid pulmonary nodule in right middle lobe; 6 mm nodule  in lateral right lower lobe; additional 10 mm right lower lobe lung nodule; no infiltrates or effusions; unchanged upper abdominal lymphadenopathy.  5. 10/11/2023: CT-guided biopsy of retroperitoneal lymph node at [facility] showed embryonal carcinoma, cannot rule out mixed type. Biopsy of 6 mm of right lung biopsy was negative for malignancy. LDH elevated at 1643. Beta-HCG = 856, AFP = 34. Biopsy specimen sent to [facility] for review of pathology.  6. 10/23/2023: Presented with chest pain.  CT chest at [facility] showed no PE. Multiple pulmonary nodules enlarged since 10/02/2023. Prior right lower lobe lung nodule 7 mm, now 13 mm, prior 8 mm right middle lobe nodule now 12 mm; prior 6 mm right lower lobe lung nodule now 12 mm; new 2 mm right lower lobe lung nodule; enlarging left upper quadrant mass arising from or engulfing left adrenal gland 2.2 x 4.6 cm; upper abdominal lymphadenopathy have enlarged.  7. 10/26/2023: PET/CT scan showed multiple hypermetabolic pulmonary nodules most likely reflecting metastatic disease. Abnormal hypermetabolic left retrocrural lymph node. Otherwise no abnormal adenopathy in the neck or chest. Diffuse hypermetabolic lymphadenopathy throughout the retroperitoneum, mesentery, and bilateral iliac chains left greater than right as well as in the left groin region.   8.  11/1/2023-1/22/2024: Completion of BEP - bleomycin, etoposide, cisplatin - x 4 cycles.  9. 2/5/2024: CT C/A/P showed mildly enlarged mesenteric and retroperitoneal lymph nodes with representative left periaortic lymph node measuring 1.1 cm, previously 1.2 cm; no new lymph nodes; new interstitial infiltrates throughout the right lung, involving the upper, middle, and lower lobes. Pulmonary nodules overall stable with a right lower lobe pulmonary nodule  near the diaphragm contains a punctate focus of calcification, and is unchanged in size at 12 mm; 3 mm left upper lobe pulmonary nodule is stable; no new  pulmonary nodules; mildly prominent right hilar lymph nodes likely reactive.    INTERVAL HISTORY:  Delroy reports fatigue and right sided pleuritic pain as well as some dyspnea.  He reports having COVID in January for which he took Paxlovid.  Now, for past few days, he has had this right sided pain in the lung area.  He has intermittent productive cough with lime green phlegm.    REVIEW OF SYSTEMS:   14 point ROS was reviewed and is negative other than as noted above in HPI.       HOME MEDICATIONS:  Current Outpatient Medications   Medication Sig Dispense Refill     acetaminophen (TYLENOL) 325 MG tablet Take 325-650 mg by mouth every 6 hours as needed for mild pain (Patient not taking: Reported on 1/9/2024)       HYDROmorphone (DILAUDID) 2 MG tablet Take 1-2 tablets (2-4 mg) by mouth every 4 hours as needed for moderate to severe pain (Take Dilaudid 2 mg every 4 hours as needed for moderate pain, 4 mg every 4 hours as needed for severe pain) 60 tablet 0     lidocaine-prilocaine (EMLA) 2.5-2.5 % external cream Apply topically to port site 30 min prior to port access 30 g 3     LORazepam (ATIVAN) 0.5 MG tablet Take 1-2 tablets (0.5-1 mg) by mouth every 6 hours as needed for anxiety, nausea or vomiting (Breakthrough Nausea / Vomiting) 60 tablet 0     medical cannabis (Patient's own supply) (The purpose of this order is to document that the patient reports taking medical cannabis.  This is not a prescription, and is not used to certify that the patient has a qualifying medical condition.)       morphine (MS CONTIN) 15 MG CR tablet Take 1 tablet (15 mg) by mouth every 12 hours 60 tablet 0     OLANZapine zydis (ZYPREXA) 5 MG ODT Take 1-2 tablets (5-10 mg) by mouth 2 times daily as needed for other (nausea, low appetite) 60 tablet 1     ondansetron (ZOFRAN) 8 MG tablet Take 1 tablet (8 mg) by mouth every 8 hours as needed for nausea (vomiting) 60 tablet 2     pantoprazole (PROTONIX) 40 MG EC tablet Take 1 tablet (40 mg) by  "mouth daily 90 tablet 3     polyethylene glycol (MIRALAX) 17 GM/Dose powder Take 17 g by mouth daily 510 g 0     prochlorperazine (COMPAZINE) 10 MG tablet Take 1 tablet (10 mg) by mouth every 6 hours as needed for nausea or vomiting 30 tablet 2     senna-docusate (SENOKOT-S/PERICOLACE) 8.6-50 MG tablet Take 3-4 tablets by mouth 2 times daily 100 tablet 0     sertraline (ZOLOFT) 100 MG tablet Take 1.5 tablets (150 mg) by mouth daily 45 tablet 2         ALLERGIES:  Allergies   Allergen Reactions     Sulfamethoxazole-Trimethoprim Rash         PAST MEDICAL HISTORY:  None.      PAST SURGICAL HISTORY:  [place] tooth removal.    SOCIAL HISTORY:  Social History     Socioeconomic History     Marital status: Single     Spouse name: Not on file     Number of children: Not on file     Years of education: Not on file     Highest education level: Not on file   Occupational History     Not on file   Tobacco Use     Smoking status: Former     Types: Cigarettes     Passive exposure: Never     Smokeless tobacco: Never   Substance and Sexual Activity     Alcohol use: Not on file     Drug use: Not on file     Sexual activity: Not on file   Other Topics Concern     Not on file   Social History Narrative     Not on file     Social Determinants of Health     Financial Resource Strain: Not on file   Food Insecurity: Not on file   Transportation Needs: Not on file   Physical Activity: Not on file   Stress: Not on file   Social Connections: Not on file   Interpersonal Safety: Not on file   Housing Stability: Not on file         FAMILY HISTORY:  BRCA-2 mutation on paternal side of family. Paternal great aunt with unknown type of cancer. Maternal grandparent with pancreatic cancer.    PHYSICAL EXAM:  Vital signs:  /80   Pulse 97   Resp 16   Ht 1.753 m (5' 9\")   Wt 96.2 kg (212 lb)   SpO2 96%   BMI 31.31 kg/m     ECO  GENERAL/CONSTITUTIONAL: No acute distress.  EYES: No erythema or scleral icterus.  ENT/MOUTH: Neck " supple.  LYMPH: No cervical, supraclavicular, axillary, inguinal adenopathy.   RESPIRATORY: Clear to auscultation bilaterally. No crackles or wheezing.   CARDIOVASCULAR: Regular rate and rhythm without murmurs.  GASTROINTESTINAL: No hepatosplenomegaly, masses, or tenderness. No guarding.  No distention.  MUSCULOSKELETAL: Warm and well-perfused, no cyanosis, clubbing, or edema.  NEUROLOGIC: No focal motor deficits. Alert, oriented, answers questions appropriately.  INTEGUMENTARY: No rashes or jaundice.  GAIT: Steady, does not use assistive device.       LABS:  CBC RESULTS:   Recent Labs   Lab Test 01/22/24  1317   WBC 2.5*   RBC 3.62*   HGB 10.9*   HCT 31.9*   MCV 88   MCH 30.1   MCHC 34.2   RDW 16.2*        Last Comprehensive Metabolic Panel:  Sodium   Date Value Ref Range Status   01/22/2024 139 135 - 145 mmol/L Final     Comment:     Reference intervals for this test were updated on 09/26/2023 to more accurately reflect our healthy population. There may be differences in the flagging of prior results with similar values performed with this method. Interpretation of those prior results can be made in the context of the updated reference intervals.      Potassium   Date Value Ref Range Status   01/22/2024 4.7 3.4 - 5.3 mmol/L Final     Chloride   Date Value Ref Range Status   01/22/2024 102 98 - 107 mmol/L Final     Carbon Dioxide (CO2)   Date Value Ref Range Status   01/22/2024 26 22 - 29 mmol/L Final     Anion Gap   Date Value Ref Range Status   01/22/2024 11 7 - 15 mmol/L Final     Glucose   Date Value Ref Range Status   01/22/2024 129 (H) 70 - 99 mg/dL Final     Urea Nitrogen   Date Value Ref Range Status   01/22/2024 7.7 6.0 - 20.0 mg/dL Final     Creatinine   Date Value Ref Range Status   01/22/2024 0.65 (L) 0.67 - 1.17 mg/dL Final     GFR Estimate   Date Value Ref Range Status   01/22/2024 >90 >60 mL/min/1.73m2 Final     Calcium   Date Value Ref Range Status   01/22/2024 9.0 8.6 - 10.0 mg/dL Final      Bilirubin Total   Date Value Ref Range Status   01/15/2024 0.2 <=1.2 mg/dL Final     Alkaline Phosphatase   Date Value Ref Range Status   01/15/2024 49 40 - 150 U/L Final     Comment:     Reference intervals for this test were updated on 11/14/2023 to more accurately reflect our healthy population. There may be differences in the flagging of prior results with similar values performed with this method. Interpretation of those prior results can be made in the context of the updated reference intervals.     ALT   Date Value Ref Range Status   01/15/2024 22 0 - 70 U/L Final     Comment:     Reference intervals for this test were updated on 6/12/2023 to more accurately reflect our healthy population. There may be differences in the flagging of prior results with similar values performed with this method. Interpretation of those prior results can be made in the context of the updated reference intervals.       AST   Date Value Ref Range Status   01/15/2024 23 0 - 45 U/L Final     Comment:     Reference intervals for this test were updated on 6/12/2023 to more accurately reflect our healthy population. There may be differences in the flagging of prior results with similar values performed with this method. Interpretation of those prior results can be made in the context of the updated reference intervals.                  PATHOLOGY:  Reviewed as per HPI.    IMAGING:  Reviewed as per HPI.    ASSESSMENT/PLAN:  Jayson Banegas is a 28 year old male with the following issues:  1. Embryonal carcinoma, clinical stage IIIB, [id number], S2 (pre-orchiectomy), intermediate risk  2. Probable metastases to bilateral lungs  3. Diffuse abdominal, pelvic, and retroperitoneal lymphadenopathy with associated pain  --Although 10/11/2023 CT-guided biopsy of lung nodule negative for malignancy, interval chest CT from 10/23/2023 showed further enlargement of multiple solid lung nodules most consistent with lung metastases.    --10/11/2023 CT-guided biopsy of retroperitoneal lymph node confirmed an embryonal carcinoma, which is nonseminoma type.  --Stage III disease, overall prognosis with 5-year survival rate of 73%, all treatment with curative intent.  --He saw urology with Dr. Gilmore with no indication to do  orchiectomy prior to chemo as no lesions were seen on prior U/S of scrotum.    --Delroy completed sperm banking prior to chemo.  --Delroy completed 4 cycles of BEP on 1/22/2024.  -- I personally reviewed his 2/5/2024 CT C/A/P which showed mildly enlarged mesenteric and retroperitoneal lymph nodes with representative left periaortic lymph node measuring 1.1 cm, previously 1.2 cm; no new lymph nodes; new interstitial infiltrates throughout the right lung, involving the upper, middle, and lower lobes. Pulmonary nodules overall stable with a right lower lobe pulmonary nodule near the diaphragm contains a punctate focus of calcification, and is unchanged in size at 12 mm; 3 mm left upper lobe pulmonary nodule is stable; no new pulmonary nodules; mildly prominent right hilar lymph nodes likely reactive.  --If persistent disease with any lymph nodes > 2 cm, will have Delroy revisit Dr. Gilmore for RPLND/surgical resection.  This is not indicated give excellent results radiographically.  --Otherwise, surveillance post-chemo would be every 2 months year 1, every 4 months year 2, every 6 months years 3-5, annually after year 5 and check serum HCG and AFP, chest x-ray.  --Will draw for CBC, CMP and tumor markers today.  --Will arrange for IR port removal.    4. Dyspnea  --PFT's pending.    5. Tinnitus  --Intermittent.  --Already scheduled for audiology exam 2/29.    6. GERD  --Left upper quadrant and epigastric pain likely related to GERD.  --Continue pantoprazole.    7. Cancer-related pain  --Slowly wean down MSContin and Dilaudid as per pain specialist. Discussed taking senna while on opioids.    8. Right-sided pneumonia  --Delroy has right sided  pleuritic pain associated with pneumonia and interstitial infiltrates seen in right lung on chest CT scan.  He is afebrile but has intermittent productive cough and mild dyspnea.  Has no hypoxia today.  --I advised starting Z-curtis -- prescription given.  --Discussed that if his respiratory symptoms worsen he will need to see his local PCP or be seen at his local urgent care, or, if severe, to be seen at nearest ED.    Return in 2 months.    Heather Wick MD  Hematology/Oncology  [facility]    Total time spent today: 44 minutes in chart review, patient evaluation, counseling, documentation, test and/or medication/prescription orders, and extensive coordination of care.        Again, thank you for allowing me to participate in the care of your patient.        Sincerely,        Heather Wick MD

## 2024-02-07 NOTE — PATIENT INSTRUCTIONS
Lab draw today.  Arrange for IR port removal.  RTC MD in 2 months with labs drawn locally at least 3-5 days prior to visit.

## 2024-02-08 LAB
AFP SERPL-MCNC: 2.9 NG/ML
HCG-TM SERPL-ACNC: <3 IU/L

## 2024-02-09 ENCOUNTER — MYC MEDICAL ADVICE (OUTPATIENT)
Dept: INTERVENTIONAL RADIOLOGY/VASCULAR | Facility: CLINIC | Age: 29
End: 2024-02-09
Payer: COMMERCIAL

## 2024-02-12 ENCOUNTER — HOSPITAL ENCOUNTER (OUTPATIENT)
Facility: CLINIC | Age: 29
Discharge: HOME | End: 2024-02-12
Admitting: RADIOLOGY
Payer: COMMERCIAL

## 2024-02-12 ENCOUNTER — APPOINTMENT (OUTPATIENT)
Dept: INTERVENTIONAL RADIOLOGY/VASCULAR | Facility: CLINIC | Age: 29
End: 2024-02-12
Attending: INTERNAL MEDICINE
Payer: COMMERCIAL

## 2024-02-12 VITALS
WEIGHT: 208 LBS | BODY MASS INDEX: 30.81 KG/M2 | HEIGHT: 69 IN | OXYGEN SATURATION: 98 % | SYSTOLIC BLOOD PRESSURE: 120 MMHG | DIASTOLIC BLOOD PRESSURE: 63 MMHG | RESPIRATION RATE: 18 BRPM | TEMPERATURE: 97.8 F | HEART RATE: 62 BPM

## 2024-02-12 DIAGNOSIS — C80.1 EMBRYONAL CARCINOMA (H): ICD-10-CM

## 2024-02-12 PROCEDURE — 99152 MOD SED SAME PHYS/QHP 5/>YRS: CPT

## 2024-02-12 PROCEDURE — 258N000003 HC RX IP 258 OP 636: Performed by: PHYSICIAN ASSISTANT

## 2024-02-12 PROCEDURE — 250N000011 HC RX IP 250 OP 636: Performed by: PHYSICIAN ASSISTANT

## 2024-02-12 PROCEDURE — 999N000163 HC STATISTIC SIMPLE TUBE INSERTION/CHARGE, PORT, CATH, FISTULOGRAM

## 2024-02-12 PROCEDURE — 250N000009 HC RX 250: Performed by: PHYSICIAN ASSISTANT

## 2024-02-12 PROCEDURE — 96361 HYDRATE IV INFUSION ADD-ON: CPT

## 2024-02-12 RX ORDER — NALOXONE HYDROCHLORIDE 0.4 MG/ML
0.2 INJECTION, SOLUTION INTRAMUSCULAR; INTRAVENOUS; SUBCUTANEOUS
Status: DISCONTINUED | OUTPATIENT
Start: 2024-02-12 | End: 2024-02-12 | Stop reason: HOSPADM

## 2024-02-12 RX ORDER — FLUMAZENIL 0.1 MG/ML
0.2 INJECTION, SOLUTION INTRAVENOUS
Status: DISCONTINUED | OUTPATIENT
Start: 2024-02-12 | End: 2024-02-12 | Stop reason: HOSPADM

## 2024-02-12 RX ORDER — NALOXONE HYDROCHLORIDE 0.4 MG/ML
0.4 INJECTION, SOLUTION INTRAMUSCULAR; INTRAVENOUS; SUBCUTANEOUS
Status: DISCONTINUED | OUTPATIENT
Start: 2024-02-12 | End: 2024-02-12 | Stop reason: HOSPADM

## 2024-02-12 RX ORDER — FENTANYL CITRATE 50 UG/ML
25-50 INJECTION, SOLUTION INTRAMUSCULAR; INTRAVENOUS EVERY 5 MIN PRN
Status: DISCONTINUED | OUTPATIENT
Start: 2024-02-12 | End: 2024-02-12 | Stop reason: HOSPADM

## 2024-02-12 RX ORDER — SODIUM CHLORIDE 9 MG/ML
INJECTION, SOLUTION INTRAVENOUS CONTINUOUS
Status: DISCONTINUED | OUTPATIENT
Start: 2024-02-12 | End: 2024-02-12 | Stop reason: HOSPADM

## 2024-02-12 RX ADMIN — FENTANYL CITRATE 50 MCG: 50 INJECTION, SOLUTION INTRAMUSCULAR; INTRAVENOUS at 11:18

## 2024-02-12 RX ADMIN — MIDAZOLAM 1 MG: 1 INJECTION INTRAMUSCULAR; INTRAVENOUS at 11:01

## 2024-02-12 RX ADMIN — LIDOCAINE HYDROCHLORIDE 10 ML: 10 INJECTION, SOLUTION INFILTRATION; PERINEURAL at 11:15

## 2024-02-12 RX ADMIN — FENTANYL CITRATE 50 MCG: 50 INJECTION, SOLUTION INTRAMUSCULAR; INTRAVENOUS at 11:15

## 2024-02-12 RX ADMIN — SODIUM CHLORIDE: 9 INJECTION, SOLUTION INTRAVENOUS at 10:13

## 2024-02-12 RX ADMIN — FENTANYL CITRATE 50 MCG: 50 INJECTION, SOLUTION INTRAMUSCULAR; INTRAVENOUS at 11:11

## 2024-02-12 RX ADMIN — MIDAZOLAM 1 MG: 1 INJECTION INTRAMUSCULAR; INTRAVENOUS at 11:10

## 2024-02-12 ASSESSMENT — ACTIVITIES OF DAILY LIVING (ADL): ADLS_ACUITY_SCORE: 35

## 2024-02-12 NOTE — PRE-PROCEDURE
GENERAL PRE-PROCEDURE:   Procedure:  Right port a catheter removal with moderate sedation  Date/Time:  2/12/2024 10:41 AM    Written consent obtained?: Yes    Risks and benefits: Risks, benefits and alternatives were discussed    Consent given by:  Patient  Patient states understanding of procedure being performed: Yes    Patient's understanding of procedure matches consent: Yes    Procedure consent matches procedure scheduled: Yes    Expected level of sedation:  Moderate  Appropriately NPO:  Yes  ASA Class:  3  Mallampati  :  Grade 2- soft palate, base of uvula, tonsillar pillars, and portion of posterior pharyngeal wall visible  Lungs:  Lungs clear with good breath sounds bilaterally  Heart:  Normal heart sounds and rate  History & Physical reviewed:  History and physical reviewed and no updates needed  Statement of review:  I have reviewed the lab findings, diagnostic data, medications, and the plan for sedation    Total time: 25 minutes    Thanks [facility] Interventional Radiology CNP ([phone]) (phone [phone])

## 2024-02-12 NOTE — PROGRESS NOTES
Care Suites Post Procedure Note    Patient Information  Name: Jayson Banegas  Age: 28 year old    Post Procedure  Time patient returned to Care Suites: 1130  Concerns/abnormal assessment: NONE   If abnormal assessment, provider notified: N/A  Plan/Other: post care as ordered discharge to home.  Ladonna Hancock RN

## 2024-02-12 NOTE — IR NOTE
11Interventional Radiology Intra-procedural Nursing Note    Patient Name: Jayson Banegas  Medical Record Number: [id number]  Today's Date: February 12, 2024    Procedure consented for :   Right port a catheter removal with moderate sedation   Start time: 1113  End time: 1123  Report provided to: VIVIENNE RN  Patient depart time and location: 1130 to CS 19    Note: Patient entered Interventional Radiology Suite number 1 via cart. Patient awake, alert and oriented. Assisted onto procedural table in supine position. Prepped and draped.  Dr. Spencer in room. Time out and procedure started. Vital signs stable. Telemetry reading SR.    Procedure well tolerated by patient without complications. Procedure end with debrief by Dr. Spencer.  Pressure held until hemostasis achieved. Gauze and tegaderm dressing applied to right chest wall.        Administered medication totals:  Lidocaine 1% 10 mL Intradermal  Versed 2 mg IVP  Fentanyl 150 mcg IVP    Last dose of sedation administered at 1118.

## 2024-02-12 NOTE — DISCHARGE SUMMARY
Care Suites Discharge Nursing Note    Patient Information  Name: Jayson Banegas  Age: 28 year old    Discharge Education:  Discharge instructions reviewed: Yes  Additional education/resources provided: AVS  Patient/patient representative verbalizes understanding: Yes  Patient discharging on new medications: No  Medication education completed: N/A    Discharge Plans:   Discharge location: home  Discharge ride contacted: Yes  Approximate discharge time: 1225    Discharge Criteria:  Discharge criteria met and vital signs stable: Yes    Patient Belongs:  Patient belongings returned to patient: Yes    Yash Sarkar RN

## 2024-02-12 NOTE — DISCHARGE INSTRUCTIONS
Port Removal Discharge Instructions     After you go home:    Have an adult stay with you for the first 6 hours  You may resume your normal diet       For 24 hours - due to the sedation you received:  Relax and take it easy  Do NOT make any important or legal decisions  Do NOT drive or operate machines at home or at work  Do NOT drink alcohol    Care of Puncture Site:    Keep the dressings on your sites clean & dry for 24-48 hours. Change it only if it gets wet or dirty.  You may take a shower 24-48 hours after your procedure. Do not scrub site.  No submerging site for 2 weeks or until the incision is completely healed.  Do not remove the small white strips of tape, if present. Allow them to fall off on their own.   You may cover the wound with a bandaid if needed for comfort.      Activity     Avoid heavy lifting (greater than 10 pounds) or the overuse of your shoulder for 48-72 hours.    Bleeding:    If you start bleeding from the incision site in your chest or have swelling in your neck, sit down and press on the site for 5-10 minutes.   If bleeding has not stopped after 10 minutes, call your provider.        Call 911 right away if you have heavy bleeding or bleeding that does not stop.      Medicines:    You may resume all medications  For minor pain, you may take Acetaminophen (Tylenol) or Ibuprofen (Advil)          Call the provider who ordered this procedure if:    You have swelling in your neck or over your port site  The incision area is red, swollen, hot or tender  You have chills or a fever greater than 101 F (38 C)  Any questions or concerns    Call  911 or go to the Emergency Room if you have:    Severe chest pain or trouble breathing  Bleeding that you cannot control    If you have questions call:          [facility] Radiology Dept @ [phone]    The provider who performed your procedure was Dr. Pace.

## 2024-02-12 NOTE — PROGRESS NOTES
Care Suites Admission Nursing Note    Patient Information  Name: Jayson Banegas  Age: 28 year old  Reason for admission: poet removal  Care Suites arrival time: 0930    Visitor Information  Name: Deb-mom     Patient Admission/Assessment   Pre-procedure assessment complete: Yes  If abnormal assessment/labs, provider notified: N/A  NPO: Yes  Medications held per instructions/orders: Yes  Consent: obtained  If applicable, pregnancy test status: deferred  Patient oriented to room: Yes  Education/questions answered: Yes  Plan/other: scheduled for 1100    Discharge Planning  Discharge name/phone number: Deb [phone]  Overnight post sedation caregiver: girlfriend  Discharge location: [place]    Sherice Luke RN

## 2024-02-15 ENCOUNTER — NURSE TRIAGE (OUTPATIENT)
Dept: NURSING | Facility: CLINIC | Age: 29
End: 2024-02-15
Payer: COMMERCIAL

## 2024-02-15 NOTE — TELEPHONE ENCOUNTER
"Nurse Triage SBAR    Is this a 2nd Level Triage? NO    Situation: Delroy calling concerned he has difficulty sleeping and his legs get restless at night.   Background: Hx Testicular cancer last chemo was 4 weeks ago. Stopped taking MS contin 3 days ago    Assessment: It took 2 hours to fall asleep last night and has been having trouble sleeping for the past 3 night.  States unpleasant feeling in legs \"won't stop.\"  Protocol Recommended Disposition:   See in Office Within 2 Weeks    Recommendation: Agreed to try care advice and will message Dr Lincoln for further recommendation         Does the patient meet one of the following criteria for ADS visit consideration? No  Juana Tejeda RN on 2/15/2024 at 2:29 PM      Reason for Disposition   Restless legs or unpleasant feeling in legs and causes insomnia    Additional Information   Negative: Insomnia persists > 1 week and following Insomnia Care Advice   Negative: Insomnia interferes with work or school   Negative: Pain is causing insomnia and pain is a chronic symptom (recurrent or ongoing AND present > 4 weeks)   Negative: Requesting medication for sleep ('sleeping pill')   Negative: Patient wants to be seen   Negative: Awakened by jerking leg movements    Protocols used: Insomnia-A-[place]    "

## 2024-02-20 ENCOUNTER — VIRTUAL VISIT (OUTPATIENT)
Dept: PALLIATIVE CARE | Facility: CLINIC | Age: 29
End: 2024-02-20
Attending: INTERNAL MEDICINE
Payer: COMMERCIAL

## 2024-02-20 VITALS
DIASTOLIC BLOOD PRESSURE: 72 MMHG | BODY MASS INDEX: 30.36 KG/M2 | SYSTOLIC BLOOD PRESSURE: 120 MMHG | HEIGHT: 69 IN | WEIGHT: 205 LBS

## 2024-02-20 DIAGNOSIS — C80.1 EMBRYONAL CARCINOMA (H): Primary | ICD-10-CM

## 2024-02-20 PROCEDURE — 99215 OFFICE O/P EST HI 40 MIN: CPT | Mod: 95 | Performed by: INTERNAL MEDICINE

## 2024-02-20 ASSESSMENT — PAIN SCALES - GENERAL: PAINLEVEL: NO PAIN (0)

## 2024-02-20 NOTE — LETTER
2/20/2024       RE: Jayson Banegas  [address]  [address]  [facility] [address]     Dear Colleague,    Thank you for referring your patient, Jayson Banegas, to the [facility]. Please see a copy of my visit note below.    Palliative Care Outpatient Clinic      Patient ID:  Medical - He has metastatic testicular cancer (embryonal carcinoma), presenting late Sept 2023 (mets to lung, intraabd & retroperitoneal lymph nodes).   11/2023 initiated BEP in the hospital, completed 1/2024.     He has OBED; MDD since 13 years old, started ADs when 18; PTSD; alcohol use disorder--went through chem dep treatment 6 years ago, been in recovery since.     Social - Lives with girlfriend. Parents closely involved. Mom Deb. Stays with parents sometimes. Grandma stays with him during the day sometimes. 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     Care Planning - No HCD  10/2023 Dr Wick told him 73% 5 year survival. Cancer is curable. No HCD on file; has been discussed.     Opioid Safety -   Hx of AUD in recovery x 6 years  Martin discussion with him about elevated addiction risk with opioids and bzds 11/2023.   11/2023 asked him not to drive until specifically told it's ok  D/w him plan to fully taper opioids off, assuming he responds well to his definitive anticancer tx, even if he has some residual pain.         History:  History gathered today from: patient, medical chart    He rapidly self-tapered off opioids and had withdrawal symptoms. These were miserable. We got him hydroxyzine which helped. He's doing very well this week.  He has minimal pain now!  He's off chemo and is now on surveillance!  He thinks he'll be ready to return to work in a couple weeks.    Mood  He feels it's low and wants psychotherapy. All the gravity of what he's been through has hit him.  Doing yoga    PE: There were no vitals taken  for this visit.   Wt Readings from Last 3 Encounters:   02/12/24 94.3 kg (208 lb)   02/07/24 96.2 kg (212 lb)   01/22/24 93.8 kg (206 lb 12.8 oz)     Alert NAD  Healthy appearing      Data reviewed:  I reviewed recent labs and imaging, my comments:  Cr 0.8  LFTs nl  Plt 387    CT 2/5 shows unchanged LAD from prior; images personally reviewed today     database reviewed: y      Impression & Recommendations:  27 yo with metastatic testicular (embroyonal) cancer s/p BEP    Initial cancer related pain has resolved and he unfortunately self-discontinued his opioids without consulting anyone and had bad agitation/anxiety/withdrawal but he's through that now.     Mood:  D/w him it's not uncommon for people's mood to crash at this point -- he's done with chemo, things are good, and this is sometimes the first time people allow themselves to 'relax' and face what they've just been through (because they are no longer in 'just get through chemo mode'). I think that's happening to him. He is doing things to care for himself like yoga.  I rec'd psychotherapy; offered him a referral; he has connections close to home he'll go with which is great.    He asks could the cancer come back; I reviewed with him Dr Wick communicated around his time of dx he had a 75% chance of 5 year survival and I dw him what that means; and that yes there is some chance it comes back. I think he knew that, but also needed to ask to be sure.    I celebrated with him that he's 'graduated' from palliative care. We are happy to see him back at anytime if we can be of further help.        Over 40 minutes spent on the date of the encounter doing chart review, history and exam, patient education & counseling, documentation and other activities as noted above.    Thank you for involving us in the patient's care.   Jayesh Monahan MD / Palliative Medicine / Text me via [employer]  This note may have been composed with voice recognition software and there may be  mistranscriptions.        Again, thank you for allowing me to participate in the care of your patient.      Sincerely,    Jayesh Monahan MD

## 2024-02-20 NOTE — PROGRESS NOTES
Virtual Visit Details    Type of service:  Video Visit     Originating Location (pt. Location): Home    Distant Location (provider location):  Off-site  Platform used for Video Visit: [facility]      Palliative Care Outpatient Clinic      Patient ID:  Medical - He has metastatic testicular cancer (embryonal carcinoma), presenting late Sept 2023 (mets to lung, intraabd & retroperitoneal lymph nodes).   11/2023 initiated BEP in the hospital, completed 1/2024.     He has OBED; MDD since 13 years old, started ADs when 18; PTSD; alcohol use disorder--went through chem dep treatment 6 years ago, been in recovery since.     Social - Lives with girlfriend. Parents closely involved. Mom Deb. Stays with parents sometimes. Grandma stays with him during the day sometimes. 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     Care Planning - No HCD  10/2023 Dr Wick told him 73% 5 year survival. Cancer is curable. No HCD on file; has been discussed.     Opioid Safety -   Hx of AUD in recovery x 6 years  Martin discussion with him about elevated addiction risk with opioids and bzds 11/2023.   11/2023 asked him not to drive until specifically told it's ok  D/w him plan to fully taper opioids off, assuming he responds well to his definitive anticancer tx, even if he has some residual pain.         History:  History gathered today from: patient, medical chart    He rapidly self-tapered off opioids and had withdrawal symptoms. These were miserable. We got him hydroxyzine which helped. He's doing very well this week.  He has minimal pain now!  He's off chemo and is now on surveillance!  He thinks he'll be ready to return to work in a couple weeks.    Mood  He feels it's low and wants psychotherapy. All the gravity of what he's been through has hit him.  Doing yoga    PE: There were no vitals taken for this visit.   Wt Readings from Last 3 Encounters:   02/12/24 94.3 kg (208 lb)   02/07/24 96.2 kg (212 lb)   01/22/24 93.8  kg (206 lb 12.8 oz)     Alert NAD  Healthy appearing      Data reviewed:  I reviewed recent labs and imaging, my comments:  Cr 0.8  LFTs nl  Plt 387    CT 2/5 shows unchanged LAD from prior; images personally reviewed today     database reviewed: y      Impression & Recommendations:  29 yo with metastatic testicular (embroyonal) cancer s/p BEP    Initial cancer related pain has resolved and he unfortunately self-discontinued his opioids without consulting anyone and had bad agitation/anxiety/withdrawal but he's through that now.     Mood:  D/w him it's not uncommon for people's mood to crash at this point -- he's done with chemo, things are good, and this is sometimes the first time people allow themselves to 'relax' and face what they've just been through (because they are no longer in 'just get through chemo mode'). I think that's happening to him. He is doing things to care for himself like yoga.  I rec'd psychotherapy; offered him a referral; he has connections close to home he'll go with which is great.    He asks could the cancer come back; I reviewed with him Dr Wick communicated around his time of dx he had a 75% chance of 5 year survival and I dw him what that means; and that yes there is some chance it comes back. I think he knew that, but also needed to ask to be sure.    I celebrated with him that he's 'graduated' from palliative care. We are happy to see him back at anytime if we can be of further help.        Over 40 minutes spent on the date of the encounter doing chart review, history and exam, patient education & counseling, documentation and other activities as noted above.    Thank you for involving us in the patient's care.   Jayesh Monahan MD / Palliative Medicine / Text me via [employer]  This note may have been composed with voice recognition software and there may be mistranscriptions.

## 2024-02-20 NOTE — NURSING NOTE
Is the patient currently in the state of [place]? YES    Visit mode:VIDEO    If the visit is dropped, the patient can be reconnected by: VIDEO VISIT: Text to cell phone:   Telephone Information:   Mobile [phone]       Will anyone else be joining the visit? NO  (If patient encounters technical issues they should call [phone]    How would you like to obtain your AVS? MyChart    Are changes needed to the allergy or medication list? Pt stated no changes to allergies and Pt stated no med changes    Reason for visit: VITO Noland VVF

## 2024-02-29 ENCOUNTER — PRE VISIT (OUTPATIENT)
Dept: OTOLARYNGOLOGY | Facility: CLINIC | Age: 29
End: 2024-02-29

## 2024-03-01 ENCOUNTER — TRANSFERRED RECORDS (OUTPATIENT)
Dept: HEALTH INFORMATION MANAGEMENT | Facility: CLINIC | Age: 29
End: 2024-03-01
Payer: COMMERCIAL

## 2024-03-07 ENCOUNTER — MYC MEDICAL ADVICE (OUTPATIENT)
Dept: ONCOLOGY | Facility: CLINIC | Age: 29
End: 2024-03-07
Payer: COMMERCIAL

## 2024-03-11 ENCOUNTER — DOCUMENTATION ONLY (OUTPATIENT)
Dept: ONCOLOGY | Facility: CLINIC | Age: 29
End: 2024-03-11
Payer: COMMERCIAL

## 2024-03-11 NOTE — NURSING NOTE
Lab orders faxed to [facility] per pt request for upcoming lab draw in April. Receipt confirmation via [employer] F[employer]    Tierney Santo RN

## 2024-04-02 ENCOUNTER — TELEPHONE (OUTPATIENT)
Dept: FAMILY MEDICINE | Facility: CLINIC | Age: 29
End: 2024-04-02
Payer: COMMERCIAL

## 2024-04-02 NOTE — TELEPHONE ENCOUNTER
CC: Patient calling regarding oncology scheduling with Dr. Wick.     Writer notified patient he reached [facility] care [facility], provided patient with Dr. Wick's office phone number [phone] and direct transferred patient, patient is appreciative.    Signing encounter.     Manish Vicente RN  [facility]

## 2024-04-09 ENCOUNTER — TELEPHONE (OUTPATIENT)
Dept: ONCOLOGY | Facility: CLINIC | Age: 29
End: 2024-04-09

## 2024-04-09 NOTE — TELEPHONE ENCOUNTER
Left a VM for pt to call back to clinic.  Please find out if the pt had his labs drawn for his appt tomorrow.  If he did, please have him call the clinic that they were drawn at and have them fax the results to us at [phone].  If he did not have them drawn, please let me know as he may need to reschedule his appt with Dr. Wick until we get lab results.    Shari Schoenberger, CMA

## 2024-04-10 DIAGNOSIS — F10.10 ALCOHOL ABUSE: Primary | ICD-10-CM

## 2024-04-10 RX ORDER — NALTREXONE HYDROCHLORIDE 50 MG/1
50 TABLET, FILM COATED ORAL DAILY
Qty: 90 TABLET | Refills: 2 | Status: SHIPPED | OUTPATIENT
Start: 2024-04-10

## 2024-04-26 NOTE — PROGRESS NOTES
Virtual Visit Details    Type of service:  Video Visit     Originating Location (pt. Location): Home    Distant Location (provider location):  On-site  Video visit duration: 12 minutes  Platform used for Video Visit: [employer]     [facility]    Hematology/Oncology Established Patient Note      Today's Date: 5/1/2024    Reason for visit: Embryonal carcinoma.    HISTORY OF PRESENT ILLNESS: Jayson Banegas is a 29 year old male with history of alcoholism and nicotine dependence in remission, depression, anxiety, and PTSD who presents with the following oncologic history:  1. 9/2023: Presented to PCP with left lower quadrant abdominal pain.  Given 2 weeks of antibiotics with no improvement.  2. 9/29/2023: CT abdomen/pelvis with contrast at [facility] showed new 10 mm noncalcified left lower lobe lung nodule; no effusion; diffuse mild/moderate abdominal/pelvic retroperitoneal lymphadenopathy, largest nodes 3.0 x 1.8 cm   retrocrural, 3.0 x 1.8 cm left periaortic, 2.5 x 2.3 cm left   periaortic, 4.2 x 2.2 cm left external iliac, 2.0 x 1.4 cm right internal iliac, 2.7 x 2.2 cm left internal iliac, and 2.8 x 1.7 cm  left inguinal. No right inguinal lymphadenopathy. Multiple small, mildly enlarged mesenteric root lymph nodes. Mild haziness mesenteric root and retroperitoneal fat. Right testicle now located in scrotum; left testicle not included in field of view; no bone lesions.  3. 9/29/2023: U/S of scrotum with Doppler showed no evidence for intratesticular mass; small benign right epididymal head cyst; small left varicocele. CBC normal except monocytes slightly elevated at 1.1; normal BMP; CRP slightly elevated at 1.04.  4. 10/02/2023: CT chest with contrast showed multiple indeterminate well-circumscribed solid pulmonary nodules in the right lung; 3 mm nodule anterior right upper lobe; 8 mm solid pulmonary nodule in right middle lobe; 6 mm nodule in lateral right lower lobe;  additional 10 mm right lower lobe lung nodule; no infiltrates or effusions; unchanged upper abdominal lymphadenopathy.  5. 10/11/2023: CT-guided biopsy of retroperitoneal lymph node at [facility] showed embryonal carcinoma, cannot rule out mixed type. Biopsy of 6 mm of right lung biopsy was negative for malignancy. LDH elevated at 1643. Beta-HCG = 856, AFP = 34. Biopsy specimen sent to [facility] for review of pathology.  6. 10/23/2023: Presented with chest pain.  CT chest at [facility] showed no PE. Multiple pulmonary nodules enlarged since 10/02/2023. Prior right lower lobe lung nodule 7 mm, now 13 mm, prior 8 mm right middle lobe nodule now 12 mm; prior 6 mm right lower lobe lung nodule now 12 mm; new 2 mm right lower lobe lung nodule; enlarging left upper quadrant mass arising from or engulfing left adrenal gland 2.2 x 4.6 cm; upper abdominal lymphadenopathy have enlarged.  7. 10/26/2023: PET/CT scan showed multiple hypermetabolic pulmonary nodules most likely reflecting metastatic disease. Abnormal hypermetabolic left retrocrural lymph node. Otherwise no abnormal adenopathy in the neck or chest. Diffuse hypermetabolic lymphadenopathy throughout the retroperitoneum, mesentery, and bilateral iliac chains left greater than right as well as in the left groin region.   8.  11/1/2023-1/22/2024: Completion of BEP - bleomycin, etoposide, cisplatin - x 4 cycles.  9. 2/5/2024: CT C/A/P showed mildly enlarged mesenteric and retroperitoneal lymph nodes with representative left periaortic lymph node measuring 1.1 cm, previously 1.2 cm; no new lymph nodes; new interstitial infiltrates throughout the right lung, involving the upper, middle, and lower lobes. Pulmonary nodules overall stable with a right lower lobe pulmonary nodule  near the diaphragm contains a punctate focus of calcification, and is unchanged in size at 12 mm; 3 mm left upper lobe pulmonary nodule is stable; no new pulmonary nodules; mildly  prominent right hilar lymph nodes likely reactive.    INTERVAL HISTORY:  Delroy reports improvement but still has intermittent mild sharp pain in the right rib. X-rays were negative from 4/19/2024. He has been struggling with alcoholism since I last saw him.  However, he has been taking naltrexone has been sober since starting it 4/11/2024.      REVIEW OF SYSTEMS:   14 point ROS was reviewed and is negative other than as noted above in HPI.       HOME MEDICATIONS:  Current Outpatient Medications   Medication Sig Dispense Refill    naltrexone (DEPADE/REVIA) 50 MG tablet Take 1 tablet (50 mg) by mouth daily 90 tablet 2    sertraline (ZOLOFT) 100 MG tablet Take 1.5 tablets (150 mg) by mouth daily 45 tablet 2         ALLERGIES:  Allergies   Allergen Reactions    Sulfamethoxazole-Trimethoprim Rash         PAST MEDICAL HISTORY:  Embryonal carcinoma.      PAST SURGICAL HISTORY:  [place] tooth removal.    SOCIAL HISTORY:  Social History     Socioeconomic History    Marital status: Single     Spouse name: Not on file    Number of children: Not on file    Years of education: Not on file    Highest education level: Not on file   Occupational History    Not on file   Tobacco Use    Smoking status: Former     Types: Cigarettes     Passive exposure: Never    Smokeless tobacco: Never   Substance and Sexual Activity    Alcohol use: Not on file    Drug use: Not on file    Sexual activity: Not on file   Other Topics Concern    Not on file   Social History Narrative    Not on file     Social Determinants of Health     Financial Resource Strain: Medium Risk (2/26/2024)    Received from [facility]    Overall Financial Resource Strain (CARDIA)     Difficulty of Paying Living Expenses: Somewhat hard   Food Insecurity: No Food Insecurity (2/26/2024)    Received from [facility]    Hunger Vital Sign     Worried About Running Out of Food in the Last Year: Never true     Ran Out of Food in the Last  Year: Never true   Transportation Needs: No Transportation Needs (2/26/2024)    [facility]    Transportation Needs     In the past 12 months, has lack of transportation kept you from medical appointments, meetings, work, or from getting medicines or things needed for daily living?: No   Physical Activity: Sufficiently Active (2/26/2024)    Received from [facility]    Exercise Vital Sign     Days of Exercise per Week: 5 days     Minutes of Exercise per Session: 30 min   Stress: Stress Concern Present (2/26/2024)    Received from [facility]    [employer] [place] - Occupational Stress Questionnaire     Feeling of Stress : To some extent   Social Connections: Socially Integrated (2/26/2024)    Received from [facility]    Social Connection and Isolation Panel [NHANES]     Frequency of Communication with Friends and Family: More than three times a week     Frequency of Social Gatherings with Friends and Family: Three times a week     Attends Christian Services: More than 4 times per year     Active Member of Clubs or Organizations: No     Attends Club or Organization Meetings: More than 4 times per year     Marital Status: Living with partner   Interpersonal Safety: Not At Risk (3/4/2024)    Received from [facility]    Intimate Partner Violence     Are you in a relationship where you are physically hurt, threatened and/or made to feel afraid?: No   Housing Stability: Low Risk  (2/26/2024)    Received from [facility]    Housing Stability     In the last 12 months, was there a time when you were not able to pay the mortgage or rent on time?: No     In the last 12 months, how many places have you lived?: 1     Number of Places Lived in the Last Year (Outpatient): Not on file     Number of Places Lived in the Last Year (Inpatient): Not on file     In the last  12 months, was there a time when you did not have a steady place to sleep or slept in a shelter (including now)?: No         FAMILY HISTORY:  BRCA-2 mutation on paternal side of family. Paternal great aunt with unknown type of cancer. Maternal grandparent with pancreatic cancer.    PHYSICAL EXAM:  Vital signs:  There were no vitals taken for this visit.   GENERAL: No acute distress.  EYES: No scleral icterus. No overt erythema.  RESPIRATORY: No audible cough, wheezing, or labored breathing.  MUSCULOSKELETAL: Range of motion in the neck, shoulders, and arms appear normal.  SKIN: No overt rashes, discolorations, or lesions over the face and neck.  NEUROLOGIC: Alert.  No overt tremors.  PSYCHIATRIC: Normal affect and mood.  Does not appear anxious.       LABS:  4/5/2024 [facility] labs:  WBC 4.9  Hgb 14  Platelets 223,000  Creatinine 0.64  Alk phos 51  Total bilirubin 0.3  AST 28  ALT 28  AFP = 3.1  Beta-HCG < 0.6  LDH = 208    4/19/2024:  CBC normal.  AST 40  ALT 91      ASSESSMENT/PLAN:  Jayson Banegas is a 29 year old male with the following issues:  1. Embryonal carcinoma, clinical stage IIIB, [id number], S2 (pre-orchiectomy), intermediate risk  2. 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 Diffuse abdominal, pelvic, and retroperitoneal lymphadenopathy  --10/11/2023 CT-guided biopsy of retroperitoneal lymph node confirmed an embryonal carcinoma, which is nonseminoma type.  --Stage III disease, overall prognosis with 5-year survival rate of 73%, all treatment with curative intent.  --He saw urology with Dr. Gilmore with no indication to do orchiectomy prior to chemo as no lesions were seen on prior U/S of scrotum.    --Delroy completed sperm banking prior to chemo.  --Delroy completed 4 cycles of BEP on 1/22/2024.  -- 2/5/2024 CT C/A/P showed mildly enlarged mesenteric and retroperitoneal lymph nodes with representative left periaortic lymph node measuring 1.1 cm, previously 1.2 cm; no new lymph nodes; new interstitial  infiltrates throughout the right lung, involving the upper, middle, and lower lobes. Pulmonary nodules overall stable with a right lower lobe pulmonary nodule near the diaphragm contains a punctate focus of calcification, and is unchanged in size at 12 mm; 3 mm left upper lobe pulmonary nodule is stable; no new pulmonary nodules; mildly prominent right hilar lymph nodes likely reactive.  --No clinical evidence of persistent disease with any lymph nodes > 2 cm -- therefore, RPLND/surgical resection not indicated.  --I reviewed Delroy's labs  from 4/5/2024 which showed normal CBC, CMP, LDH, AFP, and beta-HCG tumor markers.  --Continue surveillance post-chemo every 2 months year 1, every 4 months year 2, every 6 months years 3-5, annually after year 5 and check serum HCG and AFP, chest x-ray.  --Repeat CBC, CMP and tumor markers prior to each visit.    4. Restrictive lung disease  --Prior smoker.  --Denies dyspnea  --3/4/2024 PFTs showed severe restrictive lung disease with small component of obstruction; DLCO 40%.  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 He has no breathing issues at present time.    5. Tinnitus  --S/p audiology exam 2/29/2024.  --Resolved.    6. GERD  --Left upper quadrant and epigastric pain likely related to GERD.  --Continue pantoprazole.    7. Right rib pain  --Reviewed that rib and chest x-rays were negative.  --Likely had component of costochondritis.  Received course of amoxicillin and prednisone with much improvement.    8. Alcoholism  --On naltrexone and sober since 4/11/2024.  --He is receiving psychotherapy.    9. Elevated transaminases  --Related to alcohol use.  --Now sober.  --Discussed he can have liver enzymes repeated with his PCP.  Will likely improve off alcohol.    Return in 2 months.    Heather Wick MD  [facility]  Hematology/Oncology     Total time spent today: 30 minutes in chart review, patient evaluation, counseling, documentation, test and/or medication/prescription orders, and extensive coordination of care.

## 2024-05-01 ENCOUNTER — VIRTUAL VISIT (OUTPATIENT)
Dept: ONCOLOGY | Facility: CLINIC | Age: 29
End: 2024-05-01
Attending: INTERNAL MEDICINE
Payer: COMMERCIAL

## 2024-05-01 VITALS — WEIGHT: 205 LBS | HEIGHT: 69 IN | BODY MASS INDEX: 30.36 KG/M2

## 2024-05-01 DIAGNOSIS — R74.01 ELEVATION OF LEVELS OF LIVER TRANSAMINASE LEVELS: ICD-10-CM

## 2024-05-01 DIAGNOSIS — C80.1 EMBRYONAL CARCINOMA (H): Primary | ICD-10-CM

## 2024-05-01 DIAGNOSIS — R07.81 RIB PAIN ON RIGHT SIDE: ICD-10-CM

## 2024-05-01 PROCEDURE — 99214 OFFICE O/P EST MOD 30 MIN: CPT | Mod: 95 | Performed by: INTERNAL MEDICINE

## 2024-05-01 ASSESSMENT — PAIN SCALES - GENERAL: PAINLEVEL: NO PAIN (0)

## 2024-05-01 NOTE — LETTER
5/1/2024         RE: Jayson Banegas  [address]  [address]  [facility] [address]        Dear Colleague,    Thank you for referring your patient, Jayson Banegas, to the [facility]. Please see a copy of my visit note below.    Virtual Visit Details    Type of service:  Video Visit     Originating Location (pt. Location): Home    Distant Location (provider location):  On-site  Video visit duration: 12 minutes  Platform used for Video Visit: [facility]    Hematology/Oncology Established Patient Note      Today's Date: 5/1/2024    Reason for visit: Embryonal carcinoma.    HISTORY OF PRESENT ILLNESS: Jayson Banegas is a 29 year old male with history of alcoholism and nicotine dependence in remission, depression, anxiety, and PTSD who presents with the following oncologic history:  1. 9/2023: Presented to PCP with left lower quadrant abdominal pain.  Given 2 weeks of antibiotics with no improvement.  2. 9/29/2023: CT abdomen/pelvis with contrast at [facility] showed new 10 mm noncalcified left lower lobe lung nodule; no effusion; diffuse mild/moderate abdominal/pelvic retroperitoneal lymphadenopathy, largest nodes 3.0 x 1.8 cm   retrocrural, 3.0 x 1.8 cm left periaortic, 2.5 x 2.3 cm left   periaortic, 4.2 x 2.2 cm left external iliac, 2.0 x 1.4 cm right internal iliac, 2.7 x 2.2 cm left internal iliac, and 2.8 x 1.7 cm  left inguinal. No right inguinal lymphadenopathy. Multiple small, mildly enlarged mesenteric root lymph nodes. Mild haziness mesenteric root and retroperitoneal fat. Right testicle now located in scrotum; left testicle not included in field of view; no bone lesions.  3. 9/29/2023: U/S of scrotum with Doppler showed no evidence for intratesticular mass; small benign right epididymal head cyst; small left varicocele. CBC normal except monocytes slightly elevated at 1.1; normal BMP; CRP slightly elevated at 1.04.  4.  10/02/2023: CT chest with contrast showed multiple indeterminate well-circumscribed solid pulmonary nodules in the right lung; 3 mm nodule anterior right upper lobe; 8 mm solid pulmonary nodule in right middle lobe; 6 mm nodule in lateral right lower lobe; additional 10 mm right lower lobe lung nodule; no infiltrates or effusions; unchanged upper abdominal lymphadenopathy.  5. 10/11/2023: CT-guided biopsy of retroperitoneal lymph node at [facility] showed embryonal carcinoma, cannot rule out mixed type. Biopsy of 6 mm of right lung biopsy was negative for malignancy. LDH elevated at 1643. Beta-HCG = 856, AFP = 34. Biopsy specimen sent to [facility] for review of pathology.  6. 10/23/2023: Presented with chest pain.  CT chest at [facility] showed no PE. Multiple pulmonary nodules enlarged since 10/02/2023. Prior right lower lobe lung nodule 7 mm, now 13 mm, prior 8 mm right middle lobe nodule now 12 mm; prior 6 mm right lower lobe lung nodule now 12 mm; new 2 mm right lower lobe lung nodule; enlarging left upper quadrant mass arising from or engulfing left adrenal gland 2.2 x 4.6 cm; upper abdominal lymphadenopathy have enlarged.  7. 10/26/2023: PET/CT scan showed multiple hypermetabolic pulmonary nodules most likely reflecting metastatic disease. Abnormal hypermetabolic left retrocrural lymph node. Otherwise no abnormal adenopathy in the neck or chest. Diffuse hypermetabolic lymphadenopathy throughout the retroperitoneum, mesentery, and bilateral iliac chains left greater than right as well as in the left groin region.   8.  11/1/2023-1/22/2024: Completion of BEP - bleomycin, etoposide, cisplatin - x 4 cycles.  9. 2/5/2024: CT C/A/P showed mildly enlarged mesenteric and retroperitoneal lymph nodes with representative left periaortic lymph node measuring 1.1 cm, previously 1.2 cm; no new lymph nodes; new interstitial infiltrates throughout the right lung, involving the upper, middle, and lower lobes.  Pulmonary nodules overall stable with a right lower lobe pulmonary nodule  near the diaphragm contains a punctate focus of calcification, and is unchanged in size at 12 mm; 3 mm left upper lobe pulmonary nodule is stable; no new pulmonary nodules; mildly prominent right hilar lymph nodes likely reactive.    INTERVAL HISTORY:  Delroy reports improvement but still has intermittent mild sharp pain in the right rib. X-rays were negative from 4/19/2024. He has been struggling with alcoholism since I last saw him.  However, he has been taking naltrexone has been sober since starting it 4/11/2024.      REVIEW OF SYSTEMS:   14 point ROS was reviewed and is negative other than as noted above in HPI.       HOME MEDICATIONS:  Current Outpatient Medications   Medication Sig Dispense Refill     naltrexone (DEPADE/REVIA) 50 MG tablet Take 1 tablet (50 mg) by mouth daily 90 tablet 2     sertraline (ZOLOFT) 100 MG tablet Take 1.5 tablets (150 mg) by mouth daily 45 tablet 2         ALLERGIES:  Allergies   Allergen Reactions     Sulfamethoxazole-Trimethoprim Rash         PAST MEDICAL HISTORY:  Embryonal carcinoma.      PAST SURGICAL HISTORY:  [place] tooth removal.    SOCIAL HISTORY:  Social History     Socioeconomic History     Marital status: Single     Spouse name: Not on file     Number of children: Not on file     Years of education: Not on file     Highest education level: Not on file   Occupational History     Not on file   Tobacco Use     Smoking status: Former     Types: Cigarettes     Passive exposure: Never     Smokeless tobacco: Never   Substance and Sexual Activity     Alcohol use: Not on file     Drug use: Not on file     Sexual activity: Not on file   Other Topics Concern     Not on file   Social History Narrative     Not on file     Social Determinants of Health     Financial Resource Strain: Medium Risk (2/26/2024)    [facility]    Overall Financial Resource Strain (CARDIA)       Difficulty of Paying Living Expenses: Somewhat hard   Food Insecurity: No Food Insecurity (2/26/2024)    Received from [facility]    Hunger Vital Sign      Worried About Running Out of Food in the Last Year: Never true      Ran Out of Food in the Last Year: Never true   Transportation Needs: No Transportation Needs (2/26/2024)    Received from [facility]    Transportation Needs      In the past 12 months, has lack of transportation kept you from medical appointments, meetings, work, or from getting medicines or things needed for daily living?: No   Physical Activity: Sufficiently Active (2/26/2024)    Received from [facility]    Exercise Vital Sign      Days of Exercise per Week: 5 days      Minutes of Exercise per Session: 30 min   Stress: Stress Concern Present (2/26/2024)    [facility]    [employer] - Occupational Stress Questionnaire      Feeling of Stress : To some extent   Social Connections: Socially Integrated (2/26/2024)    Received from [facility]    Social Connection and Isolation Panel [NHANES]      Frequency of Communication with Friends and Family: More than three times a week      Frequency of Social Gatherings with Friends and Family: Three times a week      Attends Congregation Services: More than 4 times per year      Active Member of Clubs or Organizations: No      Attends Club or Organization Meetings: More than 4 times per year      Marital Status: Living with partner   Interpersonal Safety: Not At Risk (3/4/2024)    Received from [facility]    Intimate Partner Violence      Are you in a relationship where you are physically hurt, threatened and/or made to feel afraid?: No   Housing Stability: Low Risk  (2/26/2024)    Received from [facility]    Housing Stability      In the last 12 months, was there a  time when you were not able to pay the mortgage or rent on time?: No      In the last 12 months, how many places have you lived?: 1      Number of Places Lived in the Last Year (Outpatient): Not on file      Number of Places Lived in the Last Year (Inpatient): Not on file      In the last 12 months, was there a time when you did not have a steady place to sleep or slept in a shelter (including now)?: No         FAMILY HISTORY:  BRCA-2 mutation on paternal side of family. Paternal great aunt with unknown type of cancer. Maternal grandparent with pancreatic cancer.    PHYSICAL EXAM:  Vital signs:  There were no vitals taken for this visit.   GENERAL: No acute distress.  EYES: No scleral icterus. No overt erythema.  RESPIRATORY: No audible cough, wheezing, or labored breathing.  MUSCULOSKELETAL: Range of motion in the neck, shoulders, and arms appear normal.  SKIN: No overt rashes, discolorations, or lesions over the face and neck.  NEUROLOGIC: Alert.  No overt tremors.  PSYCHIATRIC: Normal affect and mood.  Does not appear anxious.       LABS:  4/5/2024 [facility] labs:  WBC 4.9  Hgb 14  Platelets 223,000  Creatinine 0.64  Alk phos 51  Total bilirubin 0.3  AST 28  ALT 28  AFP = 3.1  Beta-HCG < 0.6  LDH = 208    4/19/2024:  CBC normal.  AST 40  ALT 91      ASSESSMENT/PLAN:  Jayson Banegas is a 29 year old male with the following issues:  1. Embryonal carcinoma, clinical stage IIIB, [id number], S2 (pre-orchiectomy), intermediate risk  2. 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 Diffuse abdominal, pelvic, and retroperitoneal lymphadenopathy  --10/11/2023 CT-guided biopsy of retroperitoneal lymph node confirmed an embryonal carcinoma, which is nonseminoma type.  --Stage III disease, overall prognosis with 5-year survival rate of 73%, all treatment with curative intent.  --He saw urology with Dr. Gilmore with no indication to do orchiectomy prior to chemo as no lesions were seen on prior U/S of scrotum.    --Delroy  completed sperm banking prior to chemo.  --Delroy completed 4 cycles of BEP on 1/22/2024.  -- 2/5/2024 CT C/A/P showed mildly enlarged mesenteric and retroperitoneal lymph nodes with representative left periaortic lymph node measuring 1.1 cm, previously 1.2 cm; no new lymph nodes; new interstitial infiltrates throughout the right lung, involving the upper, middle, and lower lobes. Pulmonary nodules overall stable with a right lower lobe pulmonary nodule near the diaphragm contains a punctate focus of calcification, and is unchanged in size at 12 mm; 3 mm left upper lobe pulmonary nodule is stable; no new pulmonary nodules; mildly prominent right hilar lymph nodes likely reactive.  --No clinical evidence of persistent disease with any lymph nodes > 2 cm -- therefore, RPLND/surgical resection not indicated.  --I reviewed Delroy's labs  from 4/5/2024 which showed normal CBC, CMP, LDH, AFP, and beta-HCG tumor markers.  --Continue surveillance post-chemo every 2 months year 1, every 4 months year 2, every 6 months years 3-5, annually after year 5 and check serum HCG and AFP, chest x-ray.  --Repeat CBC, CMP and tumor markers prior to each visit.    4. Restrictive lung disease  --Prior smoker.  --Denies dyspnea  --3/4/2024 PFTs showed severe restrictive lung disease with small component of obstruction; DLCO 40%.  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 He has no breathing issues at present time.    5. Tinnitus  --S/p audiology exam 2/29/2024.  --Resolved.    6. GERD  --Left upper quadrant and epigastric pain likely related to GERD.  --Continue pantoprazole.    7. Right rib pain  --Reviewed that rib and chest x-rays were negative.  --Likely had component of costochondritis.  Received course of amoxicillin and prednisone with much improvement.    8. Alcoholism  --On naltrexone  and sober since 4/11/2024.  --He is receiving psychotherapy.    9. Elevated transaminases  --Related to alcohol use.  --Now sober.  --Discussed he can have liver enzymes repeated with his PCP.  Will likely improve off alcohol.    Return in 2 months.    Heather Wick MD  [facility] Hematology/Oncology     Total time spent today: 30 minutes in chart review, patient evaluation, counseling, documentation, test and/or medication/prescription orders, and extensive coordination of care.        Again, thank you for allowing me to participate in the care of your patient.        Sincerely,        Heather Wick MD

## 2024-05-01 NOTE — LETTER
5/1/2024         RE: Jayson Banegas  [address]  [address]  [facility] [address]        Dear Colleague,    Thank you for referring your patient, Jayson Banegas, to the [facility]. Please see a copy of my visit note below.    Virtual Visit Details    Type of service:  Video Visit     Originating Location (pt. Location): Home    Distant Location (provider location):  On-site  Video visit duration: 12 minutes  Platform used for Video Visit: [facility]    Hematology/Oncology Established Patient Note      Today's Date: 5/1/2024    Reason for visit: Embryonal carcinoma.    HISTORY OF PRESENT ILLNESS: Jayson Banegas is a 29 year old male with history of alcoholism and nicotine dependence in remission, depression, anxiety, and PTSD who presents with the following oncologic history:  1. 9/2023: Presented to PCP with left lower quadrant abdominal pain.  Given 2 weeks of antibiotics with no improvement.  2. 9/29/2023: CT abdomen/pelvis with contrast at [facility] showed new 10 mm noncalcified left lower lobe lung nodule; no effusion; diffuse mild/moderate abdominal/pelvic retroperitoneal lymphadenopathy, largest nodes 3.0 x 1.8 cm   retrocrural, 3.0 x 1.8 cm left periaortic, 2.5 x 2.3 cm left   periaortic, 4.2 x 2.2 cm left external iliac, 2.0 x 1.4 cm right internal iliac, 2.7 x 2.2 cm left internal iliac, and 2.8 x 1.7 cm  left inguinal. No right inguinal lymphadenopathy. Multiple small, mildly enlarged mesenteric root lymph nodes. Mild haziness mesenteric root and retroperitoneal fat. Right testicle now located in scrotum; left testicle not included in field of view; no bone lesions.  3. 9/29/2023: U/S of scrotum with Doppler showed no evidence for intratesticular mass; small benign right epididymal head cyst; small left varicocele. CBC normal except monocytes slightly elevated at 1.1; normal BMP; CRP slightly elevated at 1.04.  4.  10/02/2023: CT chest with contrast showed multiple indeterminate well-circumscribed solid pulmonary nodules in the right lung; 3 mm nodule anterior right upper lobe; 8 mm solid pulmonary nodule in right middle lobe; 6 mm nodule in lateral right lower lobe; additional 10 mm right lower lobe lung nodule; no infiltrates or effusions; unchanged upper abdominal lymphadenopathy.  5. 10/11/2023: CT-guided biopsy of retroperitoneal lymph node at [facility] showed embryonal carcinoma, cannot rule out mixed type. Biopsy of 6 mm of right lung biopsy was negative for malignancy. LDH elevated at 1643. Beta-HCG = 856, AFP = 34. Biopsy specimen sent to [facility] for review of pathology.  6. 10/23/2023: Presented with chest pain.  CT chest at [facility] showed no PE. Multiple pulmonary nodules enlarged since 10/02/2023. Prior right lower lobe lung nodule 7 mm, now 13 mm, prior 8 mm right middle lobe nodule now 12 mm; prior 6 mm right lower lobe lung nodule now 12 mm; new 2 mm right lower lobe lung nodule; enlarging left upper quadrant mass arising from or engulfing left adrenal gland 2.2 x 4.6 cm; upper abdominal lymphadenopathy have enlarged.  7. 10/26/2023: PET/CT scan showed multiple hypermetabolic pulmonary nodules most likely reflecting metastatic disease. Abnormal hypermetabolic left retrocrural lymph node. Otherwise no abnormal adenopathy in the neck or chest. Diffuse hypermetabolic lymphadenopathy throughout the retroperitoneum, mesentery, and bilateral iliac chains left greater than right as well as in the left groin region.   8.  11/1/2023-1/22/2024: Completion of BEP - bleomycin, etoposide, cisplatin - x 4 cycles.  9. 2/5/2024: CT C/A/P showed mildly enlarged mesenteric and retroperitoneal lymph nodes with representative left periaortic lymph node measuring 1.1 cm, previously 1.2 cm; no new lymph nodes; new interstitial infiltrates throughout the right lung, involving the upper, middle, and lower lobes.  Pulmonary nodules overall stable with a right lower lobe pulmonary nodule  near the diaphragm contains a punctate focus of calcification, and is unchanged in size at 12 mm; 3 mm left upper lobe pulmonary nodule is stable; no new pulmonary nodules; mildly prominent right hilar lymph nodes likely reactive.    INTERVAL HISTORY:  Delroy reports improvement but still has intermittent mild sharp pain in the right rib. X-rays were negative from 4/19/2024. He has been struggling with alcoholism since I last saw him.  However, he has been taking naltrexone has been sober since starting it 4/11/2024.      REVIEW OF SYSTEMS:   14 point ROS was reviewed and is negative other than as noted above in HPI.       HOME MEDICATIONS:  Current Outpatient Medications   Medication Sig Dispense Refill     naltrexone (DEPADE/REVIA) 50 MG tablet Take 1 tablet (50 mg) by mouth daily 90 tablet 2     sertraline (ZOLOFT) 100 MG tablet Take 1.5 tablets (150 mg) by mouth daily 45 tablet 2         ALLERGIES:  Allergies   Allergen Reactions     Sulfamethoxazole-Trimethoprim Rash         PAST MEDICAL HISTORY:  Embryonal carcinoma.      PAST SURGICAL HISTORY:  [place] tooth removal.    SOCIAL HISTORY:  Social History     Socioeconomic History     Marital status: Single     Spouse name: Not on file     Number of children: Not on file     Years of education: Not on file     Highest education level: Not on file   Occupational History     Not on file   Tobacco Use     Smoking status: Former     Types: Cigarettes     Passive exposure: Never     Smokeless tobacco: Never   Substance and Sexual Activity     Alcohol use: Not on file     Drug use: Not on file     Sexual activity: Not on file   Other Topics Concern     Not on file   Social History Narrative     Not on file     Social Determinants of Health     Financial Resource Strain: Medium Risk (2/26/2024)    [facility]    Overall Financial Resource Strain (CARDIA)       Difficulty of Paying Living Expenses: Somewhat hard   Food Insecurity: No Food Insecurity (2/26/2024)    Received from [facility] P[facility]    Hunger Vital Sign      Worried About Running Out of Food in the Last Year: Never true      Ran Out of Food in the Last Year: Never true   Transportation Needs: No Transportation Needs (2/26/2024)    Received from [facility] to [facility]    Transportation Needs      In the past 12 months, has lack of transportation kept you from medical appointments, meetings, work, or from getting medicines or things needed for daily living?: No   Physical Activity: Sufficiently Active (2/26/2024)    Received from [facility]    Exercise Vital Sign      Days of Exercise per Week: 5 days      Minutes of Exercise per Session: 30 min   Stress: Stress Concern Present (2/26/2024)    Received from [facility]    [employer] [place] [employer] - Occupational Stress Questionnaire      Feeling of Stress : To some extent   Social Connections: Socially Integrated (2/26/2024)    Received from [facility]    Social Connection and Isolation Panel [NHANES]      Frequency of Communication with Friends and Family: More than three times a week      Frequency of Social Gatherings with Friends and Family: Three times a week      Attends Rastafari Services: More than 4 times per year      Active Member of Clubs or Organizations: No      Attends Club or Organization Meetings: More than 4 times per year      Marital Status: Living with partner   Interpersonal Safety: Not At Risk (3/4/2024)    Received from [facility]    Intimate Partner Violence      Are you in a relationship where you are physically hurt, threatened and/or made to feel afraid?: No   Housing Stability: Low Risk  (2/26/2024)    Received from [facility]    Housing Stability      In the last 12 months, was there a  time when you were not able to pay the mortgage or rent on time?: No      In the last 12 months, how many places have you lived?: 1      Number of Places Lived in the Last Year (Outpatient): Not on file      Number of Places Lived in the Last Year (Inpatient): Not on file      In the last 12 months, was there a time when you did not have a steady place to sleep or slept in a shelter (including now)?: No         FAMILY HISTORY:  BRCA-2 mutation on paternal side of family. Paternal great aunt with unknown type of cancer. Maternal grandparent with pancreatic cancer.    PHYSICAL EXAM:  Vital signs:  There were no vitals taken for this visit.   GENERAL: No acute distress.  EYES: No scleral icterus. No overt erythema.  RESPIRATORY: No audible cough, wheezing, or labored breathing.  MUSCULOSKELETAL: Range of motion in the neck, shoulders, and arms appear normal.  SKIN: No overt rashes, discolorations, or lesions over the face and neck.  NEUROLOGIC: Alert.  No overt tremors.  PSYCHIATRIC: Normal affect and mood.  Does not appear anxious.       LABS:  4/5/2024 [facility] labs:  WBC 4.9  Hgb 14  Platelets 223,000  Creatinine 0.64  Alk phos 51  Total bilirubin 0.3  AST 28  ALT 28  AFP = 3.1  Beta-HCG < 0.6  LDH = 208    4/19/2024:  CBC normal.  AST 40  ALT 91      ASSESSMENT/PLAN:  Jayson Banegas is a 29 year old male with the following issues:  1. Embryonal carcinoma, clinical stage IIIB, [id number], S2 (pre-orchiectomy), intermediate risk  2. 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 Diffuse abdominal, pelvic, and retroperitoneal lymphadenopathy  --10/11/2023 CT-guided biopsy of retroperitoneal lymph node confirmed an embryonal carcinoma, which is nonseminoma type.  --Stage III disease, overall prognosis with 5-year survival rate of 73%, all treatment with curative intent.  --He saw urology with Dr. Gilmore with no indication to do orchiectomy prior to chemo as no lesions were seen on prior U/S of scrotum.    --Delroy  completed sperm banking prior to chemo.  --Delroy completed 4 cycles of BEP on 1/22/2024.  -- 2/5/2024 CT C/A/P showed mildly enlarged mesenteric and retroperitoneal lymph nodes with representative left periaortic lymph node measuring 1.1 cm, previously 1.2 cm; no new lymph nodes; new interstitial infiltrates throughout the right lung, involving the upper, middle, and lower lobes. Pulmonary nodules overall stable with a right lower lobe pulmonary nodule near the diaphragm contains a punctate focus of calcification, and is unchanged in size at 12 mm; 3 mm left upper lobe pulmonary nodule is stable; no new pulmonary nodules; mildly prominent right hilar lymph nodes likely reactive.  --No clinical evidence of persistent disease with any lymph nodes > 2 cm -- therefore, RPLND/surgical resection not indicated.  --I reviewed Delroy's labs  from 4/5/2024 which showed normal CBC, CMP, LDH, AFP, and beta-HCG tumor markers.  --Continue surveillance post-chemo every 2 months year 1, every 4 months year 2, every 6 months years 3-5, annually after year 5 and check serum HCG and AFP, chest x-ray.  --Repeat CBC, CMP and tumor markers prior to each visit.    4. Restrictive lung disease  --Prior smoker.  --Denies dyspnea  --3/4/2024 PFTs showed severe restrictive lung disease with small component of obstruction; DLCO 40%.  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 He has no breathing issues at present time.    5. Tinnitus  --S/p audiology exam 2/29/2024.  --Resolved.    6. GERD  --Left upper quadrant and epigastric pain likely related to GERD.  --Continue pantoprazole.    7. Right rib pain  --Reviewed that rib and chest x-rays were negative.  --Likely had component of costochondritis.  Received course of amoxicillin and prednisone with much improvement.    8. Alcoholism  --On naltrexone  and sober since 4/11/2024.  --He is receiving psychotherapy.    9. Elevated transaminases  --Related to alcohol use.  --Now sober.  --Discussed he can have liver enzymes repeated with his PCP.  Will likely improve off alcohol.    Return in 2 months.    Heather Wick MD  [facility] Hematology/Oncology     Total time spent today: 30 minutes in chart review, patient evaluation, counseling, documentation, test and/or medication/prescription orders, and extensive coordination of care.        Again, thank you for allowing me to participate in the care of your patient.        Sincerely,        Heather Wick MD

## 2024-05-01 NOTE — NURSING NOTE
Is the patient currently in the state of [place]? YES    Visit mode:VIDEO    If the visit is dropped, the patient can be reconnected by: VIDEO VISIT: Text to cell phone:   Telephone Information:   Mobile [phone]       Will anyone else be joining the visit? NO  (If patient encounters technical issues they should call [phone]    How would you like to obtain your AVS? MyChart    Are changes needed to the allergy or medication list? No, Pt stated no changes to allergies, and Pt stated no med changes    Are refills needed on medications prescribed by this physician?     Reason for visit: VITO BARDALESF

## 2024-07-12 NOTE — PROGRESS NOTES
Virtual Visit Details    Type of service:  Video Visit (converted to phone visit due to video malfunction)    Originating Location (pt. Location): Home    Distant Location (provider location):  On-site  Platform used for Video Visit: [employer]  Video/phone visit duration: 15 minutes.    [facility], [place]    Hematology/Oncology Established Patient Note      Today's Date: 7/18/2024    Reason for visit: Embryonal carcinoma.    HISTORY OF PRESENT ILLNESS: Jayson Banegas is a 29 year old male with history of alcoholism and nicotine dependence in remission, depression, anxiety, and PTSD who presents with the following oncologic history:  1. 9/2023: Presented to PCP with left lower quadrant abdominal pain.  Given 2 weeks of antibiotics with no improvement.  2. 9/29/2023: CT abdomen/pelvis with contrast at [facility] showed new 10 mm noncalcified left lower lobe lung nodule; no effusion; diffuse mild/moderate abdominal/pelvic retroperitoneal lymphadenopathy, largest nodes 3.0 x 1.8 cm   retrocrural, 3.0 x 1.8 cm left periaortic, 2.5 x 2.3 cm left   periaortic, 4.2 x 2.2 cm left external iliac, 2.0 x 1.4 cm right internal iliac, 2.7 x 2.2 cm left internal iliac, and 2.8 x 1.7 cm  left inguinal. No right inguinal lymphadenopathy. Multiple small, mildly enlarged mesenteric root lymph nodes. Mild haziness mesenteric root and retroperitoneal fat. Right testicle now located in scrotum; left testicle not included in field of view; no bone lesions.  3. 9/29/2023: U/S of scrotum with Doppler showed no evidence for intratesticular mass; small benign right epididymal head cyst; small left varicocele. CBC normal except monocytes slightly elevated at 1.1; normal BMP; CRP slightly elevated at 1.04.  4. 10/02/2023: CT chest with contrast showed multiple indeterminate well-circumscribed solid pulmonary nodules in the right lung; 3 mm nodule anterior right upper lobe; 8 mm solid pulmonary nodule in right  middle lobe; 6 mm nodule in lateral right lower lobe; additional 10 mm right lower lobe lung nodule; no infiltrates or effusions; unchanged upper abdominal lymphadenopathy.  5. 10/11/2023: CT-guided biopsy of retroperitoneal lymph node at [facility] showed embryonal carcinoma, cannot rule out mixed type. Biopsy of 6 mm of right lung biopsy was negative for malignancy. LDH elevated at 1643. Beta-HCG = 856, AFP = 34. Biopsy specimen sent to [facility] for review of pathology.  6. 10/23/2023: Presented with chest pain.  CT chest at [facility] showed no PE. Multiple pulmonary nodules enlarged since 10/02/2023. Prior right lower lobe lung nodule 7 mm, now 13 mm, prior 8 mm right middle lobe nodule now 12 mm; prior 6 mm right lower lobe lung nodule now 12 mm; new 2 mm right lower lobe lung nodule; enlarging left upper quadrant mass arising from or engulfing left adrenal gland 2.2 x 4.6 cm; upper abdominal lymphadenopathy have enlarged.  7. 10/26/2023: PET/CT scan showed multiple hypermetabolic pulmonary nodules most likely reflecting metastatic disease. Abnormal hypermetabolic left retrocrural lymph node. Otherwise no abnormal adenopathy in the neck or chest. Diffuse hypermetabolic lymphadenopathy throughout the retroperitoneum, mesentery, and bilateral iliac chains left greater than right as well as in the left groin region.   8.  11/1/2023-1/22/2024: Completion of BEP - bleomycin, etoposide, cisplatin - x 4 cycles.  9. 2/5/2024: CT C/A/P showed mildly enlarged mesenteric and retroperitoneal lymph nodes with representative left periaortic lymph node measuring 1.1 cm, previously 1.2 cm; no new lymph nodes; new interstitial infiltrates throughout the right lung, involving the upper, middle, and lower lobes. Pulmonary nodules overall stable with a right lower lobe pulmonary nodule near the diaphragm contains a punctate focus of calcification, and is unchanged in size at 12 mm; 3 mm left upper lobe pulmonary  nodule is stable; no new pulmonary nodules; mildly prominent right hilar lymph nodes likely reactive.    INTERVAL HISTORY:  Delroy reports feeling overall very well.  Energy level very good.  His right rib pain has resolved with prednisone. He is still sober since 4/3/2024.  Once in a while he has transient right abdominal pain but this is quite fleeting.  He is eating and drinking fluid just fine.    REVIEW OF SYSTEMS:   14 point ROS was reviewed and is negative other than as noted above in HPI.       HOME MEDICATIONS:  Current Outpatient Medications   Medication Sig Dispense Refill    naltrexone (DEPADE/REVIA) 50 MG tablet Take 1 tablet (50 mg) by mouth daily 90 tablet 2    sertraline (ZOLOFT) 100 MG tablet Take 1.5 tablets (150 mg) by mouth daily 45 tablet 2         ALLERGIES:  Allergies   Allergen Reactions    Sulfamethoxazole-Trimethoprim Rash         PAST MEDICAL HISTORY:  Embryonal carcinoma.      PAST SURGICAL HISTORY:  [place] tooth removal.    SOCIAL HISTORY:  Social History     Socioeconomic History    Marital status: Single     Spouse name: Not on file    Number of children: Not on file    Years of education: Not on file    Highest education level: Not on file   Occupational History    Not on file   Tobacco Use    Smoking status: Former     Types: Cigarettes     Passive exposure: Never    Smokeless tobacco: Never   Substance and Sexual Activity    Alcohol use: Not on file    Drug use: Not on file    Sexual activity: Not on file   Other Topics Concern    Not on file   Social History Narrative    Not on file     Social Determinants of Health     Financial Resource Strain: Medium Risk (2/26/2024)    [facility] and [facility]    Overall Financial Resource Strain (CARDIA)     Difficulty of Paying Living Expenses: Somewhat hard   Food Insecurity: No Food Insecurity (2/26/2024)    Received from [facility], [facility]     Hunger Vital Sign     Worried About Running Out of Food in the Last Year: Never true     Ran Out of Food in the Last Year: Never true   Transportation Needs: No Transportation Needs (2/26/2024)    [facility]    Transportation Needs     In the past 12 months, has lack of transportation kept you from medical appointments, meetings, work, or from getting medicines or things needed for daily living?: No   Physical Activity: Sufficiently Active (2/26/2024)    [facility]    Exercise Vital Sign     Days of Exercise per Week: 5 days     Minutes of Exercise per Session: 30 min   Stress: Stress Concern Present (2/26/2024)    [facility] from [facility]    [place] [employer] - Occupational Stress Questionnaire     Feeling of Stress : To some extent   Social Connections: Socially Integrated (2/26/2024)    [facility] from [facility]    Social Connection and Isolation Panel [NHANES]     Frequency of Communication with Friends and Family: More than three times a week     Frequency of Social Gatherings with Friends and Family: Three times a week     Attends Synagogue Services: More than 4 times per year     Active Member of Clubs or Organizations: No     Attends Club or Organization Meetings: More than 4 times per year     Marital Status: Living with partner   Interpersonal Safety: Not At Risk (4/10/2024)    Received from [facility]    Intimate Partner Violence     Are you in a relationship where you are physically hurt, threatened and/or made to feel afraid?: No   Housing Stability: Low Risk  (2/26/2024)    [facility]    Housing Stability     In the last 12 months, was  "there a time when you were not able to pay the mortgage or rent on time?: No     In the last 12 months, how many places have you lived?: 1     Number of Places Lived in the Last Year (Outpatient): Not on file     Number of Places Lived in the Last Year (Inpatient): Not on file     In the last 12 months, was there a time when you did not have a steady place to sleep or slept in a shelter (including now)?: No         FAMILY HISTORY:  BRCA-2 mutation on paternal side of family. Paternal great aunt with unknown type of cancer. Maternal grandparent with pancreatic cancer.    PHYSICAL EXAM:  Vital signs:  Ht 1.753 m (5' 9\")   Wt 88.5 kg (195 lb)   BMI 28.80 kg/m     GENERAL: No acute distress.  EYES: No scleral icterus. No overt erythema.  RESPIRATORY: No audible cough, wheezing, or labored breathing.  MUSCULOSKELETAL: Range of motion in the neck, shoulders, and arms appear normal.  SKIN: No overt rashes, discolorations, or lesions over the face and neck.  NEUROLOGIC: Alert.  No overt tremors.  PSYCHIATRIC: Normal affect and mood.  Does not appear anxious.       LABS:  4/5/2024 [facility] labs:  WBC 4.9  Hgb 14  Platelets 223,000  Creatinine 0.64  Alk phos 51  Total bilirubin 0.3  AST 28  ALT 28  AFP = 3.1  Beta-HCG < 0.6  LDH = 208    7/16/2024:  CBC and CMP normal.    ASSESSMENT/PLAN:  Jayson Banegas is a 29 year old male with the following issues:  1. Embryonal carcinoma, clinical stage IIIB, [id number], S2 (pre-orchiectomy), intermediate risk  2. 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 Diffuse abdominal, pelvic, and retroperitoneal lymphadenopathy  --10/11/2023 CT-guided biopsy of retroperitoneal lymph node confirmed an embryonal carcinoma, which is nonseminoma type.  --Stage III disease, overall prognosis with 5-year survival rate of 73%, all treatment with curative intent.  --He saw urology with Dr. Gilmore with no indication to do orchiectomy prior to chemo as no lesions were seen on prior U/S of scrotum.  "   --Delroy completed sperm banking prior to chemo.  --Delroy completed 4 cycles of BEP on 1/22/2024.  -- 2/5/2024 CT C/A/P showed mildly enlarged mesenteric and retroperitoneal lymph nodes with representative left periaortic lymph node measuring 1.1 cm, previously 1.2 cm; no new lymph nodes; new interstitial infiltrates throughout the right lung, involving the upper, middle, and lower lobes. Pulmonary nodules overall stable with a right lower lobe pulmonary nodule near the diaphragm contains a punctate focus of calcification, and is unchanged in size at 12 mm; 3 mm left upper lobe pulmonary nodule is stable; no new pulmonary nodules; mildly prominent right hilar lymph nodes likely reactive.  --No clinical evidence of persistent disease with any lymph nodes > 2 cm -- therefore, RPLND/surgical resection not indicated.  --I reviewed Delroy's labs from 7/16/2024 which showed normal CBC and CMP but I do not see that LDH, AFP, and beta-HCG tumor markers were done.  Will arrange for those labs to be completed and chart message the results.  --Continue surveillance post-chemo every 2 months year 1, every 4 months year 2, every 6 months years 3-5, annually after year 5 and check serum HCG and AFP, chest x-ray.  --Repeat CBC, CMP and tumor markers prior to each visit.    4. Restrictive lung disease  --Prior smoker.  --Denies dyspnea  --3/4/2024 PFTs showed severe restrictive lung disease with small component of obstruction; DLCO 40%.  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   --He has no breathing issues at present time.    5. Tinnitus  --S/p audiology exam 2/29/2024.  --Resolved.    6. GERD  --Left upper quadrant and epigastric pain likely related to GERD.  --Continue pantoprazole.    7. Right rib pain  --Reviewed that rib and chest x-rays were negative.  --Likely had component of  costochondritis.  Received course of amoxicillin and prednisone with much improvement.    8. Alcoholism  --On naltrexone and sober since 4/11/2024.  --He is receiving psychotherapy.    9. Elevated transaminases  --Related to alcohol use.  --Now sober.  --Now resolved.    Return in 2 months. He would prefer virtual visit due to the long distance from home.    Heather Wick MD  [facility] Hematology/Oncology     Total time spent today: 30 minutes in chart review, patient evaluation, counseling, documentation, test and/or medication/prescription orders, and extensive coordination of care.      The longitudinal plan of care for the diagnosis(es)/condition(s) as documented were addressed during this visit. Due to the added complexity in care, I will continue to support Delroy in the subsequent management and with ongoing continuity of care.

## 2024-07-18 ENCOUNTER — VIRTUAL VISIT (OUTPATIENT)
Dept: ONCOLOGY | Facility: CLINIC | Age: 29
End: 2024-07-18
Attending: INTERNAL MEDICINE
Payer: COMMERCIAL

## 2024-07-18 VITALS — WEIGHT: 195 LBS | BODY MASS INDEX: 28.88 KG/M2 | HEIGHT: 69 IN

## 2024-07-18 DIAGNOSIS — C80.1 EMBRYONAL CARCINOMA (H): Primary | ICD-10-CM

## 2024-07-18 PROCEDURE — 99442 PR PHYSICIAN TELEPHONE EVALUATION 11-20 MIN: CPT | Performed by: INTERNAL MEDICINE

## 2024-07-18 RX ORDER — BUSPIRONE HYDROCHLORIDE 10 MG/1
10 TABLET ORAL 2 TIMES DAILY
COMMUNITY

## 2024-07-18 ASSESSMENT — PAIN SCALES - GENERAL: PAINLEVEL: NO PAIN (0)

## 2024-07-18 NOTE — LETTER
7/18/2024      Jayson Banegas  [address]  [address]  [facility] [address]      Dear Colleague,    Thank you for referring your patient, Jayson Banegas, to the [facility]. Please see a copy of my visit note below.    Virtual Visit Details    Type of service:  Video Visit (converted to phone visit due to video malfunction)    Originating Location (pt. Location): Home  {PROVIDER LOCATION On-site should be selected for visits conducted from your clinic location or adjoining [facility] hospital, academic office, or other nearby [facility] building. Off-site should be selected for all other provider locations, including home:[id number]}  Distant Location (provider location):  On-site  Platform used for Video Visit: [email or web address]  Video/phone visit duration: 15 minutes.    [facility]    Hematology/Oncology Established Patient Note      Today's Date: 7/18/2024    Reason for visit: Embryonal carcinoma.    HISTORY OF PRESENT ILLNESS: Jayson Banegas is a 29 year old male with history of alcoholism and nicotine dependence in remission, depression, anxiety, and PTSD who presents with the following oncologic history:  1. 9/2023: Presented to PCP with left lower quadrant abdominal pain.  Given 2 weeks of antibiotics with no improvement.  2. 9/29/2023: CT abdomen/pelvis with contrast at [facility] showed new 10 mm noncalcified left lower lobe lung nodule; no effusion; diffuse mild/moderate abdominal/pelvic retroperitoneal lymphadenopathy, largest nodes 3.0 x 1.8 cm   retrocrural, 3.0 x 1.8 cm left periaortic, 2.5 x 2.3 cm left   periaortic, 4.2 x 2.2 cm left external iliac, 2.0 x 1.4 cm right internal iliac, 2.7 x 2.2 cm left internal iliac, and 2.8 x 1.7 cm  left inguinal. No right inguinal lymphadenopathy. Multiple small, mildly enlarged mesenteric root lymph nodes. Mild haziness mesenteric root and retroperitoneal fat. Right testicle now located in scrotum; left testicle  not included in field of view; no bone lesions.  3. 9/29/2023: U/S of scrotum with Doppler showed no evidence for intratesticular mass; small benign right epididymal head cyst; small left varicocele. CBC normal except monocytes slightly elevated at 1.1; normal BMP; CRP slightly elevated at 1.04.  4. 10/02/2023: CT chest with contrast showed multiple indeterminate well-circumscribed solid pulmonary nodules in the right lung; 3 mm nodule anterior right upper lobe; 8 mm solid pulmonary nodule in right middle lobe; 6 mm nodule in lateral right lower lobe; additional 10 mm right lower lobe lung nodule; no infiltrates or effusions; unchanged upper abdominal lymphadenopathy.  5. 10/11/2023: CT-guided biopsy of retroperitoneal lymph node at [facility] showed embryonal carcinoma, cannot rule out mixed type. Biopsy of 6 mm of right lung biopsy was negative for malignancy. LDH elevated at 1643. Beta-HCG = 856, AFP = 34. Biopsy specimen sent to [facility] for review of pathology.  6. 10/23/2023: Presented with chest pain.  CT chest at [facility] showed no PE. Multiple pulmonary nodules enlarged since 10/02/2023. Prior right lower lobe lung nodule 7 mm, now 13 mm, prior 8 mm right middle lobe nodule now 12 mm; prior 6 mm right lower lobe lung nodule now 12 mm; new 2 mm right lower lobe lung nodule; enlarging left upper quadrant mass arising from or engulfing left adrenal gland 2.2 x 4.6 cm; upper abdominal lymphadenopathy have enlarged.  7. 10/26/2023: PET/CT scan showed multiple hypermetabolic pulmonary nodules most likely reflecting metastatic disease. Abnormal hypermetabolic left retrocrural lymph node. Otherwise no abnormal adenopathy in the neck or chest. Diffuse hypermetabolic lymphadenopathy throughout the retroperitoneum, mesentery, and bilateral iliac chains left greater than right as well as in the left groin region.   8.  11/1/2023-1/22/2024: Completion of BEP - bleomycin, etoposide, cisplatin - x 4  cycles.  9. 2/5/2024: CT C/A/P showed mildly enlarged mesenteric and retroperitoneal lymph nodes with representative left periaortic lymph node measuring 1.1 cm, previously 1.2 cm; no new lymph nodes; new interstitial infiltrates throughout the right lung, involving the upper, middle, and lower lobes. Pulmonary nodules overall stable with a right lower lobe pulmonary nodule near the diaphragm contains a punctate focus of calcification, and is unchanged in size at 12 mm; 3 mm left upper lobe pulmonary nodule is stable; no new pulmonary nodules; mildly prominent right hilar lymph nodes likely reactive.    INTERVAL HISTORY:  Delroy reports feeling overall very well.  Energy level very good.  His right rib pain has resolved with prednisone. He is still sober since 4/3/2024.  Once in a while he has transient right abdominal pain but this is quite fleeting.  He is eating and drinking fluid just fine.    REVIEW OF SYSTEMS:   14 point ROS was reviewed and is negative other than as noted above in HPI.       HOME MEDICATIONS:  Current Outpatient Medications   Medication Sig Dispense Refill     naltrexone (DEPADE/REVIA) 50 MG tablet Take 1 tablet (50 mg) by mouth daily 90 tablet 2     sertraline (ZOLOFT) 100 MG tablet Take 1.5 tablets (150 mg) by mouth daily 45 tablet 2         ALLERGIES:  Allergies   Allergen Reactions     Sulfamethoxazole-Trimethoprim Rash         PAST MEDICAL HISTORY:  Embryonal carcinoma.      PAST SURGICAL HISTORY:  [place] tooth removal.    SOCIAL HISTORY:  Social History     Socioeconomic History     Marital status: Single     Spouse name: Not on file     Number of children: Not on file     Years of education: Not on file     Highest education level: Not on file   Occupational History     Not on file   Tobacco Use     Smoking status: Former     Types: Cigarettes     Passive exposure: Never     Smokeless tobacco: Never   Substance and Sexual Activity     Alcohol use: Not on file     Drug use: Not on file      Sexual activity: Not on file   Other Topics Concern     Not on file   Social History Narrative     Not on file     Social Determinants of Health     Financial Resource Strain: Medium Risk (2/26/2024)    [facility]    Overall Financial Resource Strain (CARDIA)      Difficulty of Paying Living Expenses: Somewhat hard   Food Insecurity: No Food Insecurity (2/26/2024)    Received from [facility], [facility]    Hunger Vital Sign      Worried About Running Out of Food in the Last Year: Never true      Ran Out of Food in the Last Year: Never true   Transportation Needs: No Transportation Needs (2/26/2024)    Received from [facility], [facility]    Transportation Needs      In the past 12 months, has lack of transportation kept you from medical appointments, meetings, work, or from getting medicines or things needed for daily living?: No   Physical Activity: Sufficiently Active (2/26/2024)    Received from [facility], [facility]    Exercise Vital Sign      Days of Exercise per Week: 5 days      Minutes of Exercise per Session: 30 min   Stress: Stress Concern Present (2/26/2024)    Received from [facility], [facility]    [employer] [place] [employer] - Occupational Stress Questionnaire      Feeling of Stress : To some extent   Social Connections: Socially Integrated (2/26/2024)    Received from [facility], [facility]    Social Connection and Isolation Panel [NHANES]      Frequency of Communication with Friends and Family: More than three times a week      Frequency of Social Gatherings with Friends and Family: Three times a week      Attends Mu-ism Services: More than 4 times per year      Active Member of Clubs or  "Organizations: No      Attends Club or Organization Meetings: More than 4 times per year      Marital Status: Living with partner   Interpersonal Safety: Not At Risk (4/10/2024)    Received from [facility]    Intimate Partner Violence      Are you in a relationship where you are physically hurt, threatened and/or made to feel afraid?: No   Housing Stability: Low Risk  (2/26/2024)    Received from [facility], [facility]    Housing Stability      In the last 12 months, was there a time when you were not able to pay the mortgage or rent on time?: No      In the last 12 months, how many places have you lived?: 1      Number of Places Lived in the Last Year (Outpatient): Not on file      Number of Places Lived in the Last Year (Inpatient): Not on file      In the last 12 months, was there a time when you did not have a steady place to sleep or slept in a shelter (including now)?: No         FAMILY HISTORY:  BRCA-2 mutation on paternal side of family. Paternal great aunt with unknown type of cancer. Maternal grandparent with pancreatic cancer.    PHYSICAL EXAM:  Vital signs:  Ht 1.753 m (5' 9\")   Wt 88.5 kg (195 lb)   BMI 28.80 kg/m     GENERAL: No acute distress.  EYES: No scleral icterus. No overt erythema.  RESPIRATORY: No audible cough, wheezing, or labored breathing.  MUSCULOSKELETAL: Range of motion in the neck, shoulders, and arms appear normal.  SKIN: No overt rashes, discolorations, or lesions over the face and neck.  NEUROLOGIC: Alert.  No overt tremors.  PSYCHIATRIC: Normal affect and mood.  Does not appear anxious.       LABS:  4/5/2024 [facility] labs:  WBC 4.9  Hgb 14  Platelets 223,000  Creatinine 0.64  Alk phos 51  Total bilirubin 0.3  AST 28  ALT 28  AFP = 3.1  Beta-HCG < 0.6  LDH = 208    7/16/2024:  CBC and CMP normal.    ASSESSMENT/PLAN:  Jayson Banegas is a 29 year old male with the following issues:  1. 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 Diffuse abdominal, pelvic, and retroperitoneal lymphadenopathy  --10/11/2023 CT-guided biopsy of retroperitoneal lymph node confirmed an embryonal carcinoma, which is nonseminoma type.  --Stage III disease, overall prognosis with 5-year survival rate of 73%, all treatment with curative intent.  --He saw urology with Dr. Gilmore with no indication to do orchiectomy prior to chemo as no lesions were seen on prior U/S of scrotum.    --Delroy completed sperm banking prior to chemo.  --Delroy completed 4 cycles of BEP on 1/22/2024.  -- 2/5/2024 CT C/A/P showed mildly enlarged mesenteric and retroperitoneal lymph nodes with representative left periaortic lymph node measuring 1.1 cm, previously 1.2 cm; no new lymph nodes; new interstitial infiltrates throughout the right lung, involving the upper, middle, and lower lobes. Pulmonary nodules overall stable with a right lower lobe pulmonary nodule near the diaphragm contains a punctate focus of calcification, and is unchanged in size at 12 mm; 3 mm left upper lobe pulmonary nodule is stable; no new pulmonary nodules; mildly prominent right hilar lymph nodes likely reactive.  --No clinical evidence of persistent disease with any lymph nodes > 2 cm -- therefore, RPLND/surgical resection not indicated.  --I reviewed Delroy's labs from 7/16/2024 which showed normal CBC and CMP but I do not see that LDH, AFP, and beta-HCG tumor markers were done.  Will arrange for those labs to be completed and chart message the results.  --Continue surveillance post-chemo every 2 months year 1, every 4 months year 2, every 6 months years 3-5, annually after year 5 and check serum HCG and AFP, chest x-ray.  --Repeat CBC, CMP and tumor markers prior to each visit.    4. Restrictive lung disease  --Prior smoker.  --Denies dyspnea  --3/4/2024 PFTs showed severe restrictive lung disease with small  "component of obstruction; DLCO 40%.  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   --He has no breathing issues at present time.    5. Tinnitus  --S/p audiology exam 2/29/2024.  --Resolved.    6. GERD  --Left upper quadrant and epigastric pain likely related to GERD.  --Continue pantoprazole.    7. Right rib pain  --Reviewed that rib and chest x-rays were negative.  --Likely had component of costochondritis.  Received course of amoxicillin and prednisone with much improvement.    8. Alcoholism  --On naltrexone and sober since 4/11/2024.  --He is receiving psychotherapy.    9. Elevated transaminases  --Related to alcohol use.  --Now sober.  --Now resolved.    Return in 2 months. He would prefer virtual visit due to the long distance from home.    Heather Wick MD  [facility] Hematology/Oncology     Total time spent today: 30 minutes in chart review, patient evaluation, counseling, documentation, test and/or medication/prescription orders, and extensive coordination of care.      The longitudinal plan of care for the diagnosis(es)/condition(s) as documented were addressed during this visit. Due to the added complexity in care, I will continue to support Delroy in the subsequent management and with ongoing continuity of care.      Oncology Rooming Note    July 18, 2024 2:18 PM   Jayson Banegas is a 29 year old male who presents for:    Chief Complaint   Patient presents with     RECHECK     Initial Vitals: Ht 1.753 m (5' 9\")   Wt 88.5 kg (195 lb)   BMI 28.80 kg/m   Estimated body mass index is 28.8 kg/m  as calculated from the following:    Height as of this encounter: 1.753 m (5' 9\").    Weight as of this encounter: 88.5 kg (195 lb). Body surface area is 2.08 meters squared.  No Pain (0) Comment: Data Unavailable   No LMP for male patient.  Allergies reviewed: " {ALLERGIES:126020}  Medications reviewed: {MEDICATIONS:889219}    Medications: {REFILLS NEEDED:183570}  Pharmacy name entered into [facility]:    [facility] [place]  [place] - [address]  [facility] [address] - [place], [place] - [address]    Frailty Screening:   Is the patient here for a new oncology consult visit in cancer care? {Frailty screening Yes/No:984867}      Clinical concerns: *** {PROVIDER NOTIFIED?:407022}      Shari J. Schoenberger, [facility]                Again, thank you for allowing me to participate in the care of your patient.        Sincerely,        Heather Wick MD

## 2024-07-18 NOTE — LETTER
7/18/2024      Jayson Banegas  [address]  [address]  [facility] [address]      Dear Colleague,    Thank you for referring your patient, Jayson Banegas, to the [facility]. Please see a copy of my visit note below.    Virtual Visit Details    Type of service:  Video Visit (converted to phone visit due to video malfunction)    Originating Location (pt. Location): Home  {PROVIDER LOCATION On-site should be selected for visits conducted from your clinic location or adjoining [facility] hospital, academic office, or other nearby [facility] building. Off-site should be selected for all other provider locations, including home:[id number]}  Distant Location (provider location):  On-site  Platform used for Video Visit: RiverOne  Video/phone visit duration: 15 minutes.    [facility]    Hematology/Oncology Established Patient Note      Today's Date: 7/18/2024    Reason for visit: Embryonal carcinoma.    HISTORY OF PRESENT ILLNESS: Jayson Banegas is a 29 year old male with history of alcoholism and nicotine dependence in remission, depression, anxiety, and PTSD who presents with the following oncologic history:  1. 9/2023: Presented to PCP with left lower quadrant abdominal pain.  Given 2 weeks of antibiotics with no improvement.  2. 9/29/2023: CT abdomen/pelvis with contrast at [facility] showed new 10 mm noncalcified left lower lobe lung nodule; no effusion; diffuse mild/moderate abdominal/pelvic retroperitoneal lymphadenopathy, largest nodes 3.0 x 1.8 cm   retrocrural, 3.0 x 1.8 cm left periaortic, 2.5 x 2.3 cm left   periaortic, 4.2 x 2.2 cm left external iliac, 2.0 x 1.4 cm right internal iliac, 2.7 x 2.2 cm left internal iliac, and 2.8 x 1.7 cm  left inguinal. No right inguinal lymphadenopathy. Multiple small, mildly enlarged mesenteric root lymph nodes. Mild haziness mesenteric root and retroperitoneal fat. Right testicle now located in scrotum; left testicle  not included in field of view; no bone lesions.  3. 9/29/2023: U/S of scrotum with Doppler showed no evidence for intratesticular mass; small benign right epididymal head cyst; small left varicocele. CBC normal except monocytes slightly elevated at 1.1; normal BMP; CRP slightly elevated at 1.04.  4. 10/02/2023: CT chest with contrast showed multiple indeterminate well-circumscribed solid pulmonary nodules in the right lung; 3 mm nodule anterior right upper lobe; 8 mm solid pulmonary nodule in right middle lobe; 6 mm nodule in lateral right lower lobe; additional 10 mm right lower lobe lung nodule; no infiltrates or effusions; unchanged upper abdominal lymphadenopathy.  5. 10/11/2023: CT-guided biopsy of retroperitoneal lymph node at [facility] showed embryonal carcinoma, cannot rule out mixed type. Biopsy of 6 mm of right lung biopsy was negative for malignancy. LDH elevated at 1643. Beta-HCG = 856, AFP = 34. Biopsy specimen sent to [facility] for review of pathology.  6. 10/23/2023: Presented with chest pain.  CT chest at [facility] showed no PE. Multiple pulmonary nodules enlarged since 10/02/2023. Prior right lower lobe lung nodule 7 mm, now 13 mm, prior 8 mm right middle lobe nodule now 12 mm; prior 6 mm right lower lobe lung nodule now 12 mm; new 2 mm right lower lobe lung nodule; enlarging left upper quadrant mass arising from or engulfing left adrenal gland 2.2 x 4.6 cm; upper abdominal lymphadenopathy have enlarged.  7. 10/26/2023: PET/CT scan showed multiple hypermetabolic pulmonary nodules most likely reflecting metastatic disease. Abnormal hypermetabolic left retrocrural lymph node. Otherwise no abnormal adenopathy in the neck or chest. Diffuse hypermetabolic lymphadenopathy throughout the retroperitoneum, mesentery, and bilateral iliac chains left greater than right as well as in the left groin region.   8.  11/1/2023-1/22/2024: Completion of BEP - bleomycin, etoposide, cisplatin - x 4  cycles.  9. 2/5/2024: CT C/A/P showed mildly enlarged mesenteric and retroperitoneal lymph nodes with representative left periaortic lymph node measuring 1.1 cm, previously 1.2 cm; no new lymph nodes; new interstitial infiltrates throughout the right lung, involving the upper, middle, and lower lobes. Pulmonary nodules overall stable with a right lower lobe pulmonary nodule near the diaphragm contains a punctate focus of calcification, and is unchanged in size at 12 mm; 3 mm left upper lobe pulmonary nodule is stable; no new pulmonary nodules; mildly prominent right hilar lymph nodes likely reactive.    INTERVAL HISTORY:  Delroy reports feeling overall very well.  Energy level very good.  His right rib pain has resolved with prednisone. He is still sober since 4/3/2024.  Once in a while he has transient right abdominal pain but this is quite fleeting.  He is eating and drinking fluid just fine.    REVIEW OF SYSTEMS:   14 point ROS was reviewed and is negative other than as noted above in HPI.       HOME MEDICATIONS:  Current Outpatient Medications   Medication Sig Dispense Refill     naltrexone (DEPADE/REVIA) 50 MG tablet Take 1 tablet (50 mg) by mouth daily 90 tablet 2     sertraline (ZOLOFT) 100 MG tablet Take 1.5 tablets (150 mg) by mouth daily 45 tablet 2         ALLERGIES:  Allergies   Allergen Reactions     Sulfamethoxazole-Trimethoprim Rash         PAST MEDICAL HISTORY:  Embryonal carcinoma.      PAST SURGICAL HISTORY:  [place] tooth removal.    SOCIAL HISTORY:  Social History     Socioeconomic History     Marital status: Single     Spouse name: Not on file     Number of children: Not on file     Years of education: Not on file     Highest education level: Not on file   Occupational History     Not on file   Tobacco Use     Smoking status: Former     Types: Cigarettes     Passive exposure: Never     Smokeless tobacco: Never   Substance and Sexual Activity     Alcohol use: Not on file     Drug use: Not on file      Sexual activity: Not on file   Other Topics Concern     Not on file   Social History Narrative     Not on file     Social Determinants of Health     Financial Resource Strain: Medium Risk (2/26/2024)    Received from [facility], [facility]    Overall Financial Resource Strain (CARDIA)      Difficulty of Paying Living Expenses: Somewhat hard   Food Insecurity: No Food Insecurity (2/26/2024)    Received from [facility], [facility]    Hunger Vital Sign      Worried About Running Out of Food in the Last Year: Never true      Ran Out of Food in the Last Year: Never true   Transportation Needs: No Transportation Needs (2/26/2024)    Received from [facility], [facility]    Transportation Needs      In the past 12 months, has lack of transportation kept you from medical appointments, meetings, work, or from getting medicines or things needed for daily living?: No   Physical Activity: Sufficiently Active (2/26/2024)    Received from [facility], [facility]    Exercise Vital Sign      Days of Exercise per Week: 5 days      Minutes of Exercise per Session: 30 min   Stress: Stress Concern Present (2/26/2024)    Received from [facility], [facility]    [employer] [place] [employer] - Occupational Stress Questionnaire      Feeling of Stress : To some extent   Social Connections: Socially Integrated (2/26/2024)    Received from [facility], [facility]    Social Connection and Isolation Panel [NHANES]      Frequency of Communication with Friends and Family: More than three times a week      Frequency of Social Gatherings with Friends and Family: Three times a week      Attends [employer] Services: More than 4 times per year      Active Member of Clubs or  "Organizations: No      Attends Club or Organization Meetings: More than 4 times per year      Marital Status: Living with partner   Interpersonal Safety: Not At Risk (4/10/2024)    Received from [facility]    Intimate Partner Violence      Are you in a relationship where you are physically hurt, threatened and/or made to feel afraid?: No   Housing Stability: Low Risk  (2/26/2024)    Received from [facility] [employer] [facility], [facility]    Housing Stability      In the last 12 months, was there a time when you were not able to pay the mortgage or rent on time?: No      In the last 12 months, how many places have you lived?: 1      Number of Places Lived in the Last Year (Outpatient): Not on file      Number of Places Lived in the Last Year (Inpatient): Not on file      In the last 12 months, was there a time when you did not have a steady place to sleep or slept in a shelter (including now)?: No         FAMILY HISTORY:  BRCA-2 mutation on paternal side of family. Paternal great aunt with unknown type of cancer. Maternal grandparent with pancreatic cancer.    PHYSICAL EXAM:  Vital signs:  Ht 1.753 m (5' 9\")   Wt 88.5 kg (195 lb)   BMI 28.80 kg/m     GENERAL: No acute distress.  EYES: No scleral icterus. No overt erythema.  RESPIRATORY: No audible cough, wheezing, or labored breathing.  MUSCULOSKELETAL: Range of motion in the neck, shoulders, and arms appear normal.  SKIN: No overt rashes, discolorations, or lesions over the face and neck.  NEUROLOGIC: Alert.  No overt tremors.  PSYCHIATRIC: Normal affect and mood.  Does not appear anxious.       LABS:  4/5/2024 [facility] labs:  WBC 4.9  Hgb 14  Platelets 223,000  Creatinine 0.64  Alk phos 51  Total bilirubin 0.3  AST 28  ALT 28  AFP = 3.1  Beta-HCG < 0.6  LDH = 208    7/16/2024:  CBC and CMP normal.    ASSESSMENT/PLAN:  Jayson Banegas is a 29 year old male with the following issues:  1. Embryonal carcinoma, " clinical stage IIIB, [id number], S2 (pre-orchiectomy), intermediate risk  2. 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 Diffuse abdominal, pelvic, and retroperitoneal lymphadenopathy  --10/11/2023 CT-guided biopsy of retroperitoneal lymph node confirmed an embryonal carcinoma, which is nonseminoma type.  --Stage III disease, overall prognosis with 5-year survival rate of 73%, all treatment with curative intent.  --He saw urology with Dr. Gilmore with no indication to do orchiectomy prior to chemo as no lesions were seen on prior U/S of scrotum.    --Delroy completed sperm banking prior to chemo.  --Delroy completed 4 cycles of BEP on 1/22/2024.  -- 2/5/2024 CT C/A/P showed mildly enlarged mesenteric and retroperitoneal lymph nodes with representative left periaortic lymph node measuring 1.1 cm, previously 1.2 cm; no new lymph nodes; new interstitial infiltrates throughout the right lung, involving the upper, middle, and lower lobes. Pulmonary nodules overall stable with a right lower lobe pulmonary nodule near the diaphragm contains a punctate focus of calcification, and is unchanged in size at 12 mm; 3 mm left upper lobe pulmonary nodule is stable; no new pulmonary nodules; mildly prominent right hilar lymph nodes likely reactive.  --No clinical evidence of persistent disease with any lymph nodes > 2 cm -- therefore, RPLND/surgical resection not indicated.  --I reviewed Delroy's labs from 7/16/2024 which showed normal CBC and CMP but I do not see that LDH, AFP, and beta-HCG tumor markers were done.  Will arrange for those labs to be completed and chart message the results.  --Continue surveillance post-chemo every 2 months year 1, every 4 months year 2, every 6 months years 3-5, annually after year 5 and check serum HCG and AFP, chest x-ray.  --Repeat CBC, CMP and tumor markers prior to each visit.    4. Restrictive lung disease  --Prior smoker.  --Denies dyspnea  --3/4/2024 PFTs showed severe restrictive lung disease with small  "component of obstruction; DLCO 40%.  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   --He has no breathing issues at present time.    5. Tinnitus  --S/p audiology exam 2/29/2024.  --Resolved.    6. GERD  --Left upper quadrant and epigastric pain likely related to GERD.  --Continue pantoprazole.    7. Right rib pain  --Reviewed that rib and chest x-rays were negative.  --Likely had component of costochondritis.  Received course of amoxicillin and prednisone with much improvement.    8. Alcoholism  --On naltrexone and sober since 4/11/2024.  --He is receiving psychotherapy.    9. Elevated transaminases  --Related to alcohol use.  --Now sober.  --Now resolved.    Return in 2 months. He would prefer virtual visit due to the long distance from home.    Heather Wick MD  [facility] Hematology/Oncology     Total time spent today: 30 minutes in chart review, patient evaluation, counseling, documentation, test and/or medication/prescription orders, and extensive coordination of care.      The longitudinal plan of care for the diagnosis(es)/condition(s) as documented were addressed during this visit. Due to the added complexity in care, I will continue to support Delroy in the subsequent management and with ongoing continuity of care.      Oncology Rooming Note    July 18, 2024 2:18 PM   Jayson Banegas is a 29 year old male who presents for:    Chief Complaint   Patient presents with     RECHECK     Initial Vitals: Ht 1.753 m (5' 9\")   Wt 88.5 kg (195 lb)   BMI 28.80 kg/m   Estimated body mass index is 28.8 kg/m  as calculated from the following:    Height as of this encounter: 1.753 m (5' 9\").    Weight as of this encounter: 88.5 kg (195 lb). Body surface area is 2.08 meters squared.  No Pain (0) Comment: Data Unavailable   No LMP for male patient.  Allergies reviewed: " {ALLERGIES:291538}  Medications reviewed: {MEDICATIONS:353111}    Medications: {REFILLS NEEDED:549915}  Pharmacy name entered into [facility]:    [facility] [place] - [address] [facility] [facility] [address]  [facility] - [place], [place] - [address]    Frailty Screening:   Is the patient here for a new oncology consult visit in cancer care? {Frailty screening Yes/No:690837}      Clinical concerns: *** {PROVIDER NOTIFIED?:322659}      Shari J. Schoenberger, [facility]                Again, thank you for allowing me to participate in the care of your patient.        Sincerely,        Heather Wick MD

## 2024-07-18 NOTE — PROGRESS NOTES
"Oncology Rooming Note    July 18, 2024 2:18 PM   Jayson Banegas is a 29 year old male who presents for:    Chief Complaint   Patient presents with    RECHECK     Initial Vitals: Ht 1.753 m (5' 9\")   Wt 88.5 kg (195 lb)   BMI 28.80 kg/m   Estimated body mass index is 28.8 kg/m  as calculated from the following:    Height as of this encounter: 1.753 m (5' 9\").    Weight as of this encounter: 88.5 kg (195 lb). Body surface area is 2.08 meters squared.  No Pain (0) Comment: Data Unavailable   No LMP for male patient.  Allergies reviewed: Yes  Medications reviewed: Yes    Medications: Medication refills not needed today.  Pharmacy name entered into [facility]:    [facility], [place] - [address] - [address]    Frailty Screening:   Is the patient here for a new oncology consult visit in cancer care? 2. No      Clinical concerns:     was notified.      Shari J. Schoenberger, JOCELYN              "

## 2024-07-18 NOTE — PATIENT INSTRUCTIONS
1. Arrange for local lab draw at [facility] for LDH, AFP, beta-HCG tumor markers.  2. RTC MD (virtual visit) in 2 months with local lab draw at [facility] for CBC, CMP, LDH, AFP, beta-HCG tumor markers 1 week prior to visit.

## 2024-07-22 ENCOUNTER — TELEPHONE (OUTPATIENT)
Dept: ONCOLOGY | Facility: CLINIC | Age: 29
End: 2024-07-22
Payer: COMMERCIAL

## 2024-07-22 NOTE — TELEPHONE ENCOUNTER
Lab orders faxed to [facility] with receipt confirmation via [facility].    Tierney Santo RN

## 2024-07-22 NOTE — TELEPHONE ENCOUNTER
Betty at [facility] is calling.  States that pt would like to come to their clinic this morning for labs.  However, pt told them that he needed to have tumor markers done, and they do not have any orders for this.    Per chart review, pt met with Dr Wick on 7/18/2024 and new lab orders have been entered.    Will route to [facility] to fax lab orders to [phone].    Connie Molina RN on 7/22/2024 at 9:04 AM

## 2024-09-27 NOTE — PROGRESS NOTES
Virtual Visit Details    Type of service:  Video Visit     Originating Location (pt. Location): Home    Distant Location (provider location):  On-site  Platform used for Video Visit: [facility]    Hematology/Oncology Established Patient Note      Today's Date: 10/2/2024    Reason for visit: Embryonal carcinoma.    HISTORY OF PRESENT ILLNESS: Jayson Banegas is a 29 year old male with history of alcoholism and nicotine dependence in remission, depression, anxiety, and PTSD who presents with the following oncologic history:  1. 9/2023: Presented to PCP with left lower quadrant abdominal pain.  Given 2 weeks of antibiotics with no improvement.  2. 9/29/2023: CT abdomen/pelvis with contrast at [facility] showed new 10 mm noncalcified left lower lobe lung nodule; no effusion; diffuse mild/moderate abdominal/pelvic retroperitoneal lymphadenopathy, largest nodes 3.0 x 1.8 cm   retrocrural, 3.0 x 1.8 cm left periaortic, 2.5 x 2.3 cm left   periaortic, 4.2 x 2.2 cm left external iliac, 2.0 x 1.4 cm right internal iliac, 2.7 x 2.2 cm left internal iliac, and 2.8 x 1.7 cm  left inguinal. No right inguinal lymphadenopathy. Multiple small, mildly enlarged mesenteric root lymph nodes. Mild haziness mesenteric root and retroperitoneal fat. Right testicle now located in scrotum; left testicle not included in field of view; no bone lesions.  3. 9/29/2023: U/S of scrotum with Doppler showed no evidence for intratesticular mass; small benign right epididymal head cyst; small left varicocele. CBC normal except monocytes slightly elevated at 1.1; normal BMP; CRP slightly elevated at 1.04.  4. 10/02/2023: CT chest with contrast showed multiple indeterminate well-circumscribed solid pulmonary nodules in the right lung; 3 mm nodule anterior right upper lobe; 8 mm solid pulmonary nodule in right middle lobe; 6 mm nodule in lateral right lower lobe; additional 10 mm right lower lobe lung  nodule; no infiltrates or effusions; unchanged upper abdominal lymphadenopathy.  5. 10/11/2023: CT-guided biopsy of retroperitoneal lymph node at [facility] showed embryonal carcinoma, cannot rule out mixed type. Biopsy of 6 mm of right lung biopsy was negative for malignancy. LDH elevated at 1643. Beta-HCG = 856, AFP = 34. Biopsy specimen sent to [facility] for review of pathology.  6. 10/23/2023: Presented with chest pain.  CT chest at [facility] showed no PE. Multiple pulmonary nodules enlarged since 10/02/2023. Prior right lower lobe lung nodule 7 mm, now 13 mm, prior 8 mm right middle lobe nodule now 12 mm; prior 6 mm right lower lobe lung nodule now 12 mm; new 2 mm right lower lobe lung nodule; enlarging left upper quadrant mass arising from or engulfing left adrenal gland 2.2 x 4.6 cm; upper abdominal lymphadenopathy have enlarged.  7. 10/26/2023: PET/CT scan showed multiple hypermetabolic pulmonary nodules most likely reflecting metastatic disease. Abnormal hypermetabolic left retrocrural lymph node. Otherwise no abnormal adenopathy in the neck or chest. Diffuse hypermetabolic lymphadenopathy throughout the retroperitoneum, mesentery, and bilateral iliac chains left greater than right as well as in the left groin region.   8.  11/1/2023-1/22/2024: Completion of BEP - bleomycin, etoposide, cisplatin - x 4 cycles.  9. 2/5/2024: CT C/A/P showed mildly enlarged mesenteric and retroperitoneal lymph nodes with representative left periaortic lymph node measuring 1.1 cm, previously 1.2 cm; no new lymph nodes; new interstitial infiltrates throughout the right lung, involving the upper, middle, and lower lobes. Pulmonary nodules overall stable with a right lower lobe pulmonary nodule near the diaphragm contains a punctate focus of calcification, and is unchanged in size at 12 mm; 3 mm left upper lobe pulmonary nodule is stable; no new pulmonary nodules; mildly prominent right hilar lymph nodes likely  reactive.    INTERVAL HISTORY:  Delroy reports feeling very well with excellent energy level and no pain.       REVIEW OF SYSTEMS:   14 point ROS was reviewed and is negative other than as noted above in HPI.       HOME MEDICATIONS:  Current Outpatient Medications   Medication Sig Dispense Refill    busPIRone (BUSPAR) 10 MG tablet Take 10 mg by mouth 2 times daily      naltrexone (DEPADE/REVIA) 50 MG tablet Take 1 tablet (50 mg) by mouth daily 90 tablet 2    sertraline (ZOLOFT) 100 MG tablet Take 1.5 tablets (150 mg) by mouth daily (Patient taking differently: Take 100 mg by mouth daily) 45 tablet 2         ALLERGIES:  Allergies   Allergen Reactions    Sulfamethoxazole-Trimethoprim Rash         PAST MEDICAL HISTORY:  Embryonal carcinoma.      PAST SURGICAL HISTORY:  [place] tooth removal.    SOCIAL HISTORY:  Social History     Socioeconomic History    Marital status: Single     Spouse name: Not on file    Number of children: Not on file    Years of education: Not on file    Highest education level: Not on file   Occupational History    Not on file   Tobacco Use    Smoking status: Former     Types: Cigarettes     Passive exposure: Never    Smokeless tobacco: Never   Substance and Sexual Activity    Alcohol use: Not on file    Drug use: Not on file    Sexual activity: Not on file   Other Topics Concern    Not on file   Social History Narrative    Not on file     Social Determinants of Health     Financial Resource Strain: Medium Risk (2/26/2024)    [facility] from [facility]    Overall Financial Resource Strain (CARDIA)     Difficulty of Paying Living Expenses: Somewhat hard   Food Insecurity: No Food Insecurity (2/26/2024)    [facility] [place] [facility]    Hunger Vital Sign     Worried About Running Out of Food in the Last Year: Never true     Ran Out of Food in the Last Year: Never true   Transportation Needs: No  Transportation Needs (2/26/2024)    [facility] from [facility]    Transportation Needs     In the past 12 months, has lack of transportation kept you from medical appointments, meetings, work, or from getting medicines or things needed for daily living?: No   Physical Activity: Sufficiently Active (2/26/2024)    [facility], [facility]    Exercise Vital Sign     Days of Exercise per Week: 5 days     Minutes of Exercise per Session: 30 min   Stress: Stress Concern Present (2/26/2024)    [facility], [facility]    [employer] [place] [employer] - Occupational Stress Questionnaire     Feeling of Stress : To some extent   Social Connections: Socially Integrated (2/26/2024)    Received from [facility], [facility]    Social Connection and Isolation Panel [NHANES]     Frequency of Communication with Friends and Family: More than three times a week     Frequency of Social Gatherings with Friends and Family: Three times a week     Attends Taoist Services: More than 4 times per year     Active Member of Clubs or Organizations: No     Attends Club or Organization Meetings: More than 4 times per year     Marital Status: Living with partner   Interpersonal Safety: Not At Risk (4/10/2024)    Received from [facility]    Intimate Partner Violence     Are you in a relationship where you are physically hurt, threatened and/or made to feel afraid?: No   Housing Stability: Unknown (2/26/2024)    Received from [facility]    Housing Stability Vital Sign     Unable to Pay for Housing in the Last Year: No     Number of Times Moved in the Last Year: Not on file     Homeless in the Last Year: Not on file         FAMILY HISTORY:  BRCA-2 mutation on paternal side of family.  "Paternal great aunt with unknown type of cancer. Maternal grandparent with pancreatic cancer.    PHYSICAL EXAM:  Vital signs:  /72   Ht 1.753 m (5' 9\")   Wt 87.5 kg (193 lb)   BMI 28.50 kg/m     GENERAL: No acute distress.  EYES: No scleral icterus. No overt erythema.  RESPIRATORY: No audible cough, wheezing, or labored breathing.  MUSCULOSKELETAL: Range of motion in the neck, shoulders, and arms appear normal.  SKIN: No overt rashes, discolorations, or lesions over the face and neck.  NEUROLOGIC: Alert.  No overt tremors.  PSYCHIATRIC: Normal affect and mood.  Does not appear anxious.       LABS:  9/30/2024 [facility] labs:  WBC 8.1  Hgb 14.4  Platelets 260,000  Creatinine 0.76  Alk phos 51  Total bilirubin 0.3  AST 22  ALT 20  AFP = 1.7  Beta-HCG < 0.6  LDH = 167    ASSESSMENT/PLAN:  Jayson Banegas is a 29 year old male with the following issues:  1. Embryonal carcinoma, clinical stage IIIB, [id number], S2 (pre-orchiectomy), intermediate risk  2. 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 Diffuse abdominal, pelvic, and retroperitoneal lymphadenopathy  --10/11/2023 CT-guided biopsy of retroperitoneal lymph node confirmed an embryonal carcinoma, which is nonseminoma type.  --Stage III disease, overall prognosis with 5-year survival rate of 73%, all treatment with curative intent.  --He saw urology with Dr. Gilmore with no indication to do orchiectomy prior to chemo as no lesions were seen on prior U/S of scrotum.    --Delroy completed sperm banking prior to chemo.  --Delroy completed 4 cycles of BEP on 1/22/2024.  -- 2/5/2024 CT C/A/P showed mildly enlarged mesenteric and retroperitoneal lymph nodes with representative left periaortic lymph node measuring 1.1 cm, previously 1.2 cm; no new lymph nodes; new interstitial infiltrates throughout the right lung, involving the upper, middle, and lower lobes. Pulmonary nodules overall stable with a right lower lobe pulmonary nodule near the diaphragm contains a " punctate focus of calcification, and is unchanged in size at 12 mm; 3 mm left upper lobe pulmonary nodule is stable; no new pulmonary nodules; mildly prominent right hilar lymph nodes likely reactive.  --No clinical evidence of persistent disease with any lymph nodes > 2 cm -- therefore, RPLND/surgical resection not indicated.  --I reviewed Delroy's labs from which showed normal CBC and CMP; LDH, AFP, and beta-HCG all normal.  --Continue surveillance post-chemo every 2 months year 1, every 4 months year 2, every 6 months years 3-5, annually after year 5 and check serum HCG and AFP, chest x-ray. Reiterated this schedule.  --Repeat CBC, CMP and tumor markers prior to each visit.    2. Alcoholism, in remission  --On naltrexone and sober since 4/11/2024.  --He is receiving psychotherapy.    Return every 2 months for next 2 visits then every 4 months for year 2. He would prefer virtual visit due to the long distance from home.    Heather Wick MD  [facility] Hematology/Oncology     Total time spent today: 30 minutes in chart review, patient evaluation, counseling, documentation, test and/or medication/prescription orders, and extensive coordination of care.      The longitudinal plan of care for the diagnosis(es)/condition(s) as documented were addressed during this visit. Due to the added complexity in care, I will continue to support Delroy in the subsequent management and with ongoing continuity of care.

## 2024-09-30 ENCOUNTER — TELEPHONE (OUTPATIENT)
Dept: ONCOLOGY | Facility: CLINIC | Age: 29
End: 2024-09-30
Payer: COMMERCIAL

## 2024-09-30 ENCOUNTER — DOCUMENTATION ONLY (OUTPATIENT)
Dept: ONCOLOGY | Facility: CLINIC | Age: 29
End: 2024-09-30
Payer: COMMERCIAL

## 2024-09-30 NOTE — NURSING NOTE
Lab orders faxed to [facility] per pt request. Receipt confirmation via Right Fax.    Tierney Santo RN

## 2024-09-30 NOTE — TELEPHONE ENCOUNTER
Pt is calling.  He was at [facility] this morning for lab appt, but they did not have any lab orders from Dr Wick.  Pt is requesting that lab orders are faxed to [facility] at [phone] as soon as possible.  Pt would like to schedule lab appt for this afternoon or tomorrow morning in [place]. He has a virtual appt with Dr Wikc on 10/2/2024.    Will route to [facility] for faxing orders.    Pt is requesting call back when orders have been faxed. OK to leave message if unable to reach pt.  Connie Molina RN on 9/30/2024 at 10:56 AM

## 2024-10-02 ENCOUNTER — VIRTUAL VISIT (OUTPATIENT)
Dept: ONCOLOGY | Facility: CLINIC | Age: 29
End: 2024-10-02
Attending: INTERNAL MEDICINE
Payer: COMMERCIAL

## 2024-10-02 VITALS
BODY MASS INDEX: 28.58 KG/M2 | HEIGHT: 69 IN | WEIGHT: 193 LBS | SYSTOLIC BLOOD PRESSURE: 120 MMHG | DIASTOLIC BLOOD PRESSURE: 72 MMHG

## 2024-10-02 DIAGNOSIS — C80.1 EMBRYONAL CARCINOMA (H): Primary | ICD-10-CM

## 2024-10-02 PROCEDURE — 99214 OFFICE O/P EST MOD 30 MIN: CPT | Mod: 95 | Performed by: INTERNAL MEDICINE

## 2024-10-02 PROCEDURE — G2211 COMPLEX E/M VISIT ADD ON: HCPCS | Mod: 95 | Performed by: INTERNAL MEDICINE

## 2024-10-02 ASSESSMENT — PAIN SCALES - GENERAL: PAINLEVEL: NO PAIN (0)

## 2024-10-02 NOTE — NURSING NOTE
Current patient location: [address]  [address]  [place] [address]    Is the patient currently in the state of [place]? YES    Visit mode:VIDEO    If the visit is dropped, the patient can be reconnected by: VIDEO VISIT: Text to cell phone:   Telephone Information:   Mobile [phone]       Will anyone else be joining the visit? NO  (If patient encounters technical issues they should call [phone]    Are changes needed to the allergy or medication list? Pt stated no changes to allergies and Pt stated no med changes    Are refills needed on medications prescribed by this physician? NO    Rooming Documentation:  Questionnaire(s) completed    Reason for visit: VITO HERNANDEZ

## 2024-10-02 NOTE — PATIENT INSTRUCTIONS
1. Lab draw every 2 months x 2 at [facility] in [place] prior to each MD visit.  2. RTC MD visit every 2 months x 2.  3. Arrange for chest x-ray in [place] in January 2025.

## 2024-10-02 NOTE — LETTER
10/2/2024      Jayson Banegas  [address]  [address]  [facility] [address]      Dear Colleague,    Thank you for referring your patient, Jayson Banegas, to the [facility] CANCER [facility]. Please see a copy of my visit note below.    Virtual Visit Details    Type of service:  Video Visit     Originating Location (pt. Location): Home    Distant Location (provider location):  On-site  Platform used for Video Visit: [facility]    Hematology/Oncology Established Patient Note      Today's Date: 10/2/2024    Reason for visit: Embryonal carcinoma.    HISTORY OF PRESENT ILLNESS: Jayson Banegas is a 29 year old male with history of alcoholism and nicotine dependence in remission, depression, anxiety, and PTSD who presents with the following oncologic history:  1. 9/2023: Presented to PCP with left lower quadrant abdominal pain.  Given 2 weeks of antibiotics with no improvement.  2. 9/29/2023: CT abdomen/pelvis with contrast at [facility] showed new 10 mm noncalcified left lower lobe lung nodule; no effusion; diffuse mild/moderate abdominal/pelvic retroperitoneal lymphadenopathy, largest nodes 3.0 x 1.8 cm   retrocrural, 3.0 x 1.8 cm left periaortic, 2.5 x 2.3 cm left   periaortic, 4.2 x 2.2 cm left external iliac, 2.0 x 1.4 cm right internal iliac, 2.7 x 2.2 cm left internal iliac, and 2.8 x 1.7 cm  left inguinal. No right inguinal lymphadenopathy. Multiple small, mildly enlarged mesenteric root lymph nodes. Mild haziness mesenteric root and retroperitoneal fat. Right testicle now located in scrotum; left testicle not included in field of view; no bone lesions.  3. 9/29/2023: U/S of scrotum with Doppler showed no evidence for intratesticular mass; small benign right epididymal head cyst; small left varicocele. CBC normal except monocytes slightly elevated at 1.1; normal BMP; CRP slightly elevated at 1.04.  4. 10/02/2023: CT chest with contrast showed  multiple indeterminate well-circumscribed solid pulmonary nodules in the right lung; 3 mm nodule anterior right upper lobe; 8 mm solid pulmonary nodule in right middle lobe; 6 mm nodule in lateral right lower lobe; additional 10 mm right lower lobe lung nodule; no infiltrates or effusions; unchanged upper abdominal lymphadenopathy.  5. 10/11/2023: CT-guided biopsy of retroperitoneal lymph node at [facility] showed embryonal carcinoma, cannot rule out mixed type. Biopsy of 6 mm of right lung biopsy was negative for malignancy. LDH elevated at 1643. Beta-HCG = 856, AFP = 34. Biopsy specimen sent to [facility] for review of pathology.  6. 10/23/2023: Presented with chest pain.  CT chest at [facility] showed no PE. Multiple pulmonary nodules enlarged since 10/02/2023. Prior right lower lobe lung nodule 7 mm, now 13 mm, prior 8 mm right middle lobe nodule now 12 mm; prior 6 mm right lower lobe lung nodule now 12 mm; new 2 mm right lower lobe lung nodule; enlarging left upper quadrant mass arising from or engulfing left adrenal gland 2.2 x 4.6 cm; upper abdominal lymphadenopathy have enlarged.  7. 10/26/2023: PET/CT scan showed multiple hypermetabolic pulmonary nodules most likely reflecting metastatic disease. Abnormal hypermetabolic left retrocrural lymph node. Otherwise no abnormal adenopathy in the neck or chest. Diffuse hypermetabolic lymphadenopathy throughout the retroperitoneum, mesentery, and bilateral iliac chains left greater than right as well as in the left groin region.   8.  11/1/2023-1/22/2024: Completion of BEP - bleomycin, etoposide, cisplatin - x 4 cycles.  9. 2/5/2024: CT C/A/P showed mildly enlarged mesenteric and retroperitoneal lymph nodes with representative left periaortic lymph node measuring 1.1 cm, previously 1.2 cm; no new lymph nodes; new interstitial infiltrates throughout the right lung, involving the upper, middle, and lower lobes. Pulmonary nodules overall stable with a right  lower lobe pulmonary nodule near the diaphragm contains a punctate focus of calcification, and is unchanged in size at 12 mm; 3 mm left upper lobe pulmonary nodule is stable; no new pulmonary nodules; mildly prominent right hilar lymph nodes likely reactive.    INTERVAL HISTORY:  Delroy reports feeling very well with excellent energy level and no pain.       REVIEW OF SYSTEMS:   14 point ROS was reviewed and is negative other than as noted above in HPI.       HOME MEDICATIONS:  Current Outpatient Medications   Medication Sig Dispense Refill     busPIRone (BUSPAR) 10 MG tablet Take 10 mg by mouth 2 times daily       naltrexone (DEPADE/REVIA) 50 MG tablet Take 1 tablet (50 mg) by mouth daily 90 tablet 2     sertraline (ZOLOFT) 100 MG tablet Take 1.5 tablets (150 mg) by mouth daily (Patient taking differently: Take 100 mg by mouth daily) 45 tablet 2         ALLERGIES:  Allergies   Allergen Reactions     Sulfamethoxazole-Trimethoprim Rash         PAST MEDICAL HISTORY:  Embryonal carcinoma.      PAST SURGICAL HISTORY:  [place] tooth removal.    SOCIAL HISTORY:  Social History     Socioeconomic History     Marital status: Single     Spouse name: Not on file     Number of children: Not on file     Years of education: Not on file     Highest education level: Not on file   Occupational History     Not on file   Tobacco Use     Smoking status: Former     Types: Cigarettes     Passive exposure: Never     Smokeless tobacco: Never   Substance and Sexual Activity     Alcohol use: Not on file     Drug use: Not on file     Sexual activity: Not on file   Other Topics Concern     Not on file   Social History Narrative     Not on file     Social Determinants of Health     Financial Resource Strain: Medium Risk (2/26/2024)    [facility]    Overall Financial Resource Strain (CARDIA)      Difficulty of Paying Living Expenses: Somewhat hard   Food Insecurity: No Food  Insecurity (2/26/2024)    Received from [facility], [facility]    Hunger Vital Sign      Worried About Running Out of Food in the Last Year: Never true      Ran Out of Food in the Last Year: Never true   Transportation Needs: No Transportation Needs (2/26/2024)    Received from [facility], [facility]    Transportation Needs      In the past 12 months, has lack of transportation kept you from medical appointments, meetings, work, or from getting medicines or things needed for daily living?: No   Physical Activity: Sufficiently Active (2/26/2024)    Received from [facility], [facility]    Exercise Vital Sign      Days of Exercise per Week: 5 days      Minutes of Exercise per Session: 30 min   Stress: Stress Concern Present (2/26/2024)    Received from [facility], [facility]    [place] [place] [employer] - Occupational Stress Questionnaire      Feeling of Stress : To some extent   Social Connections: Socially Integrated (2/26/2024)    Received from [facility], [facility]    Social Connection and Isolation Panel [NHANES]      Frequency of Communication with Friends and Family: More than three times a week      Frequency of Social Gatherings with Friends and Family: Three times a week      Attends Yazidi Services: More than 4 times per year      Active Member of Clubs or Organizations: No      Attends Club or Organization Meetings: More than 4 times per year      Marital Status: Living with partner   Interpersonal Safety: Not At Risk (4/10/2024)    Received from [facility]    Intimate Partner Violence      Are you in a relationship where you are physically hurt, threatened and/or made to feel afraid?: No   Housing Stability: Unknown (2/26/2024)    [facility]  [facility]    Housing Stability Vital Sign      Unable to Pay for Housing in the Last Year: No      Number of Times Moved in the Last Year: Not on file      Homeless in the Last Year: Not on file         FAMILY HISTORY:  BRCA-2 mutation on paternal side of family. Paternal great aunt with unknown type of cancer. Maternal grandparent with pancreatic cancer.    PHYSICAL EXAM:  Vital signs:  /72   Ht 1.753 m (5' 9\")   Wt 87.5 kg (193 lb)   BMI 28.50 kg/m     GENERAL: No acute distress.  EYES: No scleral icterus. No overt erythema.  RESPIRATORY: No audible cough, wheezing, or labored breathing.  MUSCULOSKELETAL: Range of motion in the neck, shoulders, and arms appear normal.  SKIN: No overt rashes, discolorations, or lesions over the face and neck.  NEUROLOGIC: Alert.  No overt tremors.  PSYCHIATRIC: Normal affect and mood.  Does not appear anxious.       LABS:  9/30/2024 [facility] labs:  WBC 8.1  Hgb 14.4  Platelets 260,000  Creatinine 0.76  Alk phos 51  Total bilirubin 0.3  AST 22  ALT 20  AFP = 1.7  Beta-HCG < 0.6  LDH = 167    ASSESSMENT/PLAN:  Jayson Banegas is a 29 year old male with the following issues:  1. Embryonal carcinoma, clinical stage IIIB, [id number], S2 (pre-orchiectomy), intermediate risk  2. 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 Diffuse abdominal, pelvic, and retroperitoneal lymphadenopathy  --10/11/2023 CT-guided biopsy of retroperitoneal lymph node confirmed an embryonal carcinoma, which is nonseminoma type.  --Stage III disease, overall prognosis with 5-year survival rate of 73%, all treatment with curative intent.  --He saw urology with Dr. Gilmore with no indication to do orchiectomy prior to chemo as no lesions were seen on prior U/S of scrotum.    --Delroy completed sperm banking prior to chemo.  --Delroy completed 4 cycles of BEP on 1/22/2024.  -- 2/5/2024 CT C/A/P showed mildly enlarged mesenteric and retroperitoneal lymph nodes with representative left " periaortic lymph node measuring 1.1 cm, previously 1.2 cm; no new lymph nodes; new interstitial infiltrates throughout the right lung, involving the upper, middle, and lower lobes. Pulmonary nodules overall stable with a right lower lobe pulmonary nodule near the diaphragm contains a punctate focus of calcification, and is unchanged in size at 12 mm; 3 mm left upper lobe pulmonary nodule is stable; no new pulmonary nodules; mildly prominent right hilar lymph nodes likely reactive.  --No clinical evidence of persistent disease with any lymph nodes > 2 cm -- therefore, RPLND/surgical resection not indicated.  --I reviewed Delroy's labs from which showed normal CBC and CMP; LDH, AFP, and beta-HCG all normal.  --Continue surveillance post-chemo every 2 months year 1, every 4 months year 2, every 6 months years 3-5, annually after year 5 and check serum HCG and AFP, chest x-ray. Reiterated this schedule.  --Repeat CBC, CMP and tumor markers prior to each visit.    2. Alcoholism, in remission  --On naltrexone and sober since 4/11/2024.  --He is receiving psychotherapy.    Return every 2 months for next 2 visits then every 4 months for year 2. He would prefer virtual visit due to the long distance from home.    Heather Wick MD  [facility] Hematology/Oncology     Total time spent today: 30 minutes in chart review, patient evaluation, counseling, documentation, test and/or medication/prescription orders, and extensive coordination of care.      The longitudinal plan of care for the diagnosis(es)/condition(s) as documented were addressed during this visit. Due to the added complexity in care, I will continue to support Delroy in the subsequent management and with ongoing continuity of care.      Again, thank you for allowing me to participate in the care of your patient.        Sincerely,        Heather Wick MD

## 2024-10-02 NOTE — LETTER
10/2/2024      Jayson Banegas  [address]  [address]  [facility] [address]      Dear Colleague,    Thank you for referring your patient, Jayson Banegas, to the [facility] CANCER [facility]. Please see a copy of my visit note below.    Virtual Visit Details    Type of service:  Video Visit     Originating Location (pt. Location): Home    Distant Location (provider location):  On-site  Platform used for Video Visit: [facility] [place]    Hematology/Oncology Established Patient Note      Today's Date: 10/2/2024    Reason for visit: Embryonal carcinoma.    HISTORY OF PRESENT ILLNESS: Jayson Banegas is a 29 year old male with history of alcoholism and nicotine dependence in remission, depression, anxiety, and PTSD who presents with the following oncologic history:  1. 9/2023: Presented to PCP with left lower quadrant abdominal pain.  Given 2 weeks of antibiotics with no improvement.  2. 9/29/2023: CT abdomen/pelvis with contrast at [facility] showed new 10 mm noncalcified left lower lobe lung nodule; no effusion; diffuse mild/moderate abdominal/pelvic retroperitoneal lymphadenopathy, largest nodes 3.0 x 1.8 cm   retrocrural, 3.0 x 1.8 cm left periaortic, 2.5 x 2.3 cm left   periaortic, 4.2 x 2.2 cm left external iliac, 2.0 x 1.4 cm right internal iliac, 2.7 x 2.2 cm left internal iliac, and 2.8 x 1.7 cm  left inguinal. No right inguinal lymphadenopathy. Multiple small, mildly enlarged mesenteric root lymph nodes. Mild haziness mesenteric root and retroperitoneal fat. Right testicle now located in scrotum; left testicle not included in field of view; no bone lesions.  3. 9/29/2023: U/S of scrotum with Doppler showed no evidence for intratesticular mass; small benign right epididymal head cyst; small left varicocele. CBC normal except monocytes slightly elevated at 1.1; normal BMP; CRP slightly elevated at 1.04.  4. 10/02/2023: CT chest with contrast showed  multiple indeterminate well-circumscribed solid pulmonary nodules in the right lung; 3 mm nodule anterior right upper lobe; 8 mm solid pulmonary nodule in right middle lobe; 6 mm nodule in lateral right lower lobe; additional 10 mm right lower lobe lung nodule; no infiltrates or effusions; unchanged upper abdominal lymphadenopathy.  5. 10/11/2023: CT-guided biopsy of retroperitoneal lymph node at [facility] showed embryonal carcinoma, cannot rule out mixed type. Biopsy of 6 mm of right lung biopsy was negative for malignancy. LDH elevated at 1643. Beta-HCG = 856, AFP = 34. Biopsy specimen sent to [facility] for review of pathology.  6. 10/23/2023: Presented with chest pain.  CT chest at [facility] showed no PE. Multiple pulmonary nodules enlarged since 10/02/2023. Prior right lower lobe lung nodule 7 mm, now 13 mm, prior 8 mm right middle lobe nodule now 12 mm; prior 6 mm right lower lobe lung nodule now 12 mm; new 2 mm right lower lobe lung nodule; enlarging left upper quadrant mass arising from or engulfing left adrenal gland 2.2 x 4.6 cm; upper abdominal lymphadenopathy have enlarged.  7. 10/26/2023: PET/CT scan showed multiple hypermetabolic pulmonary nodules most likely reflecting metastatic disease. Abnormal hypermetabolic left retrocrural lymph node. Otherwise no abnormal adenopathy in the neck or chest. Diffuse hypermetabolic lymphadenopathy throughout the retroperitoneum, mesentery, and bilateral iliac chains left greater than right as well as in the left groin region.   8.  11/1/2023-1/22/2024: Completion of BEP - bleomycin, etoposide, cisplatin - x 4 cycles.  9. 2/5/2024: CT C/A/P showed mildly enlarged mesenteric and retroperitoneal lymph nodes with representative left periaortic lymph node measuring 1.1 cm, previously 1.2 cm; no new lymph nodes; new interstitial infiltrates throughout the right lung, involving the upper, middle, and lower lobes. Pulmonary nodules overall stable with a right  lower lobe pulmonary nodule near the diaphragm contains a punctate focus of calcification, and is unchanged in size at 12 mm; 3 mm left upper lobe pulmonary nodule is stable; no new pulmonary nodules; mildly prominent right hilar lymph nodes likely reactive.    INTERVAL HISTORY:  Delroy reports feeling very well with excellent energy level and no pain.       REVIEW OF SYSTEMS:   14 point ROS was reviewed and is negative other than as noted above in HPI.       HOME MEDICATIONS:  Current Outpatient Medications   Medication Sig Dispense Refill     busPIRone (BUSPAR) 10 MG tablet Take 10 mg by mouth 2 times daily       naltrexone (DEPADE/REVIA) 50 MG tablet Take 1 tablet (50 mg) by mouth daily 90 tablet 2     sertraline (ZOLOFT) 100 MG tablet Take 1.5 tablets (150 mg) by mouth daily (Patient taking differently: Take 100 mg by mouth daily) 45 tablet 2         ALLERGIES:  Allergies   Allergen Reactions     Sulfamethoxazole-Trimethoprim Rash         PAST MEDICAL HISTORY:  Embryonal carcinoma.      PAST SURGICAL HISTORY:  [place] tooth removal.    SOCIAL HISTORY:  Social History     Socioeconomic History     Marital status: Single     Spouse name: Not on file     Number of children: Not on file     Years of education: Not on file     Highest education level: Not on file   Occupational History     Not on file   Tobacco Use     Smoking status: Former     Types: Cigarettes     Passive exposure: Never     Smokeless tobacco: Never   Substance and Sexual Activity     Alcohol use: Not on file     Drug use: Not on file     Sexual activity: Not on file   Other Topics Concern     Not on file   Social History Narrative     Not on file     Social Determinants of Health     Financial Resource Strain: Medium Risk (2/26/2024)    [facility]    Overall Financial Resource Strain (CARDIA)      Difficulty of Paying Living Expenses: Somewhat hard   Food Insecurity: No Food  Insecurity (2/26/2024)    Received from [facility], [facility]    Hunger Vital Sign      Worried About Running Out of Food in the Last Year: Never true      Ran Out of Food in the Last Year: Never true   Transportation Needs: No Transportation Needs (2/26/2024)    Received from [facility], [facility]    Transportation Needs      In the past 12 months, has lack of transportation kept you from medical appointments, meetings, work, or from getting medicines or things needed for daily living?: No   Physical Activity: Sufficiently Active (2/26/2024)    Received from [facility], [facility]    Exercise Vital Sign      Days of Exercise per Week: 5 days      Minutes of Exercise per Session: 30 min   Stress: Stress Concern Present (2/26/2024)    Received from [facility], [facility]    [place] [employer] - Occupational Stress Questionnaire      Feeling of Stress : To some extent   Social Connections: Socially Integrated (2/26/2024)    Received from [facility], [facility]    Social Connection and Isolation Panel [NHANES]      Frequency of Communication with Friends and Family: More than three times a week      Frequency of Social Gatherings with Friends and Family: Three times a week      Attends Buddhism Services: More than 4 times per year      Active Member of Clubs or Organizations: No      Attends Club or Organization Meetings: More than 4 times per year      Marital Status: Living with partner   Interpersonal Safety: Not At Risk (4/10/2024)    Received from [facility]    Intimate Partner Violence      Are you in a relationship where you are physically hurt, threatened and/or made to feel afraid?: No   Housing Stability: Unknown (2/26/2024)    Received from  "[facility] and [facility]    Housing Stability Vital Sign      Unable to Pay for Housing in the Last Year: No      Number of Times Moved in the Last Year: Not on file      Homeless in the Last Year: Not on file         FAMILY HISTORY:  BRCA-2 mutation on paternal side of family. Paternal great aunt with unknown type of cancer. Maternal grandparent with pancreatic cancer.    PHYSICAL EXAM:  Vital signs:  /72   Ht 1.753 m (5' 9\")   Wt 87.5 kg (193 lb)   BMI 28.50 kg/m     GENERAL: No acute distress.  EYES: No scleral icterus. No overt erythema.  RESPIRATORY: No audible cough, wheezing, or labored breathing.  MUSCULOSKELETAL: Range of motion in the neck, shoulders, and arms appear normal.  SKIN: No overt rashes, discolorations, or lesions over the face and neck.  NEUROLOGIC: Alert.  No overt tremors.  PSYCHIATRIC: Normal affect and mood.  Does not appear anxious.       LABS:  9/30/2024 [facility] labs:  WBC 8.1  Hgb 14.4  Platelets 260,000  Creatinine 0.76  Alk phos 51  Total bilirubin 0.3  AST 22  ALT 20  AFP = 1.7  Beta-HCG < 0.6  LDH = 167    ASSESSMENT/PLAN:  Jayson Banegas is a 29 year old male with the following issues:  1. Embryonal carcinoma, clinical stage IIIB, [id number], S2 (pre-orchiectomy), intermediate risk  2. 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 Diffuse abdominal, pelvic, and retroperitoneal lymphadenopathy  --10/11/2023 CT-guided biopsy of retroperitoneal lymph node confirmed an embryonal carcinoma, which is nonseminoma type.  --Stage III disease, overall prognosis with 5-year survival rate of 73%, all treatment with curative intent.  --He saw urology with Dr. Gilmore with no indication to do orchiectomy prior to chemo as no lesions were seen on prior U/S of scrotum.    --Delroy completed sperm banking prior to chemo.  --Delroy completed 4 cycles of BEP on 1/22/2024.  -- 2/5/2024 CT C/A/P showed mildly enlarged mesenteric and retroperitoneal lymph nodes with representative left " periaortic lymph node measuring 1.1 cm, previously 1.2 cm; no new lymph nodes; new interstitial infiltrates throughout the right lung, involving the upper, middle, and lower lobes. Pulmonary nodules overall stable with a right lower lobe pulmonary nodule near the diaphragm contains a punctate focus of calcification, and is unchanged in size at 12 mm; 3 mm left upper lobe pulmonary nodule is stable; no new pulmonary nodules; mildly prominent right hilar lymph nodes likely reactive.  --No clinical evidence of persistent disease with any lymph nodes > 2 cm -- therefore, RPLND/surgical resection not indicated.  --I reviewed Delroy's labs from which showed normal CBC and CMP; LDH, AFP, and beta-HCG all normal.  --Continue surveillance post-chemo every 2 months year 1, every 4 months year 2, every 6 months years 3-5, annually after year 5 and check serum HCG and AFP, chest x-ray. Reiterated this schedule.  --Repeat CBC, CMP and tumor markers prior to each visit.    2. Alcoholism, in remission  --On naltrexone and sober since 4/11/2024.  --He is receiving psychotherapy.    Return every 2 months for next 2 visits then every 4 months for year 2. He would prefer virtual visit due to the long distance from home.    Heather Wick MD  [facility] Hematology/Oncology     Total time spent today: 30 minutes in chart review, patient evaluation, counseling, documentation, test and/or medication/prescription orders, and extensive coordination of care.      The longitudinal plan of care for the diagnosis(es)/condition(s) as documented were addressed during this visit. Due to the added complexity in care, I will continue to support Delroy in the subsequent management and with ongoing continuity of care.      Again, thank you for allowing me to participate in the care of your patient.        Sincerely,        Heather Wick MD

## 2024-10-14 ENCOUNTER — PATIENT OUTREACH (OUTPATIENT)
Dept: ONCOLOGY | Facility: CLINIC | Age: 29
End: 2024-10-14
Payer: COMMERCIAL

## 2024-10-14 NOTE — TELEPHONE ENCOUNTER
Orders for Jan 2025 lab draw and imaging faxed to [facility]. Receipt confirmation via Right fax.    Tierney Santo RN

## 2024-11-27 ENCOUNTER — TELEPHONE (OUTPATIENT)
Dept: FAMILY MEDICINE | Facility: CLINIC | Age: 29
End: 2024-11-27
Payer: COMMERCIAL

## 2024-11-27 NOTE — TELEPHONE ENCOUNTER
Pt is looking for recertification for medical marijuana program by Dr. Wick. Please call back patient for any more information needed or if pt needs VV to discuss. Please review and advise if appropriate.     Corrina العراقي RN

## 2024-12-30 ENCOUNTER — TELEPHONE (OUTPATIENT)
Dept: ONCOLOGY | Facility: CLINIC | Age: 29
End: 2024-12-30
Payer: COMMERCIAL

## 2024-12-30 NOTE — CONFIDENTIAL NOTE
S-(situation): c/o chest pain, shortness of breath, pelvic pain, lung pain, fatigue, and weakness over the past 2 weeks and getting worse    B-(background): pt being followed for embryonal carcinoma    A-(assessment): pt reports that he has been having all of these symptoms for the past 2 weeks and they have been getting progressively worse during this time. Pt states that it feels like someone is lightly stepping on his chest, and he is short of breath unless he clears his throat. Denies fever.    R-(recommendations): Advised that pt be seen in the ED ASAP for evaluation. Pt verbalized understanding and will follow recommendations.

## 2025-03-03 ENCOUNTER — TELEPHONE (OUTPATIENT)
Dept: ONCOLOGY | Facility: CLINIC | Age: 30
End: 2025-03-03

## 2025-03-03 NOTE — TELEPHONE ENCOUNTER
Patient needs to be rescheduled for their virtual visit due to Reason for Reschedule: No-Show    Scheduling team, please refer to service line late cancellation/no-show policies and reach out to patient at a later date for rescheduling.    Appointment mode: Video  Provider: Delano Romero

## 2025-03-18 NOTE — NURSING NOTE
Current patient location: [address]  [address]  [place] [address]    Is the patient currently in the state of [place]? YES    Visit mode:VIDEO    If the visit is dropped, the patient can be reconnected by: VIDEO VISIT: Text to cell phone:   Telephone Information:   Mobile [phone]       Will anyone else be joining the visit? NO  (If patient encounters technical issues they should call [phone]    How would you like to obtain your AVS? MyChart    Are changes needed to the allergy or medication list? Pt stated no changes to allergies and pt states he takes Sertraline 100mg daily (instructions say to take 1.5 tablets daily)     Are refills needed on medications prescribed by this physician? NO    Reason for visit: VITO Anne LPN       Received fax from [facility] re: low back pain.    Phone # [phone]    Fax placed in basket

## 2025-03-29 ENCOUNTER — HEALTH MAINTENANCE LETTER (OUTPATIENT)
Age: 30
End: 2025-03-29

## (undated) RX ORDER — FENTANYL CITRATE 50 UG/ML
INJECTION, SOLUTION INTRAMUSCULAR; INTRAVENOUS
Status: DISPENSED
Start: 2023-10-27

## (undated) RX ORDER — FENTANYL CITRATE 50 UG/ML
INJECTION, SOLUTION INTRAMUSCULAR; INTRAVENOUS
Status: DISPENSED
Start: 2024-02-12

## (undated) RX ORDER — HEPARIN SODIUM (PORCINE) LOCK FLUSH IV SOLN 100 UNIT/ML 100 UNIT/ML
SOLUTION INTRAVENOUS
Status: DISPENSED
Start: 2023-12-15

## (undated) RX ORDER — HEPARIN SODIUM (PORCINE) LOCK FLUSH IV SOLN 100 UNIT/ML 100 UNIT/ML
SOLUTION INTRAVENOUS
Status: DISPENSED
Start: 2024-02-05

## (undated) RX ORDER — LIDOCAINE HYDROCHLORIDE 10 MG/ML
INJECTION, SOLUTION INFILTRATION; PERINEURAL
Status: DISPENSED
Start: 2023-10-27

## (undated) RX ORDER — CEFAZOLIN SODIUM 2 G/100ML
INJECTION, SOLUTION INTRAVENOUS
Status: DISPENSED
Start: 2023-10-27

## (undated) RX ORDER — HEPARIN SODIUM (PORCINE) LOCK FLUSH IV SOLN 100 UNIT/ML 100 UNIT/ML
SOLUTION INTRAVENOUS
Status: DISPENSED
Start: 2023-10-27

## (undated) RX ORDER — LIDOCAINE HYDROCHLORIDE 10 MG/ML
INJECTION, SOLUTION INFILTRATION; PERINEURAL
Status: DISPENSED
Start: 2024-02-12